# Patient Record
Sex: FEMALE | Race: WHITE | NOT HISPANIC OR LATINO | Employment: PART TIME | URBAN - METROPOLITAN AREA
[De-identification: names, ages, dates, MRNs, and addresses within clinical notes are randomized per-mention and may not be internally consistent; named-entity substitution may affect disease eponyms.]

---

## 2017-08-07 ENCOUNTER — APPOINTMENT (OUTPATIENT)
Dept: LAB | Age: 62
End: 2017-08-07

## 2017-08-07 ENCOUNTER — TRANSCRIBE ORDERS (OUTPATIENT)
Dept: ADMINISTRATIVE | Age: 62
End: 2017-08-07

## 2017-08-07 DIAGNOSIS — Z00.8 HEALTH EXAMINATION IN POPULATION SURVEY: Primary | ICD-10-CM

## 2017-08-07 DIAGNOSIS — Z00.8 HEALTH EXAMINATION IN POPULATION SURVEY: ICD-10-CM

## 2017-08-07 LAB
CHOLEST SERPL-MCNC: 191 MG/DL (ref 50–200)
EST. AVERAGE GLUCOSE BLD GHB EST-MCNC: 126 MG/DL
HBA1C MFR BLD: 6 % (ref 4.2–6.3)
HDLC SERPL-MCNC: 46 MG/DL (ref 40–60)
LDLC SERPL CALC-MCNC: 126 MG/DL (ref 0–100)
TRIGL SERPL-MCNC: 96 MG/DL

## 2017-08-07 PROCEDURE — 80061 LIPID PANEL: CPT

## 2017-08-07 PROCEDURE — 36415 COLL VENOUS BLD VENIPUNCTURE: CPT

## 2017-08-07 PROCEDURE — 83036 HEMOGLOBIN GLYCOSYLATED A1C: CPT

## 2017-10-01 ENCOUNTER — HOSPITAL ENCOUNTER (EMERGENCY)
Facility: HOSPITAL | Age: 62
Discharge: HOME | End: 2017-10-01
Attending: EMERGENCY MEDICINE | Admitting: EMERGENCY MEDICINE
Payer: COMMERCIAL

## 2017-10-01 VITALS
RESPIRATION RATE: 18 BRPM | TEMPERATURE: 98.3 F | SYSTOLIC BLOOD PRESSURE: 152 MMHG | OXYGEN SATURATION: 97 % | WEIGHT: 160 LBS | BODY MASS INDEX: 31.25 KG/M2 | DIASTOLIC BLOOD PRESSURE: 87 MMHG | HEART RATE: 76 BPM

## 2017-10-01 DIAGNOSIS — S61.219A FINGER LACERATION, INITIAL ENCOUNTER: Primary | ICD-10-CM

## 2017-10-01 PROCEDURE — 90471 IMMUNIZATION ADMIN: CPT

## 2017-10-01 PROCEDURE — 90715 TDAP VACCINE 7 YRS/> IM: CPT | Performed by: PHYSICIAN ASSISTANT

## 2017-10-01 PROCEDURE — 99283 EMERGENCY DEPT VISIT LOW MDM: CPT

## 2017-10-01 RX ORDER — LIDOCAINE HYDROCHLORIDE 10 MG/ML
3 INJECTION, SOLUTION EPIDURAL; INFILTRATION; INTRACAUDAL; PERINEURAL ONCE
Status: COMPLETED | OUTPATIENT
Start: 2017-10-01 | End: 2017-10-01

## 2017-10-01 RX ADMIN — TETANUS TOXOID, REDUCED DIPHTHERIA TOXOID AND ACELLULAR PERTUSSIS VACCINE, ADSORBED 0.5 ML: 5; 2.5; 8; 8; 2.5 SUSPENSION INTRAMUSCULAR at 14:11

## 2017-10-01 RX ADMIN — LIDOCAINE HYDROCHLORIDE 3 ML: 10 INJECTION, SOLUTION EPIDURAL; INFILTRATION; INTRACAUDAL; PERINEURAL at 14:12

## 2017-10-01 NOTE — DISCHARGE INSTRUCTIONS
Finger Laceration   WHAT YOU NEED TO KNOW:   A finger laceration is a deep cut in your skin  It is often caused by a sharp object, such as a knife, or blunt force to your finger  Your blood vessels, bones, joints, tendons, or nerves may also be injured  DISCHARGE INSTRUCTIONS:   Return to the emergency department if:   · Your wound comes apart  · Blood soaks through your bandage  · You have severe pain in your finger or hand  · Your finger is pale and cold  · You have sudden trouble moving your finger  · Your swelling suddenly gets worse  · You have red streaks on your skin coming from your wound  Contact your healthcare provider or hand specialist if:   · You have new numbness or tingling  · Your finger feels warm, looks swollen or red, and is draining pus  · You have a fever  · You have questions or concerns about your condition or care  Medicines: You may  need any of the following:  · Antibiotics  help prevent a bacterial infection  · Acetaminophen  decreases pain and fever  It is available without a doctor's order  Ask how much to take and how often to take it  Follow directions  Read the labels of all other medicines you are using to see if they also contain acetaminophen, or ask your doctor or pharmacist  Acetaminophen can cause liver damage if not taken correctly  Do not use more than 4 grams (4,000 milligrams) total of acetaminophen in one day  · Prescription pain medicine  may be given  Ask your healthcare provider how to take this medicine safely  Some prescription pain medicines contain acetaminophen  Do not take other medicines that contain acetaminophen without talking to your healthcare provider  Too much acetaminophen may cause liver damage  Prescription pain medicine may cause constipation  Ask your healthcare provider how to prevent or treat constipation  · Take your medicine as directed    Contact your healthcare provider if you think your medicine is not helping or if you have side effects  Tell him or her if you are allergic to any medicine  Keep a list of the medicines, vitamins, and herbs you take  Include the amounts, and when and why you take them  Bring the list or the pill bottles to follow-up visits  Carry your medicine list with you in case of an emergency  Self-care:   · Apply ice  on your finger for 15 to 20 minutes every hour or as directed  Use an ice pack, or put crushed ice in a plastic bag  Cover it with a towel before you apply it to your skin  Ice helps prevent tissue damage and decreases swelling and pain  · Elevate  your hand above the level of your heart as often as you can  This will help decrease swelling and pain  Prop your hand on pillows or blankets to keep it elevated comfortably  · Wear your splint as directed  A splint will decrease movement and stress on your wound  The splint may help your wound heal faster  Ask your healthcare provider how to apply and remove a splint  · Apply ointments to decrease scarring  Do not apply ointments until your healthcare provider says it is okay  You may need to wait until your wound is healed  Ask which ointment to buy and how often to use it  Wound care:   · Do not get your wound wet until your healthcare provider says it is okay  Do not soak your hand in water  Do not go swimming until your healthcare provider says it is okay  When your healthcare provider says it is okay, carefully wash around the wound with soap and water  Let soap and water run over your wound  Gently pat the area dry or allow it to air dry  · Change your bandages when they get wet, dirty, or after washing  Apply new, clean bandages as directed  Do not apply elastic bandages or tape too tightly  Do not put powders or lotions on your wound  · Apply antibiotic ointment as directed  Your healthcare provider may give you antibiotic ointment to put over your wound if you have stitches   If you have Strips-Strips over your wound, let them dry up and fall off on their own  If they do not fall off within 14 days, gently remove them  If you have glue over your wound, do not remove or pick at it  If your glue comes off, do not replace it with glue that you have at home  · Check your wound every day for signs of infection  Signs of infection include swelling, redness, or pus  Follow up with your healthcare provider or hand specialist in 2 days:  Write down your questions so you remember to ask them during your visits  © 2017 [facility] Information is for End User's use only and may not be sold, redistributed or otherwise used for commercial purposes  All illustrations and images included in CareNotes® are the copyrighted property of [employer]  or [address]  The above information is an  only  It is not intended as medical advice for individual conditions or treatments  Talk to your doctor, nurse or pharmacist before following any medical regimen to see if it is safe and effective for you

## 2017-10-01 NOTE — ED PROVIDER NOTES
History  Chief Complaint   Patient presents with    Finger Injury     Patient reports cutting a "large head of cabbage and came down on L 2nd finger slicing it open"  61 y/o right hand dominant female in [place] came to ed with pressure applied to left ring finger tip c/o "i cut myself accidentally with a kitchen knife"  Pt deny other injuries  Cms intact  Nail spared  Td status unknown  Prior to Admission Medications   Prescriptions Last Dose Informant Patient Reported? Taking? PARoxetine (PAXIL) 10 mg tablet   Yes Yes   Sig: Take 10 mg by mouth every morning  losartan (COZAAR) 50 mg tablet   Yes Yes   Sig: Take 50 mg by mouth daily  metoprolol succinate (TOPROL-XL) 50 mg 24 hr tablet   Yes Yes   Sig: Take 50 mg by mouth daily  Pt hasnt taken it for awhile   pantoprazole (PROTONIX) 40 mg tablet   Yes Yes   Sig: Take 40 mg by mouth 2 (two) times a day  pravastatin (PRAVACHOL) 10 mg tablet   Yes Yes   Sig: Take 10 mg by mouth daily  Facility-Administered Medications: None       Past Medical History:   Diagnosis Date    Anxiety     GERD (gastroesophageal reflux disease)     Hypertension        Past Surgical History:   Procedure Laterality Date     SECTION      ESOPHAGOGASTRODUODENOSCOPY N/A 2016    Procedure: ESOPHAGOGASTRODUODENOSCOPY (EGD); Surgeon: Luciana Elmore MD;  Location: [facility]; Service:     KNEE ARTHROSCOPY         History reviewed  No pertinent family history  I have reviewed and agree with the history as documented  Social History   Substance Use Topics    Smoking status: Former Smoker    Smokeless tobacco: Never Used    Alcohol use No        Review of Systems   Constitutional: Negative  HENT: Negative  Musculoskeletal: Negative  Skin: Positive for wound         Physical Exam  ED Triage Vitals [10/01/17 1307]   Temperature Pulse Respirations Blood Pressure SpO2   98 3 °F (36 8 °C) 76 18 152/87 97 %      Temp Source Heart Rate Source Patient Position - Orthostatic VS BP Location FiO2 (%)   Oral Monitor Lying Right arm --      Pain Score       5           Physical Exam   Constitutional: No distress  Musculoskeletal:        Hands:  No tendon lac  Simple 2 cm flap with 1 side still intact at paronychium side  Skin: Skin is warm  No rash noted  She is not diaphoretic  No erythema  No pallor  ED Medications  Medications   lidocaine (PF) (XYLOCAINE-MPF) 1 % injection 3 mL (3 mL Infiltration Given 10/1/17 1412)   tetanus-diphtheria-acellular pertussis (BOOSTRIX) IM injection 0 5 mL (0 5 mL Intramuscular Given 10/1/17 1411)       Diagnostic Studies  Labs Reviewed - No data to display    No orders to display       Procedures  Lac Repair  Date/Time: 10/1/2017 2:11 PM  Performed by: Ian Persons  Authorized by: Rock Barrios     Patient location:  ED  Other Assisting Provider: No    Consent:     Consent obtained:  Verbal    Consent given by:  Patient    Risks discussed:  Infection    Alternatives discussed:  No treatment  Universal protocol:     Procedure explained and questions answered to patient or proxy's satisfaction: yes      Patient identity confirmed:  Arm band  Anesthesia (see MAR for exact dosages):      Anesthesia method:  Local infiltration    Local anesthetic:  Lidocaine 1% w/o epi  Laceration details:     Location:  Finger    Finger location:  L ring finger    Length (cm) of Repair:  2  Repair type:     Repair type:  Simple  Pre-procedure details:     Preparation:  Patient was prepped and draped in usual sterile fashion  Exploration:     Wound exploration: entire depth of wound probed and visualized      Contaminated: no    Treatment:     Area cleansed with:  Betadine    Amount of cleaning:  Standard    Irrigation solution:  Sterile water  Skin repair:     Repair method:  Sutures    Suture size:  5-0    Suture material:  Prolene    Number of sutures:  5    Describe any area left open:  Flap in distal phalanx lateral aspect, nail spared  Approximation:     Approximation:  Close    Approximation difficulty:  Simple    Vermilion border: well-aligned    Post-procedure details:     Dressing:  Antibiotic ointment    Patient tolerance of procedure: Tolerated well, no immediate complications          Phone Contacts  ED Phone Contact    ED Course  ED Course                                MDM  Number of Diagnoses or Management Options  Finger laceration, initial encounter:   Diagnosis management comments: Pt tolerated procedure well  Td updated  CritCare Time    Disposition  Final diagnoses:   Finger laceration, initial encounter     ED Disposition     ED Disposition Condition Comment    Discharge  Rama Stover discharge to home/self care  Condition at discharge: Stable        Follow-up Information     Follow up With Specialties Details Why Contact Tiffanie Wilkins, DO Internal Medicine, Family Medicine  for suture removal [address] [address]  [phone]          Patient's Medications   Discharge Prescriptions    No medications on file     No discharge procedures on file      ED Provider  Electronically Signed by       Nadia Mendiola PA-C  10/01/17 0956

## 2017-10-12 ENCOUNTER — GENERIC CONVERSION - ENCOUNTER (OUTPATIENT)
Dept: OTHER | Facility: OTHER | Age: 62
End: 2017-10-12

## 2017-10-12 DIAGNOSIS — Z12.31 ENCOUNTER FOR SCREENING MAMMOGRAM FOR MALIGNANT NEOPLASM OF BREAST: ICD-10-CM

## 2017-10-12 DIAGNOSIS — R53.83 OTHER FATIGUE: ICD-10-CM

## 2017-10-12 DIAGNOSIS — E66.3 OVERWEIGHT: ICD-10-CM

## 2017-10-12 DIAGNOSIS — R73.9 HYPERGLYCEMIA: ICD-10-CM

## 2017-10-13 ENCOUNTER — HOSPITAL ENCOUNTER (OUTPATIENT)
Dept: RADIOLOGY | Age: 62
Discharge: HOME | End: 2017-10-13
Payer: COMMERCIAL

## 2017-10-13 DIAGNOSIS — Z12.31 ENCOUNTER FOR SCREENING MAMMOGRAM FOR MALIGNANT NEOPLASM OF BREAST: ICD-10-CM

## 2017-10-13 PROCEDURE — G0202 SCR MAMMO BI INCL CAD: HCPCS

## 2017-11-14 ENCOUNTER — ALLSCRIPTS OFFICE VISIT (OUTPATIENT)
Dept: OTHER | Facility: OTHER | Age: 62
End: 2017-11-14

## 2017-11-14 ENCOUNTER — APPOINTMENT (OUTPATIENT)
Dept: RADIOLOGY | Facility: OTHER | Age: 62
End: 2017-11-14
Payer: COMMERCIAL

## 2017-11-14 DIAGNOSIS — M25.561 PAIN IN RIGHT KNEE: ICD-10-CM

## 2017-11-14 DIAGNOSIS — M25.562 PAIN IN LEFT KNEE: ICD-10-CM

## 2017-11-14 PROCEDURE — 73562 X-RAY EXAM OF KNEE 3: CPT

## 2017-11-14 PROCEDURE — 73564 X-RAY EXAM KNEE 4 OR MORE: CPT

## 2017-11-15 NOTE — PROGRESS NOTES
Assessment    1  Primary localized osteoarthritis of right knee (715 16) (M17 11)    Plan  Bilateral knee pain    · * XR KNEE 3 VW LEFT NON INJURY; Status:Active; Requested for:14Nov2017;    · * XR KNEE 4+ VW RIGHT INJURY; Status:Active; Requested for:14Nov2017;   Primary localized osteoarthritis of right knee    · Follow-up visit in 1 month Evaluation and Treatment  Follow-up  Status: Hold For -Scheduling  Requested for: 72MKO9471   · What is a corticosteroid?; Status:Complete;   Done: 24GKK6763 01:43PM    Discussion/Summary    The patient has medial compartment osteoarthritis of the right knee and currently is experiencing symptoms  For symptomatic control she was offered and provided intra-articular injection today as detailed above  We will re-evaluate in 4 weeks, if she fails to improve a trial of viscosupplementation would be indicated  She understands that further arthroscopic surgery of the knee is unlikely to provide her with significant benefit and instead surgical options include arthroplasty  History of Present Illness  HPI: the patient presents with a chief complaint of right knee pain, she states the pain is sharp it is localized to medial and lateral aspect of the knee as well as the patellofemoral joint, is worse with activity relieved by rest, it is intermittent timing, it is not associated numbness or tingling or fevers and chills  The patient had a partial medial meniscectomy myself 5 or 6 years ago with excellent result until just recently      Review of Systems   Constitutional: No fever, no chills, feels well, no tiredness, no recent weight gain or loss  Eyes: No complaints of eyesight problems, no red eyes  ENT: no loss of hearing, no nosebleeds, no sore throat  Cardiovascular: No complaints of chest pain, no palpitations, no leg claudication or lower extremity edema  Respiratory: no compliants of shortness of breath, no wheezing, no cough    Gastrointestinal: no complaints of abdominal pain, no constipation, no nausea or diarrhea, no vomiting, no bloody stools  Genitourinary: no complaints of dysuria, no incontinence  Musculoskeletal: as noted in HPI  Integumentary: no complaints of skin rash or lesion, no itching or dry skin, no skin wounds  Neurological: no complaints of headache, no confusion, no numbness or tingling, no dizziness  Endocrine: No complaints of muscle weakness, no feelings of weakness, no frequent urination, no excessive thirst   Psychiatric: No suicidal thoughts, no anxiety, no feelings of depression  ROS reviewed  Active Problems    1  Bilateral knee pain (719 46) (M25 561,M25 562)   2  Depression (311) (F32 9)   3  Dyslipidemia (272 4) (E78 5)   4  Encounter for screening mammogram for malignant neoplasm of breast (V76 12) (Z12 31)   5  Fatigue (780 79) (R53 83)   6  Hyperglycemia (790 29) (R73 9)   7  Hypertension (401 9) (I10)   8   Overweight (278 02) (E66 3)    Past Medical History     · History of Acute frontal sinusitis (461 1) (J01 10)   · History of Acute upper respiratory infection (465 9) (J06 9)   · History of Anxiety (300 00) (F41 9)   · History of Arthralgia of right knee (719 46) (M25 561)   · History of Back spasm (724 8) (M62 830)   · History of  Delivery (669 70)   · History of Chronic interstitial cystitis (595 1) (N30 10)   · History of Encounter for routine gynecological examination (V72 31) (Z01 419)   · History of Encounter for screening mammogram for malignant neoplasm of breast(V76 12) (Z12 31)   · History of Esophagitis, reflux (530 11) (K21 0)   · History of acute bronchitis (V12 69) (Z87 09)   · History of acute sinusitis (V12 69) (Z87 09)   · History of backache (V13 59) (Z87 39)   · Denied: History of depression   · History of hypokalemia (V12 29) (Z86 39)   · History of influenza (V12 09) (Z87 09)   · History of osteopenia (V13 59) (Z87 39)   · History of screening mammography (V15 89) (Z92 89)   · Denied: History of substance abuse   · History of uterine leiomyoma (V13 29) (Z86 018)   · History of viral gastroenteritis (V12 09) (Z86 19)   · History of Irritable bowel syndrome (564 1) (K58 9)   · History of Joint Pain In Both Knees (719 46)   · History of Leg swelling (729 81) (M79 89)   · Normal delivery (650) (O80,Z37  9)   · History of Osteoarthritis (715 90) (M19 90)   · History of Postmenopausal Atrophic Vaginitis (627 3)   · History of Puncture wound of right hand, initial encounter (882 0) (A12 132U)   · History of Stress Incontinence (788 39)   · History of Thoracic back pain (724 1) (M54 6)   · History of Urinary Tract Infection (V13 02)    The active problems and past medical history were reviewed and updated today  Surgical History   · History of Breast Surgery Puncture Aspiration Of Cyst Left Breast   · History of Cardiac Cath Procedure Summary   · History of Complete Colonoscopy   · History of Diagnostic Esophagogastroduodenoscopy   · History of Knee Arthroscopy (Therapeutic)   · History of Vaginal Hysterectomy    The surgical history was reviewed and updated today  Family History  Mother    · Family history of Cancer   · Denied: Family history of depression   · Denied: Family history of substance abuse   · Family history of Mother  At Age 58  Father    · Denied: Family history of depression   · Denied: Family history of substance abuse   · Family history of Father  At Age 37   · Family history of Heart Disease (V17 49)  Son    · Family history of Acute Myocardial Infarction (V17 3)  Spouse    · Family history of lung cancer (V16 1) (Z80 1)  Family History    · Family history of Family Health Status ___ Children Living   · Family history of Hypertension (V17 49)   · Family history of Peptic Ulcer    The family history was reviewed and updated today         Social History     · Being A Social Drinker   · rare   · Daily caffeine consumption, 1 serving a day   · Exercise habits   · Former smoker (V15 82) (Z87 891)   · quite 3-4 yrs ago, 1/2 ppd x 20-25 yrs   ·   The social history was reviewed and updated today  Current Meds   1  ALPRAZolam 0 25 MG Oral Tablet (Xanax); TAKE 1 TABLET EVERY 8 TO 12 HOURS AS NEEDED; Therapy: 41OVG6025 to (Evaluate:11Nov2017); Last Rx:12Oct2017 Ordered   2  Losartan Potassium 50 MG Oral Tablet; TAKE 1 TABLET DAILY AS DIRECTED; Therapy: 78AVT9990 to (Tonny Mendiola)  Requested for: 62LMG9436; Last Rx:22Lbo9010 Ordered   3  Metoprolol Succinate ER 50 MG Oral Tablet Extended Release 24 Hour (Toprol XL); 1 tab PO daily; Therapy: 39TDV7443 to (Last Rx:12Oct2017)  Requested for: 12Oct2017 Ordered   4  PARoxetine HCl - 10 MG Oral Tablet (Paxil); one po bid; Therapy: 47UAR5258 to (Leonora Fitzgerald)  Requested for: 80YWG6863; Last Rx:12Oct2017 Ordered   5  Pravastatin Sodium 40 MG Oral Tablet (Pravachol); TAKE 1 TABLET DAILY; Therapy: 03AMR3206 to (Evaluate:93Gzc6395)  Requested for: 99EXY4816; Last Rx:12Oct2017 Ordered   6  Protonix 40 MG Oral Packet; TAKE 40 MG BID; Therapy: 83Ppo2395 to (Evaluate:35Dfw3813)  Requested for: 94JCO7497; Last Rx:12Oct2017 Ordered    The medication list was reviewed and updated today  Allergies  1  PredniSONE TABS   2  NSAIDs   3  Sulfa Drugs   4  Bactrim TABS    Vitals  Signs     Heart Rate: 73  Systolic: 526, Sitting  Diastolic: 83, Sitting  Weight: 160 lb   BMI Calculated: 31 25  BSA Calculated: 1 7    Physical Exam    Right Knee: Appearance: no deformity,-- no joint dislocation,-- no ecchymosis,-- no effusion-- and-- no erythema  Tenderness: diffuse anterolateral aspect,-- diffuse anteromedial aspect,-- lateral joint line-- and-- medial joint line, but-- not diffusely over the anterior knee  Palpatory findings include crepitus-- and-- no patella ballottement  ROM: Full   Motor: Normal  Special Test: Negative except positive Bounce Home test,-- positive medial Estrellita test-- and-- positive lateral Estrellita test, but-- negative Lachman's test,-- negative Posterior Drawer sign,-- no laxity on Valgus Stress-- and-- no laxity on Varus Stress  Constitutional - General appearance: Normal   Musculoskeletal - Gait and station: Normal   Cardiovascular - Pulses: Normal   Skin - Skin and subcutaneous tissue: Normal   Neurologic - Sensation: Normal   Psychiatric - Orientation to person, place, and time: Normal       Results/Data  I personally reviewed the films/images/results in the office today  My interpretation follows  X-ray Review weight-bearing views of both knees do show significant medial compartment narrowing on the right, this is significantly more advanced than it was in the past       Procedure  The patient was indicated for an intra-articular corticosteroid injection of the right knee as detailed in the office note  This was performed at the same time as the office visit for the patient's convenience  A thorough discussion regarding the risks and benefits of the injection as well as alternatives to the injection was performed with the patient  Specific risks discussed include but are not limited to infection, flare reaction, continued symptoms, need for further intervention, temporary elevation of blood glucose, as well as skin color changes at the site of injection  After this discussion the patient provided verbal consent to proceed forward with the injection  The right knee injection site was anesthetized with ethyl chloride spray, prepped with alcohol in routine fashion and using a superiolateral approach, a 22-gauge needle was used to inject a combination of 1 mL of 6mg/mL betamethasone and 2 ml of 0 25% bupivicane into the knee joint  The patient tolerated the procedure well without complication and a Band-Aid was placed   Avoidance of strenuous activity for 24-48 hours was recommended and a detailed handout regarding the medication utilized and the possible side effects as well as activity restriction was provided to the patient        Future Appointments    Date/Time Provider Specialty Site   04/16/2018 08:30 AM Lary Gutierrez DO Internal Medicine [facility]       Signatures   Electronically signed by : CESAR Min ; Nov 14 2017  1:45PM EST                       (Author)

## 2018-01-10 NOTE — MISCELLANEOUS
Message  Return to work or school:   Katya Balderas is under my professional care  She was seen in my office on 03/18/2016   She is able to return to work on  03/21/2016      Please excuse from work 3/18/2016  KI [place]        Signatures   Electronically signed by : Elizabeth Durbin DO; Mar 19 2016  8:23AM EST                       (Author)

## 2018-01-10 NOTE — MISCELLANEOUS
Message  Return to work or school:   Vaishnavi Astorga is under my professional care  She was seen in my office on 03/24/2016       Please excuse [place] from work today  KI Nicole        Signatures   Electronically signed by : Debbie Medrano DO; Mar 24 2016 11:38AM EST                       (Author)

## 2018-01-14 VITALS
HEART RATE: 73 BPM | DIASTOLIC BLOOD PRESSURE: 83 MMHG | SYSTOLIC BLOOD PRESSURE: 147 MMHG | BODY MASS INDEX: 31.25 KG/M2 | WEIGHT: 160 LBS

## 2018-01-15 NOTE — MISCELLANEOUS
Message  Return to work or school:   Katherin Yusuf is under my professional care  She was seen in my office on 01/13/2016   She is able to return to work on  01/15/2016      Ivette Zavala          Signatures   Electronically signed by : Sheryl Monroe DO; Jan 14 2016  8:04AM EST                       (Author)

## 2018-01-16 ENCOUNTER — GENERIC CONVERSION - ENCOUNTER (OUTPATIENT)
Dept: OTHER | Facility: OTHER | Age: 63
End: 2018-01-16

## 2018-01-22 VITALS
HEART RATE: 88 BPM | SYSTOLIC BLOOD PRESSURE: 132 MMHG | WEIGHT: 160.25 LBS | DIASTOLIC BLOOD PRESSURE: 76 MMHG | OXYGEN SATURATION: 97 % | HEIGHT: 60 IN | BODY MASS INDEX: 31.46 KG/M2 | TEMPERATURE: 97.1 F

## 2018-01-24 VITALS
HEART RATE: 84 BPM | BODY MASS INDEX: 30.74 KG/M2 | WEIGHT: 157.38 LBS | SYSTOLIC BLOOD PRESSURE: 133 MMHG | DIASTOLIC BLOOD PRESSURE: 83 MMHG

## 2018-02-02 ENCOUNTER — OFFICE VISIT (OUTPATIENT)
Dept: OBGYN CLINIC | Facility: MEDICAL CENTER | Age: 63
End: 2018-02-02
Payer: COMMERCIAL

## 2018-02-02 VITALS
BODY MASS INDEX: 30.43 KG/M2 | DIASTOLIC BLOOD PRESSURE: 57 MMHG | WEIGHT: 155 LBS | HEIGHT: 60 IN | HEART RATE: 58 BPM | SYSTOLIC BLOOD PRESSURE: 129 MMHG

## 2018-02-02 DIAGNOSIS — M17.11 PRIMARY OSTEOARTHRITIS OF RIGHT KNEE: Primary | ICD-10-CM

## 2018-02-02 DIAGNOSIS — M17.11 PRIMARY LOCALIZED OSTEOARTHRITIS OF RIGHT KNEE: Primary | ICD-10-CM

## 2018-02-02 PROCEDURE — 99213 OFFICE O/P EST LOW 20 MIN: CPT | Performed by: ORTHOPAEDIC SURGERY

## 2018-02-02 RX ORDER — ALPRAZOLAM 0.25 MG/1
1 TABLET ORAL EVERY 12 HOURS PRN
COMMUNITY
Start: 2015-03-23 | End: 2018-03-09

## 2018-02-02 RX ORDER — DOCUSATE SODIUM 100 MG/1
100 CAPSULE, LIQUID FILLED ORAL 2 TIMES DAILY
Status: CANCELLED | OUTPATIENT
Start: 2018-02-02

## 2018-02-02 RX ORDER — ACETAMINOPHEN 325 MG/1
975 TABLET ORAL ONCE
Status: CANCELLED | OUTPATIENT
Start: 2018-02-02 | End: 2018-02-02

## 2018-02-02 RX ORDER — CHLORHEXIDINE GLUCONATE 4 G/100ML
SOLUTION TOPICAL DAILY PRN
Status: CANCELLED | OUTPATIENT
Start: 2018-02-02

## 2018-02-02 RX ORDER — SODIUM CHLORIDE, SODIUM LACTATE, POTASSIUM CHLORIDE, CALCIUM CHLORIDE 600; 310; 30; 20 MG/100ML; MG/100ML; MG/100ML; MG/100ML
125 INJECTION, SOLUTION INTRAVENOUS CONTINUOUS
Status: CANCELLED | OUTPATIENT
Start: 2018-02-02

## 2018-02-02 RX ORDER — ONDANSETRON 2 MG/ML
4 INJECTION INTRAMUSCULAR; INTRAVENOUS ONCE
Status: CANCELLED | OUTPATIENT
Start: 2018-02-02 | End: 2018-02-02

## 2018-02-02 NOTE — PROGRESS NOTES
60 y o female with right knee arthritis presenting for evaluation  She has previously been seeing Dr Randi Anne for right knee pain and has past surgical history of an arthroscopic partial meniscectomy in   Since she has had progressive insidious atraumatic onset of right knee pain  She has had a previous steroid injection this past November which gave her minimal symptom relief  Pain is made worse with the knee in a bent position and is most bothersome when she is driving or sitting for prolonged period times  It is also bothersome when she walks for prolonged periods  She says her knee is of the septum 1 is more painful in the evening  She has also tried bracing and home exercises which the provide relief  Denies any new injury, numbness tingling, fevers or chills    Review of Systems  Review of systems negative unless otherwise specified in HPI    Past Medical History  Past Medical History:   Diagnosis Date    Anxiety     GERD (gastroesophageal reflux disease)     Hypertension     Stomach disorder        Past Surgical History  Past Surgical History:   Procedure Laterality Date    CARDIAC SURGERY       SECTION      ESOPHAGOGASTRODUODENOSCOPY N/A 2016    Procedure: ESOPHAGOGASTRODUODENOSCOPY (EGD); Surgeon: Roberto Kendall MD;  Location: [facility]; Service:     KNEE ARTHROSCOPY         Current Medications  Current Outpatient Prescriptions on File Prior to Visit   Medication Sig Dispense Refill    losartan (COZAAR) 50 mg tablet Take 50 mg by mouth daily   metoprolol succinate (TOPROL-XL) 50 mg 24 hr tablet Take 50 mg by mouth daily  Pt hasnt taken it for awhile      pantoprazole (PROTONIX) 40 mg tablet Take 40 mg by mouth 2 (two) times a day   PARoxetine (PAXIL) 10 mg tablet Take 10 mg by mouth every morning   pravastatin (PRAVACHOL) 10 mg tablet Take 10 mg by mouth daily  No current facility-administered medications on file prior to visit          Recent Labs (HCT,HGB,PT,INR,ESR,CRP,GLU,HgA1C)  @LABALLVALUEIP(HCT,HGB,WBC,PT,INR,ESR,CRP,GLUCOSE,HGBA1C)@      Physical exam  · General: Awake, Alert, Oriented  · Eyes: Pupils equal, round and reactive to light  · Heart: regular rate and rhythm  · Lungs: No audible wheezing, CTAB  · Abdomen: soft  Right lower extremity  · Varus alignment  · Skin intact  · Extension 5°, flexion full  · Tender palpation over medial joint line  · No knee effusion  · Knee stable in sagittal and coronal planes  · 5 out of 5 quad and hamstring strength  · Sensation intact L1-S1  Imaging  X-rays of bilateral knees shows joint space narrowing, subchondral sclerosis and osteophytic changes in the right knee more so in the medial compartment      Assessment/Plan:   60 y  o female with right knee arthritis has been refractory to conservative treatments including injections, bracing, and exercises    · Treatment options were discussed with the patient including both continued conservative care and surgical management  A joint decision was made to proceed with a right total knee arthroplasty  Informed consent was obtained she is booked for surgery accordingly  Risks and benefits were explained including bleeding, infection, persistent knee pain, blood clot, pulmonary embolism    · Preoperative prescriptions were given in the office today  · We will see her back in the office postoperatively

## 2018-02-05 ENCOUNTER — TELEPHONE (OUTPATIENT)
Dept: OBGYN CLINIC | Facility: HOSPITAL | Age: 63
End: 2018-02-05

## 2018-02-05 DIAGNOSIS — M25.561 ACUTE PAIN OF RIGHT KNEE: Primary | ICD-10-CM

## 2018-02-05 RX ORDER — FERROUS SULFATE TAB EC 324 MG (65 MG FE EQUIVALENT) 324 (65 FE) MG
324 TABLET DELAYED RESPONSE ORAL
Qty: 60 TABLET | Refills: 0 | Status: SHIPPED | OUTPATIENT
Start: 2018-02-05 | End: 2018-03-09

## 2018-02-05 RX ORDER — ASCORBIC ACID 500 MG
500 TABLET ORAL 2 TIMES DAILY
Qty: 60 TABLET | Refills: 0 | Status: SHIPPED | OUTPATIENT
Start: 2018-02-05 | End: 2018-03-09

## 2018-02-05 RX ORDER — FOLIC ACID 1 MG/1
1 TABLET ORAL DAILY
Qty: 30 TABLET | Refills: 0 | Status: SHIPPED | OUTPATIENT
Start: 2018-02-05 | End: 2018-03-09

## 2018-02-05 NOTE — TELEPHONE ENCOUNTER
Patient is calling   [phone]  Patient sees Dr Lyly Hoffman    Patient would like the scripts that Dr Lyly Hoffman gave her sent to [facility] at [address], [address] [place] [address]  The scripts that she would like sent there are:  *Folic Acid  *Vitamin C  *Ferrous Sulfate    Please call the patient when the scripts are sent over

## 2018-02-12 ENCOUNTER — APPOINTMENT (OUTPATIENT)
Dept: RADIOLOGY | Age: 63
End: 2018-02-12
Payer: COMMERCIAL

## 2018-02-12 ENCOUNTER — TRANSCRIBE ORDERS (OUTPATIENT)
Dept: ADMINISTRATIVE | Age: 63
End: 2018-02-12

## 2018-02-12 ENCOUNTER — APPOINTMENT (OUTPATIENT)
Dept: LAB | Age: 63
End: 2018-02-12
Payer: COMMERCIAL

## 2018-02-12 ENCOUNTER — OFFICE VISIT (OUTPATIENT)
Dept: LAB | Age: 63
End: 2018-02-12
Payer: COMMERCIAL

## 2018-02-12 DIAGNOSIS — M17.11 PRIMARY LOCALIZED OSTEOARTHRITIS OF RIGHT KNEE: ICD-10-CM

## 2018-02-12 DIAGNOSIS — M17.11 PRIMARY LOCALIZED OSTEOARTHRITIS OF RIGHT KNEE: Primary | ICD-10-CM

## 2018-02-12 LAB
ABO GROUP BLD: NORMAL
ANION GAP SERPL CALCULATED.3IONS-SCNC: 5 MMOL/L (ref 4–13)
APTT PPP: 31 SECONDS (ref 23–35)
ATRIAL RATE: 52 BPM
BASOPHILS # BLD AUTO: 0.04 THOUSANDS/ΜL (ref 0–0.1)
BASOPHILS NFR BLD AUTO: 1 % (ref 0–1)
BLD GP AB SCN SERPL QL: NEGATIVE
BUN SERPL-MCNC: 12 MG/DL (ref 5–25)
CALCIUM SERPL-MCNC: 9 MG/DL (ref 8.3–10.1)
CHLORIDE SERPL-SCNC: 105 MMOL/L (ref 100–108)
CO2 SERPL-SCNC: 31 MMOL/L (ref 21–32)
CREAT SERPL-MCNC: 0.74 MG/DL (ref 0.6–1.3)
EOSINOPHIL # BLD AUTO: 0.18 THOUSAND/ΜL (ref 0–0.61)
EOSINOPHIL NFR BLD AUTO: 2 % (ref 0–6)
ERYTHROCYTE [DISTWIDTH] IN BLOOD BY AUTOMATED COUNT: 13.9 % (ref 11.6–15.1)
GFR SERPL CREATININE-BSD FRML MDRD: 86 ML/MIN/1.73SQ M
GLUCOSE P FAST SERPL-MCNC: 94 MG/DL (ref 65–99)
HCT VFR BLD AUTO: 44.7 % (ref 34.8–46.1)
HGB BLD-MCNC: 14.3 G/DL (ref 11.5–15.4)
INR PPP: 0.94 (ref 0.86–1.16)
LYMPHOCYTES # BLD AUTO: 2.4 THOUSANDS/ΜL (ref 0.6–4.47)
LYMPHOCYTES NFR BLD AUTO: 28 % (ref 14–44)
MCH RBC QN AUTO: 28.3 PG (ref 26.8–34.3)
MCHC RBC AUTO-ENTMCNC: 32 G/DL (ref 31.4–37.4)
MCV RBC AUTO: 89 FL (ref 82–98)
MONOCYTES # BLD AUTO: 0.55 THOUSAND/ΜL (ref 0.17–1.22)
MONOCYTES NFR BLD AUTO: 7 % (ref 4–12)
NEUTROPHILS # BLD AUTO: 5.31 THOUSANDS/ΜL (ref 1.85–7.62)
NEUTS SEG NFR BLD AUTO: 62 % (ref 43–75)
NRBC BLD AUTO-RTO: 0 /100 WBCS
P AXIS: 72 DEGREES
PLATELET # BLD AUTO: 271 THOUSANDS/UL (ref 149–390)
PMV BLD AUTO: 10.7 FL (ref 8.9–12.7)
POTASSIUM SERPL-SCNC: 4.1 MMOL/L (ref 3.5–5.3)
PR INTERVAL: 152 MS
PROTHROMBIN TIME: 12.6 SECONDS (ref 12.1–14.4)
QRS AXIS: 9 DEGREES
QRSD INTERVAL: 78 MS
QT INTERVAL: 442 MS
QTC INTERVAL: 411 MS
RBC # BLD AUTO: 5.05 MILLION/UL (ref 3.81–5.12)
RH BLD: POSITIVE
SODIUM SERPL-SCNC: 141 MMOL/L (ref 136–145)
SPECIMEN EXPIRATION DATE: NORMAL
T WAVE AXIS: 34 DEGREES
VENTRICULAR RATE: 52 BPM
WBC # BLD AUTO: 8.49 THOUSAND/UL (ref 4.31–10.16)

## 2018-02-12 PROCEDURE — 86901 BLOOD TYPING SEROLOGIC RH(D): CPT

## 2018-02-12 PROCEDURE — 85730 THROMBOPLASTIN TIME PARTIAL: CPT

## 2018-02-12 PROCEDURE — 80048 BASIC METABOLIC PNL TOTAL CA: CPT

## 2018-02-12 PROCEDURE — 86850 RBC ANTIBODY SCREEN: CPT

## 2018-02-12 PROCEDURE — 71046 X-RAY EXAM CHEST 2 VIEWS: CPT

## 2018-02-12 PROCEDURE — 85610 PROTHROMBIN TIME: CPT

## 2018-02-12 PROCEDURE — 85025 COMPLETE CBC W/AUTO DIFF WBC: CPT

## 2018-02-12 PROCEDURE — 36415 COLL VENOUS BLD VENIPUNCTURE: CPT

## 2018-02-12 PROCEDURE — 93005 ELECTROCARDIOGRAM TRACING: CPT

## 2018-02-12 PROCEDURE — 93010 ELECTROCARDIOGRAM REPORT: CPT | Performed by: INTERNAL MEDICINE

## 2018-02-12 PROCEDURE — 86900 BLOOD TYPING SEROLOGIC ABO: CPT

## 2018-02-15 ENCOUNTER — OFFICE VISIT (OUTPATIENT)
Dept: FAMILY MEDICINE CLINIC | Facility: CLINIC | Age: 63
End: 2018-02-15
Payer: COMMERCIAL

## 2018-02-15 VITALS
SYSTOLIC BLOOD PRESSURE: 122 MMHG | TEMPERATURE: 97.5 F | BODY MASS INDEX: 31.02 KG/M2 | WEIGHT: 158 LBS | HEIGHT: 60 IN | OXYGEN SATURATION: 98 % | RESPIRATION RATE: 16 BRPM | HEART RATE: 64 BPM | DIASTOLIC BLOOD PRESSURE: 76 MMHG

## 2018-02-15 DIAGNOSIS — J40 BRONCHITIS: Primary | ICD-10-CM

## 2018-02-15 DIAGNOSIS — Z01.818 PRE-OPERATIVE CLEARANCE: Primary | ICD-10-CM

## 2018-02-15 DIAGNOSIS — K21.9 CHRONIC GERD: ICD-10-CM

## 2018-02-15 DIAGNOSIS — I10 HYPERTENSION, UNSPECIFIED TYPE: ICD-10-CM

## 2018-02-15 DIAGNOSIS — M17.11 PRIMARY LOCALIZED OSTEOARTHRITIS OF RIGHT KNEE: ICD-10-CM

## 2018-02-15 DIAGNOSIS — J32.9 SINUSITIS, UNSPECIFIED CHRONICITY, UNSPECIFIED LOCATION: ICD-10-CM

## 2018-02-15 PROBLEM — M25.561 BILATERAL KNEE PAIN: Status: ACTIVE | Noted: 2017-11-14

## 2018-02-15 PROBLEM — M25.562 BILATERAL KNEE PAIN: Status: ACTIVE | Noted: 2017-11-14

## 2018-02-15 PROCEDURE — 99242 OFF/OP CONSLTJ NEW/EST SF 20: CPT | Performed by: INTERNAL MEDICINE

## 2018-02-15 PROCEDURE — 99213 OFFICE O/P EST LOW 20 MIN: CPT | Performed by: INTERNAL MEDICINE

## 2018-02-15 RX ORDER — ALBUTEROL SULFATE 90 UG/1
2 AEROSOL, METERED RESPIRATORY (INHALATION) EVERY 6 HOURS PRN
Qty: 1 INHALER | Refills: 0 | Status: SHIPPED | OUTPATIENT
Start: 2018-02-15 | End: 2018-03-09

## 2018-02-15 RX ORDER — LEVOFLOXACIN 500 MG/1
500 TABLET, FILM COATED ORAL EVERY 24 HOURS
Qty: 10 TABLET | Refills: 0 | Status: SHIPPED | OUTPATIENT
Start: 2018-02-15 | End: 2018-02-25

## 2018-02-15 NOTE — PATIENT INSTRUCTIONS
Do not take fish oil, vit e ginkoba, gingsing, aspirin or nsaids one week before or  The day of or take proventil, losartan, metoprolol and protonix with sip of water  Pt is cleared for or

## 2018-02-15 NOTE — PROGRESS NOTES
Assessment/Plan:         Diagnoses and all orders for this visit:    Bronchitis  -     levofloxacin (LEVAQUIN) 500 mg tablet; Take 1 tablet (500 mg total) by mouth every 24 hours for 10 days  -     albuterol (PROVENTIL HFA,VENTOLIN HFA) 90 mcg/act inhaler; Inhale 2 puffs every 6 (six) hours as needed for wheezing    Sinusitis, unspecified chronicity, unspecified location          Subjective:      Patient ID: Murtaza Aparicio is a 60 y o  female  Pt feels ill since Tuesday   +cough she states it is dry and productive +post nasal drip  On fa/luis armando/iron for her prep for surgery  +h/a, denies f/s +chills  Denies sore throat        The following portions of the patient's history were reviewed and updated as appropriate: She  has a past medical history of Anxiety; GERD (gastroesophageal reflux disease); Hypertension; and Stomach disorder  She  does not have any pertinent problems on file  She  has a past surgical history that includes  section; Esophagogastroduodenoscopy (N/A, 2016); Knee arthroscopy (); and Cardiac surgery  Her family history includes Cancer in her mother; Heart attack in her father  She  reports that she has quit smoking  She has never used smokeless tobacco  She reports that she does not drink alcohol or use drugs    Current Outpatient Prescriptions   Medication Sig Dispense Refill    ALPRAZolam (XANAX) 0 25 mg tablet Take 1 tablet by mouth every 12 (twelve) hours as needed      ascorbic acid (VITAMIN C) 500 MG tablet Take 1 tablet (500 mg total) by mouth 2 (two) times a day Take with Ferrous sulfate, as part of pre-op Blood management protocol 60 tablet 0    ferrous sulfate 324 (65 Fe) mg Take 1 tablet (324 mg total) by mouth 2 (two) times a day before meals Take 1 pill BID, PRE-OP with Vit C, may be constipatory 60 tablet 0    folic acid (FOLVITE) 1 mg tablet Take 1 tablet (1 mg total) by mouth daily for 30 doses Take 1 pill po Qday PRE-OP, with Vit C, and Iron 30 tablet 0    losartan (COZAAR) 50 mg tablet Take 50 mg by mouth daily   metoprolol succinate (TOPROL-XL) 50 mg 24 hr tablet Take 50 mg by mouth daily  Pt hasnt taken it for awhile      pantoprazole (PROTONIX) 40 mg tablet Take 40 mg by mouth 2 (two) times a day   PARoxetine (PAXIL) 10 mg tablet Take 10 mg by mouth every morning   pravastatin (PRAVACHOL) 10 mg tablet Take 10 mg by mouth daily   albuterol (PROVENTIL HFA,VENTOLIN HFA) 90 mcg/act inhaler Inhale 2 puffs every 6 (six) hours as needed for wheezing 1 Inhaler 0    levofloxacin (LEVAQUIN) 500 mg tablet Take 1 tablet (500 mg total) by mouth every 24 hours for 10 days 10 tablet 0     No current facility-administered medications for this visit  Current Outpatient Prescriptions on File Prior to Visit   Medication Sig    ALPRAZolam (XANAX) 0 25 mg tablet Take 1 tablet by mouth every 12 (twelve) hours as needed    ascorbic acid (VITAMIN C) 500 MG tablet Take 1 tablet (500 mg total) by mouth 2 (two) times a day Take with Ferrous sulfate, as part of pre-op Blood management protocol    ferrous sulfate 324 (65 Fe) mg Take 1 tablet (324 mg total) by mouth 2 (two) times a day before meals Take 1 pill BID, PRE-OP with Vit C, may be constipatory    folic acid (FOLVITE) 1 mg tablet Take 1 tablet (1 mg total) by mouth daily for 30 doses Take 1 pill po Qday PRE-OP, with Vit C, and Iron    losartan (COZAAR) 50 mg tablet Take 50 mg by mouth daily   metoprolol succinate (TOPROL-XL) 50 mg 24 hr tablet Take 50 mg by mouth daily  Pt hasnt taken it for awhile    pantoprazole (PROTONIX) 40 mg tablet Take 40 mg by mouth 2 (two) times a day   PARoxetine (PAXIL) 10 mg tablet Take 10 mg by mouth every morning   pravastatin (PRAVACHOL) 10 mg tablet Take 10 mg by mouth daily  No current facility-administered medications on file prior to visit        She is allergic to bactrim [sulfamethoxazole-trimethoprim]; prednisone; and sulfa antibiotics       Review of Systems   Constitutional: Negative for chills and fever  HENT: Positive for ear pain and postnasal drip  Negative for sinus pain, sinus pressure and sore throat  Respiratory: Positive for wheezing  Cardiovascular: Negative  Neurological: Positive for headaches  Objective:    Vitals:    02/15/18 0812   BP: 122/76   Pulse: 64   Resp: 16   Temp: 97 5 °F (36 4 °C)   SpO2: 98%        Physical Exam   Constitutional: She appears well-developed and well-nourished  HENT:   Head: Normocephalic and atraumatic  Right Ear: External ear normal    Left Ear: External ear normal    Mouth/Throat: Oropharynx is clear and moist    Neck: Normal range of motion  Neck supple  Cardiovascular: Normal rate, regular rhythm and normal heart sounds  Pulmonary/Chest: Effort normal and breath sounds normal  She has no wheezes

## 2018-02-15 NOTE — PROGRESS NOTES
Assessment/Plan:    Romayne Doyne was seen today for pre-op exam     Diagnoses and all orders for this visit:    Pre-operative clearance  Comments:  no contraindication to or  Hypertension, unspecified type  Comments:  stable    Chronic GERD  Comments:  stable    Primary localized osteoarthritis of right knee  Comments:  okay for knee surgery               Subjective:      Patient ID: Jerad Merritt is a 60 y o  female  Pt in for preop  Denies mi within 6 months  +htn-controlled  Denies dm, arrythmia,denines problem with gen anesthesia        The following portions of the patient's history were reviewed and updated as appropriate: She  has a past medical history of Anxiety; GERD (gastroesophageal reflux disease); History of palpitations; Hypertension; and Irritable bowel syndrome  She  does not have any pertinent problems on file  She  has a past surgical history that includes  section; Esophagogastroduodenoscopy (N/A, 2016); Knee arthroscopy (); and Cardiac catheterization  Her family history includes Cancer in her mother; Heart attack in her father  She  reports that she has quit smoking  She has never used smokeless tobacco  She reports that she does not drink alcohol or use drugs    Current Outpatient Prescriptions   Medication Sig Dispense Refill    albuterol (PROVENTIL HFA,VENTOLIN HFA) 90 mcg/act inhaler Inhale 2 puffs every 6 (six) hours as needed for wheezing 1 Inhaler 0    ALPRAZolam (XANAX) 0 25 mg tablet Take 1 tablet by mouth every 12 (twelve) hours as needed      ascorbic acid (VITAMIN C) 500 MG tablet Take 1 tablet (500 mg total) by mouth 2 (two) times a day Take with Ferrous sulfate, as part of pre-op Blood management protocol 60 tablet 0    ferrous sulfate 324 (65 Fe) mg Take 1 tablet (324 mg total) by mouth 2 (two) times a day before meals Take 1 pill BID, PRE-OP with Vit C, may be constipatory 60 tablet 0    folic acid (FOLVITE) 1 mg tablet Take 1 tablet (1 mg total) by mouth daily for 30 doses Take 1 pill po Qday PRE-OP, with Vit C, and Iron 30 tablet 0    Ibuprofen (ADVIL PO) Take by mouth as needed      levofloxacin (LEVAQUIN) 500 mg tablet Take 1 tablet (500 mg total) by mouth every 24 hours for 10 days 10 tablet 0    losartan (COZAAR) 50 mg tablet Take 50 mg by mouth every evening        metoprolol succinate (TOPROL-XL) 50 mg 24 hr tablet Take 50 mg by mouth every evening Pt hasnt taken it for awhile        pantoprazole (PROTONIX) 40 mg tablet Take 40 mg by mouth 2 (two) times a day   PARoxetine (PAXIL) 10 mg tablet Take 10 mg by mouth every evening        pravastatin (PRAVACHOL) 10 mg tablet Take 10 mg by mouth every evening         No current facility-administered medications for this visit        Current Outpatient Prescriptions on File Prior to Visit   Medication Sig    albuterol (PROVENTIL HFA,VENTOLIN HFA) 90 mcg/act inhaler Inhale 2 puffs every 6 (six) hours as needed for wheezing    ALPRAZolam (XANAX) 0 25 mg tablet Take 1 tablet by mouth every 12 (twelve) hours as needed    ascorbic acid (VITAMIN C) 500 MG tablet Take 1 tablet (500 mg total) by mouth 2 (two) times a day Take with Ferrous sulfate, as part of pre-op Blood management protocol    ferrous sulfate 324 (65 Fe) mg Take 1 tablet (324 mg total) by mouth 2 (two) times a day before meals Take 1 pill BID, PRE-OP with Vit C, may be constipatory    folic acid (FOLVITE) 1 mg tablet Take 1 tablet (1 mg total) by mouth daily for 30 doses Take 1 pill po Qday PRE-OP, with Vit C, and Iron    Ibuprofen (ADVIL PO) Take by mouth as needed    levofloxacin (LEVAQUIN) 500 mg tablet Take 1 tablet (500 mg total) by mouth every 24 hours for 10 days    losartan (COZAAR) 50 mg tablet Take 50 mg by mouth every evening      metoprolol succinate (TOPROL-XL) 50 mg 24 hr tablet Take 50 mg by mouth every evening Pt hasnt taken it for awhile      pantoprazole (PROTONIX) 40 mg tablet Take 40 mg by mouth 2 (two) times a day   PARoxetine (PAXIL) 10 mg tablet Take 10 mg by mouth every evening      pravastatin (PRAVACHOL) 10 mg tablet Take 10 mg by mouth every evening       No current facility-administered medications on file prior to visit  She is allergic to bactrim [sulfamethoxazole-trimethoprim]; prednisone; and sulfa antibiotics       Review of Systems   Constitutional: Negative for chills and fever  HENT: Positive for postnasal drip, sinus pain and sinus pressure  Negative for sore throat  Respiratory: Positive for cough and wheezing  Cardiovascular: Negative  Neurological: Positive for headaches  Objective: There were no vitals filed for this visit  Physical Exam   Constitutional: She appears well-developed and well-nourished  HENT:   Head: Normocephalic and atraumatic  Right Ear: External ear normal    Left Ear: External ear normal    Eyes: EOM are normal  Pupils are equal, round, and reactive to light  Neck: Normal range of motion  Neck supple  Cardiovascular: Normal rate and regular rhythm  Pulmonary/Chest: Effort normal and breath sounds normal    Abdominal: Soft   Bowel sounds are normal

## 2018-02-19 ENCOUNTER — APPOINTMENT (OUTPATIENT)
Dept: PREADMISSION TESTING | Facility: HOSPITAL | Age: 63
End: 2018-02-19
Payer: COMMERCIAL

## 2018-02-19 ENCOUNTER — LAB (OUTPATIENT)
Dept: LAB | Facility: HOSPITAL | Age: 63
End: 2018-02-19
Attending: ORTHOPAEDIC SURGERY
Payer: COMMERCIAL

## 2018-02-19 ENCOUNTER — TELEPHONE (OUTPATIENT)
Dept: OBGYN CLINIC | Facility: HOSPITAL | Age: 63
End: 2018-02-19

## 2018-02-19 DIAGNOSIS — M17.11 PRIMARY OSTEOARTHRITIS OF RIGHT KNEE: ICD-10-CM

## 2018-02-19 LAB
EST. AVERAGE GLUCOSE BLD GHB EST-MCNC: 123 MG/DL
HBA1C MFR BLD: 5.9 % (ref 4.2–6.3)

## 2018-02-19 PROCEDURE — 83036 HEMOGLOBIN GLYCOSYLATED A1C: CPT

## 2018-02-19 PROCEDURE — 36415 COLL VENOUS BLD VENIPUNCTURE: CPT

## 2018-02-19 NOTE — PRE-PROCEDURE INSTRUCTIONS
Pre-Surgery Instructions:   Medication Instructions    albuterol (PROVENTIL HFA,VENTOLIN HFA) 90 mcg/act inhaler Instructed patient per Anesthesia Guidelines   ALPRAZolam (XANAX) 0 25 mg tablet Instructed patient per Anesthesia Guidelines   ascorbic acid (VITAMIN C) 500 MG tablet Patient was instructed by Physician and understands   ferrous sulfate 324 (65 Fe) mg Patient was instructed by Physician and understands   folic acid (FOLVITE) 1 mg tablet Patient was instructed by Physician and understands   Ibuprofen (ADVIL PO) Instructed patient per Anesthesia Guidelines   levofloxacin (LEVAQUIN) 500 mg tablet Instructed patient per Anesthesia Guidelines   losartan (COZAAR) 50 mg tablet Instructed patient per Anesthesia Guidelines   metoprolol succinate (TOPROL-XL) 50 mg 24 hr tablet Instructed patient per Anesthesia Guidelines   pantoprazole (PROTONIX) 40 mg tablet Instructed patient per Anesthesia Guidelines   PARoxetine (PAXIL) 10 mg tablet Instructed patient per Anesthesia Guidelines   pravastatin (PRAVACHOL) 10 mg tablet Instructed patient per Anesthesia Guidelines  Patient had PAT appt  Medication list reviewed & instructed  As of 2 21 18 pt to stop advil  Instructed on tylenol only  PCP started on levaquin & PRN inhaler for bronchitis/sinusitis  At time of appt pt reports symptoms improving  Denies fever, chills, cough improving  Instructed pt to contact PCP if any worsening symptoms  Pt takes most meds at hs  Last dose of losartan: Monday 2 26 18  On am DOS pt to take protonix  Showering instructions & cloths given by office, reviewed at time of appt  Soap and incentive given to patient & instructed  All instructions verbally understood by patient  All questions answered  Callback number given  ACE/ARB Med Class     Do not take this medication the day before and the morning of the day of surgery/procedure    Antidepressant Med Class     Continue to take this medication on your normal schedule  If this is an oral medication and you take it in the morning, then you may take this medicine with a sip of water  Benzodiazepine antagonist Med Class     If this medication is needed please continue to take on your normal schedule  If you take it in the morning, then you may take this medicine with a sip of water  Beta blocker Med Class     Continue to take this heart medication on your normal schedule  If this is an oral medication and you take it in the morning, then you may take this medicine with a sip of water  Inhalational Med Class     Continue to take these inhaler medications on your normal schedule up to and including the day of surgery  NSAID Med Class     Stop taking this medication at least 3 days prior to surgery/procedure  Statin Med Class     Continue to take this medication on your normal schedule  If this is an oral medication and you take it in the morning, then you may take this medicine with a sip of water

## 2018-02-20 ENCOUNTER — TELEPHONE (OUTPATIENT)
Dept: OBGYN CLINIC | Facility: HOSPITAL | Age: 63
End: 2018-02-20

## 2018-02-22 ENCOUNTER — EVALUATION (OUTPATIENT)
Dept: PHYSICAL THERAPY | Age: 63
End: 2018-02-22
Payer: COMMERCIAL

## 2018-02-22 DIAGNOSIS — M25.561 CHRONIC PAIN OF RIGHT KNEE: Primary | ICD-10-CM

## 2018-02-22 DIAGNOSIS — G89.29 CHRONIC PAIN OF RIGHT KNEE: Primary | ICD-10-CM

## 2018-02-22 PROCEDURE — 97161 PT EVAL LOW COMPLEX 20 MIN: CPT | Performed by: PHYSICAL THERAPIST

## 2018-02-22 PROCEDURE — G8978 MOBILITY CURRENT STATUS: HCPCS | Performed by: PHYSICAL THERAPIST

## 2018-02-22 PROCEDURE — G8979 MOBILITY GOAL STATUS: HCPCS | Performed by: PHYSICAL THERAPIST

## 2018-02-22 NOTE — PROGRESS NOTES
PT Evaluation     Today's date: 2018  Patient name: Pete Mir  :   MRN: [id number]  Referring provider: Edith Samaniego MD  Dx:   Encounter Diagnosis     ICD-10-CM    1  Chronic pain of right knee M25 561     G89 29                   Assessment  Impairments: abnormal gait, abnormal muscle tone, abnormal or restricted ROM, abnormal movement, impaired physical strength, lacks appropriate home exercise program, pain with function and weight-bearing intolerance  Functional limitations: IADLs; work; recreational activities; stairs; gait  Patient is irritable  Assessment details: Patient is a 60 y o  female referred to PT by Dr Monalisa Carpenter with a dx of R knee pain  Patient reports progressively worsening R knee pain over the past 6 months and has elected to undergo R TKA on 2018  She is being seen for pre-operative evaluation  Patient was educated on a HEP to initiate upon going home after surgery, educated on stair negotiation and use of DME  She is scheduled to return to PT 1 week post operatively  No other referral appears necessary at this time  Understanding of Dx/Px/POC: good   Prognosis: good    Goals  Short Term goals - 4 weeks  1  Patient will be independent and compliant with a HEP  2   Patient will report a 50% decrease in pain complaints  Long Term goals - 8 weeks  1  Patient will report elimination of pain complaints  2   Patient will return to all work related activities without restriction  3   Patient will return to all recreational activities without restriction  4   Patient will ambulate with a normal gait pattern    5   Patient will negotiate stairs in a step over step pattern with use of 1 handrail     Plan  Patient would benefit from: skilled PT  Referral necessary: No  Planned modality interventions: cryotherapy and electrical stimulation/Russian stimulation  Planned therapy interventions: joint mobilization, manual therapy, neuromuscular re-education, balance, strengthening, therapeutic exercise, flexibility, functional ROM exercises and home exercise program  Frequency: 3x week  Duration in visits: 15  Duration in weeks: 5  Treatment plan discussed with: patient        Subjective Evaluation    History of Present Illness  Date of onset: 2017  Mechanism of injury: Patient reports severe medial R knee pain that has progressively worsened over the past 6 months  Recurrent probem  Quality of life: good    Pain  Current pain ratin  At best pain ratin  At worst pain ratin  Location: Medial anterior R knee   Quality: grinding and sharp  Aggravating factors: sitting and stair climbing  Progression: worsening    Social Support  Steps to enter house: yes  Stairs in house: yes   Lives in: multiple-level home  Lives with: adult children    Employment status: working    Diagnostic Tests  X-ray: abnormal  Treatments  No previous or current treatments  Current treatment: physical therapy  Patient Goals  Patient goals for therapy: decreased edema, decreased pain, increased motion, improved balance, increased strength, independence with ADLs/IADLs, return to sport/leisure activities and return to work          Objective     Observations   Left Knee   Negative for edema  Tenderness     Right Knee   Tenderness in the medial joint line       Active Range of Motion   Left Knee   Normal active range of motion    Right Knee   Flexion: 118 degrees with pain  Extension: 0 degrees with pain    Passive Range of Motion   Left Knee   Normal passive range of motion    Right Knee   Flexion: 120 degrees   Extension: 0 degrees     Mobility   Patellar Mobility:     Right Knee   Hypomobile: medial, lateral, superior and inferior     Strength/Myotome Testing     Left Knee   Normal strength    Right Knee   Flexion: 5  Extension: 5  Quadriceps contraction: fair    Tests     Additional Tests Details  TUG - 8s      Flowsheet Rows    Flowsheet Row Most Recent Value   PT/OT G-Codes   FOTO information reviewed  N/A   Assessment Type  Evaluation   G code set  Mobility: Walking & Moving Around   Mobility: Walking and Moving Around Current Status ()  CL   Mobility: Walking and Moving Around Goal Status ()  CJ          Precautions: HTN    Daily Treatment Diary     Manual              R knee patellar mobs             R knee flex/ext stretch                                                        Exercise Diary              Recumbent bike when able             Heel slide             saq             slr x 4             Front/lat step ups             Mini squats                                                                                                                                                                                                       Modalities              CP post

## 2018-02-23 ENCOUNTER — TELEPHONE (OUTPATIENT)
Dept: OBGYN CLINIC | Facility: HOSPITAL | Age: 63
End: 2018-02-23

## 2018-02-26 ENCOUNTER — APPOINTMENT (OUTPATIENT)
Dept: PHYSICAL THERAPY | Age: 63
End: 2018-02-26
Payer: COMMERCIAL

## 2018-02-27 ENCOUNTER — ANESTHESIA EVENT (OUTPATIENT)
Dept: PERIOP | Facility: HOSPITAL | Age: 63
DRG: 470 | End: 2018-02-27
Payer: COMMERCIAL

## 2018-02-28 ENCOUNTER — HOSPITAL ENCOUNTER (INPATIENT)
Facility: HOSPITAL | Age: 63
LOS: 2 days | Discharge: HOME | DRG: 470 | End: 2018-03-02
Attending: ORTHOPAEDIC SURGERY | Admitting: ORTHOPAEDIC SURGERY
Payer: COMMERCIAL

## 2018-02-28 ENCOUNTER — ANESTHESIA (OUTPATIENT)
Dept: PERIOP | Facility: HOSPITAL | Age: 63
DRG: 470 | End: 2018-02-28
Payer: COMMERCIAL

## 2018-02-28 DIAGNOSIS — M17.11 PRIMARY LOCALIZED OSTEOARTHRITIS OF RIGHT KNEE: ICD-10-CM

## 2018-02-28 DIAGNOSIS — Z96.651 S/P TOTAL KNEE ARTHROPLASTY, RIGHT: Primary | ICD-10-CM

## 2018-02-28 LAB
ABO GROUP BLD: NORMAL
BLD GP AB SCN SERPL QL: NEGATIVE
RH BLD: POSITIVE
SPECIMEN EXPIRATION DATE: NORMAL

## 2018-02-28 PROCEDURE — C1713 ANCHOR/SCREW BN/BN,TIS/BN: HCPCS | Performed by: ORTHOPAEDIC SURGERY

## 2018-02-28 PROCEDURE — C1776 JOINT DEVICE (IMPLANTABLE): HCPCS | Performed by: ORTHOPAEDIC SURGERY

## 2018-02-28 PROCEDURE — G8979 MOBILITY GOAL STATUS: HCPCS

## 2018-02-28 PROCEDURE — 86901 BLOOD TYPING SEROLOGIC RH(D): CPT | Performed by: ORTHOPAEDIC SURGERY

## 2018-02-28 PROCEDURE — 97163 PT EVAL HIGH COMPLEX 45 MIN: CPT

## 2018-02-28 PROCEDURE — 0SRC0J9 REPLACEMENT OF RIGHT KNEE JOINT WITH SYNTHETIC SUBSTITUTE, CEMENTED, OPEN APPROACH: ICD-10-PCS | Performed by: ORTHOPAEDIC SURGERY

## 2018-02-28 PROCEDURE — 86900 BLOOD TYPING SEROLOGIC ABO: CPT | Performed by: ORTHOPAEDIC SURGERY

## 2018-02-28 PROCEDURE — G8978 MOBILITY CURRENT STATUS: HCPCS

## 2018-02-28 PROCEDURE — 86850 RBC ANTIBODY SCREEN: CPT | Performed by: ORTHOPAEDIC SURGERY

## 2018-02-28 PROCEDURE — 27447 TOTAL KNEE ARTHROPLASTY: CPT | Performed by: ORTHOPAEDIC SURGERY

## 2018-02-28 DEVICE — ATTUNE PATELLA MEDIALIZED DOME 35MM CEMENTED AOX
Type: IMPLANTABLE DEVICE | Site: KNEE | Status: FUNCTIONAL
Brand: ATTUNE

## 2018-02-28 DEVICE — ATTUNE KNEE SYSTEM TIBIAL INSERT FIXED BEARING POSTERIOR STABILIZED 4 8MM AOX
Type: IMPLANTABLE DEVICE | Site: KNEE | Status: FUNCTIONAL
Brand: ATTUNE

## 2018-02-28 DEVICE — ATTUNE KNEE SYSTEM FEMORAL POSTERIOR STABILIZED NARROW SIZE 4N RIGHT CEMENTED
Type: IMPLANTABLE DEVICE | Site: KNEE | Status: FUNCTIONAL
Brand: ATTUNE

## 2018-02-28 DEVICE — SMARTSET HV HIGH VISCOSITY BONE CEMENT 40G
Type: IMPLANTABLE DEVICE | Site: KNEE | Status: FUNCTIONAL
Brand: SMARTSET

## 2018-02-28 DEVICE — ATTUNE KNEE SYSTEM TIBIAL BASE FIXED BEARING SIZE 3 CEMENTED
Type: IMPLANTABLE DEVICE | Site: KNEE | Status: FUNCTIONAL
Brand: ATTUNE

## 2018-02-28 RX ORDER — PROPOFOL 10 MG/ML
INJECTION, EMULSION INTRAVENOUS CONTINUOUS PRN
Status: DISCONTINUED | OUTPATIENT
Start: 2018-02-28 | End: 2018-02-28 | Stop reason: SURG

## 2018-02-28 RX ORDER — OXYCODONE HYDROCHLORIDE 5 MG/1
5 TABLET ORAL EVERY 4 HOURS PRN
Status: DISCONTINUED | OUTPATIENT
Start: 2018-02-28 | End: 2018-03-02 | Stop reason: HOSPADM

## 2018-02-28 RX ORDER — ACETAMINOPHEN 325 MG/1
650 TABLET ORAL EVERY 6 HOURS
Status: DISCONTINUED | OUTPATIENT
Start: 2018-02-28 | End: 2018-03-02 | Stop reason: HOSPADM

## 2018-02-28 RX ORDER — ONDANSETRON 2 MG/ML
4 INJECTION INTRAMUSCULAR; INTRAVENOUS ONCE AS NEEDED
Status: DISCONTINUED | OUTPATIENT
Start: 2018-02-28 | End: 2018-02-28 | Stop reason: HOSPADM

## 2018-02-28 RX ORDER — DOCUSATE SODIUM 100 MG/1
100 CAPSULE, LIQUID FILLED ORAL 2 TIMES DAILY
Status: DISCONTINUED | OUTPATIENT
Start: 2018-02-28 | End: 2018-03-02 | Stop reason: HOSPADM

## 2018-02-28 RX ORDER — ACETAMINOPHEN 325 MG/1
650 TABLET ORAL EVERY 6 HOURS
Qty: 90 TABLET | Refills: 0 | Status: SHIPPED | OUTPATIENT
Start: 2018-02-28 | End: 2018-12-11

## 2018-02-28 RX ORDER — BUPIVACAINE HYDROCHLORIDE 7.5 MG/ML
INJECTION, SOLUTION INTRASPINAL AS NEEDED
Status: DISCONTINUED | OUTPATIENT
Start: 2018-02-28 | End: 2018-02-28 | Stop reason: SURG

## 2018-02-28 RX ORDER — PROPOFOL 10 MG/ML
INJECTION, EMULSION INTRAVENOUS AS NEEDED
Status: DISCONTINUED | OUTPATIENT
Start: 2018-02-28 | End: 2018-02-28 | Stop reason: SURG

## 2018-02-28 RX ORDER — MORPHINE SULFATE 2 MG/ML
2 INJECTION, SOLUTION INTRAMUSCULAR; INTRAVENOUS EVERY 2 HOUR PRN
Status: DISCONTINUED | OUTPATIENT
Start: 2018-02-28 | End: 2018-03-02 | Stop reason: HOSPADM

## 2018-02-28 RX ORDER — ONDANSETRON 2 MG/ML
4 INJECTION INTRAMUSCULAR; INTRAVENOUS ONCE
Status: DISCONTINUED | OUTPATIENT
Start: 2018-02-28 | End: 2018-02-28

## 2018-02-28 RX ORDER — ALBUTEROL SULFATE 90 UG/1
2 AEROSOL, METERED RESPIRATORY (INHALATION) EVERY 6 HOURS PRN
Status: DISCONTINUED | OUTPATIENT
Start: 2018-02-28 | End: 2018-03-02 | Stop reason: HOSPADM

## 2018-02-28 RX ORDER — EPHEDRINE SULFATE 50 MG/ML
INJECTION, SOLUTION INTRAVENOUS AS NEEDED
Status: DISCONTINUED | OUTPATIENT
Start: 2018-02-28 | End: 2018-02-28 | Stop reason: SURG

## 2018-02-28 RX ORDER — OXYCODONE HYDROCHLORIDE 10 MG/1
10 TABLET ORAL EVERY 4 HOURS PRN
Status: DISCONTINUED | OUTPATIENT
Start: 2018-02-28 | End: 2018-03-02 | Stop reason: HOSPADM

## 2018-02-28 RX ORDER — MAGNESIUM HYDROXIDE 1200 MG/15ML
LIQUID ORAL AS NEEDED
Status: DISCONTINUED | OUTPATIENT
Start: 2018-02-28 | End: 2018-02-28 | Stop reason: HOSPADM

## 2018-02-28 RX ORDER — ALBUTEROL SULFATE 2.5 MG/3ML
2.5 SOLUTION RESPIRATORY (INHALATION) AS NEEDED
Status: DISCONTINUED | OUTPATIENT
Start: 2018-02-28 | End: 2018-02-28 | Stop reason: HOSPADM

## 2018-02-28 RX ORDER — SENNOSIDES 8.6 MG
1 TABLET ORAL DAILY
Status: DISCONTINUED | OUTPATIENT
Start: 2018-03-01 | End: 2018-03-02 | Stop reason: HOSPADM

## 2018-02-28 RX ORDER — FENTANYL CITRATE 50 UG/ML
INJECTION, SOLUTION INTRAMUSCULAR; INTRAVENOUS AS NEEDED
Status: DISCONTINUED | OUTPATIENT
Start: 2018-02-28 | End: 2018-02-28 | Stop reason: SURG

## 2018-02-28 RX ORDER — TRAMADOL HYDROCHLORIDE 50 MG/1
50 TABLET ORAL EVERY 6 HOURS
Qty: 40 TABLET | Refills: 0 | Status: SHIPPED | OUTPATIENT
Start: 2018-02-28 | End: 2018-03-10

## 2018-02-28 RX ORDER — OXYCODONE HYDROCHLORIDE 5 MG/1
TABLET ORAL
Qty: 60 TABLET | Refills: 0 | Status: SHIPPED | OUTPATIENT
Start: 2018-02-28 | End: 2018-03-22 | Stop reason: SDUPTHER

## 2018-02-28 RX ORDER — SODIUM CHLORIDE, SODIUM LACTATE, POTASSIUM CHLORIDE, CALCIUM CHLORIDE 600; 310; 30; 20 MG/100ML; MG/100ML; MG/100ML; MG/100ML
125 INJECTION, SOLUTION INTRAVENOUS CONTINUOUS
Status: DISCONTINUED | OUTPATIENT
Start: 2018-02-28 | End: 2018-03-02 | Stop reason: HOSPADM

## 2018-02-28 RX ORDER — FENTANYL CITRATE/PF 50 MCG/ML
50 SYRINGE (ML) INJECTION
Status: DISCONTINUED | OUTPATIENT
Start: 2018-02-28 | End: 2018-02-28 | Stop reason: HOSPADM

## 2018-02-28 RX ORDER — MIDAZOLAM HYDROCHLORIDE 1 MG/ML
INJECTION INTRAMUSCULAR; INTRAVENOUS AS NEEDED
Status: DISCONTINUED | OUTPATIENT
Start: 2018-02-28 | End: 2018-02-28 | Stop reason: SURG

## 2018-02-28 RX ORDER — DOCUSATE SODIUM 100 MG/1
100 CAPSULE, LIQUID FILLED ORAL 2 TIMES DAILY
Status: DISCONTINUED | OUTPATIENT
Start: 2018-02-28 | End: 2018-02-28

## 2018-02-28 RX ORDER — PRAVASTATIN SODIUM 10 MG
10 TABLET ORAL EVERY EVENING
Status: DISCONTINUED | OUTPATIENT
Start: 2018-02-28 | End: 2018-03-02 | Stop reason: HOSPADM

## 2018-02-28 RX ORDER — PAROXETINE 10 MG/1
10 TABLET, FILM COATED ORAL EVERY EVENING
Status: DISCONTINUED | OUTPATIENT
Start: 2018-02-28 | End: 2018-03-02 | Stop reason: HOSPADM

## 2018-02-28 RX ORDER — ONDANSETRON 2 MG/ML
4 INJECTION INTRAMUSCULAR; INTRAVENOUS EVERY 6 HOURS PRN
Status: DISCONTINUED | OUTPATIENT
Start: 2018-02-28 | End: 2018-03-02 | Stop reason: HOSPADM

## 2018-02-28 RX ORDER — PANTOPRAZOLE SODIUM 40 MG/1
40 TABLET, DELAYED RELEASE ORAL 2 TIMES DAILY
Status: DISCONTINUED | OUTPATIENT
Start: 2018-02-28 | End: 2018-03-02 | Stop reason: HOSPADM

## 2018-02-28 RX ORDER — TRAMADOL HYDROCHLORIDE 50 MG/1
50 TABLET ORAL EVERY 6 HOURS SCHEDULED
Status: DISCONTINUED | OUTPATIENT
Start: 2018-02-28 | End: 2018-03-02 | Stop reason: HOSPADM

## 2018-02-28 RX ORDER — ALPRAZOLAM 0.25 MG/1
0.25 TABLET ORAL
Status: DISCONTINUED | OUTPATIENT
Start: 2018-02-28 | End: 2018-03-02 | Stop reason: HOSPADM

## 2018-02-28 RX ORDER — SODIUM CHLORIDE, SODIUM LACTATE, POTASSIUM CHLORIDE, CALCIUM CHLORIDE 600; 310; 30; 20 MG/100ML; MG/100ML; MG/100ML; MG/100ML
125 INJECTION, SOLUTION INTRAVENOUS ONCE
Status: DISCONTINUED | OUTPATIENT
Start: 2018-02-28 | End: 2018-03-01

## 2018-02-28 RX ORDER — LOSARTAN POTASSIUM 50 MG/1
50 TABLET ORAL EVERY EVENING
Status: DISCONTINUED | OUTPATIENT
Start: 2018-03-01 | End: 2018-02-28

## 2018-02-28 RX ORDER — ROPIVACAINE HYDROCHLORIDE 5 MG/ML
INJECTION, SOLUTION EPIDURAL; INFILTRATION; PERINEURAL AS NEEDED
Status: DISCONTINUED | OUTPATIENT
Start: 2018-02-28 | End: 2018-02-28 | Stop reason: SURG

## 2018-02-28 RX ORDER — METOPROLOL SUCCINATE 50 MG/1
50 TABLET, EXTENDED RELEASE ORAL EVERY EVENING
Status: DISCONTINUED | OUTPATIENT
Start: 2018-02-28 | End: 2018-03-02 | Stop reason: HOSPADM

## 2018-02-28 RX ORDER — CALCIUM CARBONATE 200(500)MG
1000 TABLET,CHEWABLE ORAL DAILY PRN
Status: DISCONTINUED | OUTPATIENT
Start: 2018-02-28 | End: 2018-03-02 | Stop reason: HOSPADM

## 2018-02-28 RX ORDER — METOPROLOL SUCCINATE 50 MG/1
50 TABLET, EXTENDED RELEASE ORAL EVERY EVENING
Status: DISCONTINUED | OUTPATIENT
Start: 2018-02-28 | End: 2018-02-28

## 2018-02-28 RX ORDER — GABAPENTIN 300 MG/1
300 CAPSULE ORAL ONCE
Status: COMPLETED | OUTPATIENT
Start: 2018-02-28 | End: 2018-02-28

## 2018-02-28 RX ORDER — ACETAMINOPHEN 325 MG/1
975 TABLET ORAL ONCE
Status: COMPLETED | OUTPATIENT
Start: 2018-02-28 | End: 2018-02-28

## 2018-02-28 RX ORDER — METOCLOPRAMIDE HYDROCHLORIDE 5 MG/ML
10 INJECTION INTRAMUSCULAR; INTRAVENOUS ONCE AS NEEDED
Status: DISCONTINUED | OUTPATIENT
Start: 2018-02-28 | End: 2018-02-28 | Stop reason: HOSPADM

## 2018-02-28 RX ORDER — CHLORHEXIDINE GLUCONATE 4 G/100ML
SOLUTION TOPICAL DAILY PRN
Status: DISCONTINUED | OUTPATIENT
Start: 2018-02-28 | End: 2018-02-28

## 2018-02-28 RX ORDER — ALBUMIN, HUMAN INJ 5% 5 %
SOLUTION INTRAVENOUS CONTINUOUS PRN
Status: DISCONTINUED | OUTPATIENT
Start: 2018-02-28 | End: 2018-02-28 | Stop reason: SURG

## 2018-02-28 RX ADMIN — CEFAZOLIN SODIUM 2000 MG: 2 SOLUTION INTRAVENOUS at 21:02

## 2018-02-28 RX ADMIN — TRAMADOL HYDROCHLORIDE 50 MG: 50 TABLET ORAL at 18:24

## 2018-02-28 RX ADMIN — ACETAMINOPHEN 650 MG: 325 TABLET ORAL at 18:24

## 2018-02-28 RX ADMIN — FENTANYL CITRATE 50 MCG: 50 INJECTION, SOLUTION INTRAMUSCULAR; INTRAVENOUS at 10:49

## 2018-02-28 RX ADMIN — ALBUMIN HUMAN: 0.05 INJECTION, SOLUTION INTRAVENOUS at 11:30

## 2018-02-28 RX ADMIN — EPHEDRINE SULFATE 10 MG: 50 INJECTION, SOLUTION INTRAMUSCULAR; INTRAVENOUS; SUBCUTANEOUS at 10:56

## 2018-02-28 RX ADMIN — EPHEDRINE SULFATE 15 MG: 50 INJECTION, SOLUTION INTRAMUSCULAR; INTRAVENOUS; SUBCUTANEOUS at 11:10

## 2018-02-28 RX ADMIN — OXYCODONE HYDROCHLORIDE 10 MG: 10 TABLET ORAL at 15:51

## 2018-02-28 RX ADMIN — EPHEDRINE SULFATE 15 MG: 50 INJECTION, SOLUTION INTRAMUSCULAR; INTRAVENOUS; SUBCUTANEOUS at 11:27

## 2018-02-28 RX ADMIN — MORPHINE SULFATE 2 MG: 2 INJECTION, SOLUTION INTRAMUSCULAR; INTRAVENOUS at 18:24

## 2018-02-28 RX ADMIN — ACETAMINOPHEN 650 MG: 325 TABLET ORAL at 23:22

## 2018-02-28 RX ADMIN — OXYCODONE HYDROCHLORIDE 10 MG: 10 TABLET ORAL at 22:36

## 2018-02-28 RX ADMIN — GABAPENTIN 300 MG: 300 CAPSULE ORAL at 09:55

## 2018-02-28 RX ADMIN — FENTANYL CITRATE 50 MCG: 50 INJECTION, SOLUTION INTRAMUSCULAR; INTRAVENOUS at 10:08

## 2018-02-28 RX ADMIN — ONDANSETRON 4 MG: 2 INJECTION INTRAMUSCULAR; INTRAVENOUS at 22:36

## 2018-02-28 RX ADMIN — CEFAZOLIN SODIUM 2000 MG: 2 SOLUTION INTRAVENOUS at 10:55

## 2018-02-28 RX ADMIN — CEFAZOLIN SODIUM 2000 MG: 1 SOLUTION INTRAVENOUS at 10:50

## 2018-02-28 RX ADMIN — MIDAZOLAM HYDROCHLORIDE 2 MG: 1 INJECTION, SOLUTION INTRAMUSCULAR; INTRAVENOUS at 10:08

## 2018-02-28 RX ADMIN — SODIUM CHLORIDE, SODIUM LACTATE, POTASSIUM CHLORIDE, AND CALCIUM CHLORIDE 125 ML/HR: .6; .31; .03; .02 INJECTION, SOLUTION INTRAVENOUS at 09:43

## 2018-02-28 RX ADMIN — SODIUM CHLORIDE, SODIUM LACTATE, POTASSIUM CHLORIDE, AND CALCIUM CHLORIDE: .6; .31; .03; .02 INJECTION, SOLUTION INTRAVENOUS at 11:55

## 2018-02-28 RX ADMIN — PROPOFOL 50 MCG/KG/MIN: 10 INJECTION, EMULSION INTRAVENOUS at 10:56

## 2018-02-28 RX ADMIN — TRAMADOL HYDROCHLORIDE 50 MG: 50 TABLET ORAL at 23:22

## 2018-02-28 RX ADMIN — ROPIVACAINE HYDROCHLORIDE 20 ML: 5 INJECTION, SOLUTION EPIDURAL; INFILTRATION; PERINEURAL at 10:13

## 2018-02-28 RX ADMIN — EPHEDRINE SULFATE 10 MG: 50 INJECTION, SOLUTION INTRAMUSCULAR; INTRAVENOUS; SUBCUTANEOUS at 11:01

## 2018-02-28 RX ADMIN — PROPOFOL 50 MG: 10 INJECTION, EMULSION INTRAVENOUS at 10:55

## 2018-02-28 RX ADMIN — ONDANSETRON 4 MG: 2 INJECTION INTRAMUSCULAR; INTRAVENOUS at 16:38

## 2018-02-28 RX ADMIN — BUPIVACAINE HYDROCHLORIDE IN DEXTROSE 1.4 ML: 7.5 INJECTION, SOLUTION SUBARACHNOID at 10:53

## 2018-02-28 RX ADMIN — ACETAMINOPHEN 975 MG: 325 TABLET, FILM COATED ORAL at 09:55

## 2018-02-28 NOTE — CONSULTS
Consultation - Soraya Mortensen 60 y o  female MRN: [id number]    Unit/Bed#: CW3 349-01 Encounter: [id number]        History of Present Illness     HPI: Soraya Mortensen is a 60y o  year old female with a history of hypertension, hyperlipidemia, GERD, anxiety presented today under orthopedic service to undergo elective right total knee arthroplasty  She did well intraoperatively with minimal blood loss  We are asked to follow along for medical management  ROS:  Constitutional: Negative  HENT: Negative  Respiratory: Negative  Cardiovascular: Negative  Gastrointestinal: Negative  Musculoskeletal: + right knee pain  Neurological: Negative  Psychiatric/Behavioral: Negative  Historical Information   Past Medical History:   Diagnosis Date    Anxiety     GERD (gastroesophageal reflux disease)     History of palpitations     Hypertension     Irritable bowel syndrome      Past Surgical History:   Procedure Laterality Date    CARDIAC CATHETERIZATION       SECTION      ESOPHAGOGASTRODUODENOSCOPY N/A 2016    Procedure: ESOPHAGOGASTRODUODENOSCOPY (EGD); Surgeon: Vanda Beltre MD;  Location: [facility];   Service:     KNEE ARTHROSCOPY       Social History   History   Alcohol Use No     History   Drug Use No     History   Smoking Status    Former Smoker    Packs/day: 0 50    Years: 20 00   Smokeless Tobacco    Never Used     Comment: quit 13 years ago      Family History   Problem Relation Age of Onset    Cancer Mother     Heart attack Father        Meds/Allergies   current meds:  Current Facility-Administered Medications   Medication Dose Route Frequency    acetaminophen (TYLENOL) tablet 650 mg  650 mg Oral Q6H    albuterol (PROVENTIL HFA,VENTOLIN HFA) inhaler 2 puff  2 puff Inhalation Q6H PRN    ALPRAZolam (XANAX) tablet 0 25 mg  0 25 mg Oral HS PRN    calcium carbonate (TUMS) chewable tablet 1,000 mg  1,000 mg Oral Daily PRN    ceFAZolin (ANCEF) IVPB (premix) 2,000 mg  2,000 mg Intravenous Q8H    docusate sodium (COLACE) capsule 100 mg  100 mg Oral BID    [START ON 3/1/2018] enoxaparin (LOVENOX) subcutaneous injection 40 mg  40 mg Subcutaneous Daily    lactated ringers infusion  125 mL/hr Intravenous Continuous    lactated ringers infusion  125 mL/hr Intravenous Once    [START ON 3/1/2018] losartan (COZAAR) tablet 50 mg  50 mg Oral QPM    metoprolol succinate (TOPROL-XL) 24 hr tablet 50 mg  50 mg Oral QPM    morphine injection 2 mg  2 mg Intravenous Q2H PRN    oxyCODONE (ROXICODONE) immediate release tablet 10 mg  10 mg Oral Q4H PRN    oxyCODONE (ROXICODONE) IR tablet 5 mg  5 mg Oral Q4H PRN    pantoprazole (PROTONIX) EC tablet 40 mg  40 mg Oral BID    PARoxetine (PAXIL) tablet 10 mg  10 mg Oral QPM    pravastatin (PRAVACHOL) tablet 10 mg  10 mg Oral QPM    senna (SENOKOT) tablet 8 6 mg  1 tablet Oral Daily    traMADol (ULTRAM) tablet 50 mg  50 mg Oral Q6H [address]       PTA meds:   Prescriptions Prior to Admission   Medication    albuterol (PROVENTIL HFA,VENTOLIN HFA) 90 mcg/act inhaler    ALPRAZolam (XANAX) 0 25 mg tablet    ascorbic acid (VITAMIN C) 500 MG tablet    ferrous sulfate 324 (65 Fe) mg    folic acid (FOLVITE) 1 mg tablet    Ibuprofen (ADVIL PO)    losartan (COZAAR) 50 mg tablet    metoprolol succinate (TOPROL-XL) 50 mg 24 hr tablet    pantoprazole (PROTONIX) 40 mg tablet    PARoxetine (PAXIL) 10 mg tablet    pravastatin (PRAVACHOL) 10 mg tablet     Allergies   Allergen Reactions    Bactrim [Sulfamethoxazole-Trimethoprim] Hives, Itching and Rash    Prednisone Hives    Sulfa Antibiotics Itching and Rash       Objective   Vitals: Blood pressure (!) 106/42, pulse 56, temperature 97 9 °F (36 6 °C), resp  rate 15, height 5' (1 524 m), weight 70 8 kg (156 lb), SpO2 94 %  Physical Exam   Constitutional: Pt is in NAD, nontoxic   HENT: nares patent, oropharynx negative for thrush  Head: Normocephalic     Eyes: EOM are normal  Pupils are equal, round, and reactive to light  Neck: Neck supple  Cardiovascular: Normal rate and regular rhythm  No murmur heard  Pulmonary/Chest: Breath sounds normal  No respiratory distress  Pt has no wheezes  Pt has no rales  Abdominal: Soft  Bowel sounds are normal  Pt exhibits no distension  There is no tenderness  There is no rebound and no guarding  Extremities: no lower extremity edema noted  Neurological: Pt is alert and oriented to person, place, and time  Psychiatric: Pt has a normal mood and affect  Lab Results:                   No results found for: GLUCOSE    Labs reviewed    Imaging: reviewed  EKG, Pathology, and Other Studies: I have personally reviewed pertinent reports  VTE Prophylaxis: Enoxaparin (Lovenox)    Code Status: Level 1 - Full Code     Assessment/Plan     1  Right TKA: pain control per primary team  Encourage IS hourly and bowel regimen to help prevent narcotic induced pain medication  Follow up CBC in AM to assess degree of postoperative anemia  2  Hypertension: continue Toprol XL 50mg daily  Hold for sbp < 130  Resume Losartan on discharge  3  Hyperlipidemia: continue statin therapy  4  GERD: continue Protonix 40mg daily  5  Anxiety/depression: continue Paxil  6  DVT prophylaxis: continue Lovenox 40mg daily    Counseling / Coordination of Care  Total floor / unit time spent today 45 minutes  Greater than 50% of total time was spent with the patient and / or family counseling and / or coordination of care        Dunia Pimentel PA-C

## 2018-02-28 NOTE — PLAN OF CARE
Problem: PAIN - ADULT  Goal: Verbalizes/displays adequate comfort level or baseline comfort level  Interventions:  - Encourage patient to monitor pain and request assistance  - Assess pain using appropriate pain scale  - Administer analgesics based on type and severity of pain and evaluate response  - Implement non-pharmacological measures as appropriate and evaluate response  - Consider cultural and social influences on pain and pain management  - Notify physician/advanced practitioner if interventions unsuccessful or patient reports new pain  Outcome: Progressing      Problem: INFECTION - ADULT  Goal: Absence or prevention of progression during hospitalization  INTERVENTIONS:  - Assess and monitor for signs and symptoms of infection  - Monitor lab/diagnostic results  - Monitor all insertion sites, i e  indwelling lines, tubes, and drains  - Monitor endotracheal (as able) and nasal secretions for changes in amount and color  - [place] appropriate cooling/warming therapies per order  - Administer medications as ordered  - Instruct and encourage patient and family to use good hand hygiene technique  - Identify and instruct in appropriate isolation precautions for identified infection/condition  Outcome: Progressing    Goal: Absence of fever/infection during neutropenic period  INTERVENTIONS:  - Monitor WBC  - Implement neutropenic guidelines  Outcome: Progressing      Problem: SAFETY ADULT  Goal: Patient will remain free of falls  INTERVENTIONS:  - Assess patient frequently for physical needs  -  Identify cognitive and physical deficits and behaviors that affect risk of falls    -  [place] fall precautions as indicated by assessment   - Educate patient/family on patient safety including physical limitations  - Instruct patient to call for assistance with activity based on assessment  - Modify environment to reduce risk of injury  - Consider OT/PT consult to assist with strengthening/mobility  Outcome: Progressing    Goal: Maintain or return to baseline ADL function  INTERVENTIONS:  -  Assess patient's ability to carry out ADLs; assess patient's baseline for ADL function and identify physical deficits which impact ability to perform ADLs (bathing, care of mouth/teeth, toileting, grooming, dressing, etc )  - Assess/evaluate cause of self-care deficits   - Assess range of motion  - Assess patient's mobility; develop plan if impaired  - Assess patient's need for assistive devices and provide as appropriate  - Encourage maximum independence but intervene and supervise when necessary  ¯ Involve family in performance of ADLs  ¯ Assess for home care needs following discharge   ¯ Request OT consult to assist with ADL evaluation and planning for discharge  ¯ Provide patient education as appropriate  Outcome: Progressing    Goal: Maintain or return mobility status to optimal level  INTERVENTIONS:  - Assess patient's baseline mobility status (ambulation, transfers, stairs, etc )    - Identify cognitive and physical deficits and behaviors that affect mobility  - Identify mobility aids required to assist with transfers and/or ambulation (gait belt, sit-to-stand, lift, walker, cane, etc )  - [place] fall precautions as indicated by assessment  - Record patient progress and toleration of activity level on Mobility SBAR; progress patient to next Phase/Stage  - Instruct patient to call for assistance with activity based on assessment  - Request Rehabilitation consult to assist with strengthening/weightbearing, etc   Outcome: Progressing      Problem: DISCHARGE PLANNING  Goal: Discharge to home or other facility with appropriate resources  INTERVENTIONS:  - Identify barriers to discharge w/patient and caregiver  - Arrange for needed discharge resources and transportation as appropriate  - Identify discharge learning needs (meds, wound care, etc )  - Arrange for interpretive services to assist at discharge as needed  - Refer to Case Management Department for coordinating discharge planning if the patient needs post-hospital services based on physician/advanced practitioner order or complex needs related to functional status, cognitive ability, or social support system  Outcome: Progressing      Problem: Knowledge Deficit  Goal: Patient/family/caregiver demonstrates understanding of disease process, treatment plan, medications, and discharge instructions  Complete learning assessment and assess knowledge base    Interventions:  - Provide teaching at level of understanding  - Provide teaching via preferred learning methods  Outcome: Progressing

## 2018-02-28 NOTE — ANESTHESIA POSTPROCEDURE EVALUATION
Post-Op Assessment Note      CV Status:  Stable    Mental Status:  Alert and awake    Hydration Status:  Euvolemic    PONV Controlled:  Controlled    Airway Patency:  Patent    Post Op Vitals Reviewed: Yes          Staff: Anesthesiologist, CRNA           BP   98/60   Temp 97 7 °F (36 5 °C) (02/28/18 1210)    Pulse 72   Resp   20   SpO2   95

## 2018-02-28 NOTE — DISCHARGE INSTRUCTIONS
Discharge Instructions - Jamel Apple 60 y o  female MRN: [id number]  Unit/Bed#: PACU 08    Weight Bearing Status:                                           Weight Bearing as tolerated to the right lower extremity  DVT prophylaxis:  Complete course of Lovenox as directed    Pain:  Continue analgesics as directed    Showering Instructions:   Do not shower until 5 days    Dressing Instructions:   Keep dressing clean, dry and intact until follow up appointment  Driving Instructions:  No driving until cleared by Orthopaedic Surgery  PT/OT:  Continue PT/OT on outpatient basis as directed    Appt Instructions: If you do not have your appointment, please call the clinic at [phone]  Otherwise followup as scheduled below:    Contact the office sooner if you experience any increased numbness/tingling in the extremities        Miscellaneous:  None

## 2018-02-28 NOTE — ANESTHESIA PROCEDURE NOTES
Spinal Block    Patient location during procedure: OR  Start time: 2/28/2018 10:52 AM  Reason for block: at surgeon's request and primary anesthetic  Staffing  Anesthesiologist: Iban Cifuentes  Performed: anesthesiologist   Preanesthetic Checklist  Completed: patient identified, site marked, surgical consent, pre-op evaluation, timeout performed, IV checked, risks and benefits discussed and monitors and equipment checked  Spinal Block  Patient position: sitting  Prep: Betadine and site prepped and draped  Patient monitoring: frequent blood pressure checks, heart rate and continuous pulse ox  Approach: midline  Location: L4-5  Injection technique: single-shot  Needle  Needle type: pencil-tip   Needle gauge: 25 G  Needle length: 10 cm  Assessment  Sensory level: J0Qnthponac Assessment:  negative aspiration for heme, no paresthesia on injection and positive aspiration for clear CSF    Post-procedure:  site cleaned  Additional Notes  1 pass, 3 redirections  Scoliosis to right  Easy injection

## 2018-02-28 NOTE — PHYSICAL THERAPY NOTE
Physical Therapy Evaluation     Patient's Name: Roro Ley    Admitting Diagnosis  Primary localized osteoarthritis of right knee [M17 11]    Problem List  Patient Active Problem List   Diagnosis    Primary localized osteoarthritis of right knee    Bilateral knee pain    Chronic GERD    Depression    Dyslipidemia    Hypertension       Past Medical History  Past Medical History:   Diagnosis Date    Anxiety     GERD (gastroesophageal reflux disease)     History of palpitations     Hypertension     Irritable bowel syndrome        Past Surgical History  Past Surgical History:   Procedure Laterality Date    CARDIAC CATHETERIZATION       SECTION      ESOPHAGOGASTRODUODENOSCOPY N/A 2016    Procedure: ESOPHAGOGASTRODUODENOSCOPY (EGD); Surgeon: Preston Pedraza MD;  Location: [facility]; Service:     KNEE ARTHROSCOPY  20 1732   Note Type   Note type Eval only   Pain Assessment   Pain Assessment 0-10   Pain Score 3   Pain Type Acute pain;Surgical pain   Pain Location Knee   Pain Orientation Right   Pain Descriptors Aching;Discomfort   Pain Frequency Constant/continuous   Pain Onset Ongoing   Clinical Progression Gradually worsening  (RN informed)   Effect of Pain on Daily Activities No increased pain during mobilization but more discomfort once resting in the chair; RN informed  Patient's Stated Pain Goal No pain   Hospital Pain Intervention(s) Medication (See MAR); Cold applied;Repositioned; Ambulation/increased activity; Distraction;Elevated; Emotional support   Response to Interventions general (R) knee discomfort (posterior)   Home Living   Type of Home House   Home Layout Two level  (15 steps w/ hand rail to main living area; denies KANDI)   Prior Function   Level of [place] Independent with ADLs and functional mobility  (amb w/o AD)   Lives With Family  (gr dtr and her SO)   Falls in the last 6 months (one fall to her knees; denies injuries) Restrictions/Precautions   RLE Weight Bearing Per Order WBAT   Braces or Orthoses (denies at baseline)   Other Precautions Fall Risk;Telemetry;Multiple lines;Pain  (chair alarm placed and activated at the end of session)   General   Additional Pertinent History cleared for assessment (spoke to nsg)   Family/Caregiver Present Yes  (sister and sister in law; restorative tech also present)   Cognition   Overall Cognitive Status WFL   Arousal/Participation Alert   Orientation Level Oriented to person;Oriented to place;Oriented to situation   Memory Within functional limits   Following Commands Follows one step commands without difficulty   Comments Pt is observed in bed; responds to questions appropriately; pleasant and cooperative; appears to be generally apprehensive about mobilization but agreeable to try; reports (R) leg feels uncomfortable while laying   RUE Assessment   RUE Assessment WFL  (AROM)   LUE Assessment   LUE Assessment WFL  (AROM)   RLE Assessment   RLE Assessment X  (decreased AROM hip and knee)   Strength RLE   RLE Overall Strength (fair - hip; poor - knee; fair ankle (grossly))   LLE Assessment   LLE Assessment WFL  (AROM)   Strength LLE   LLE Overall Strength 4-/5   Light Touch   RLE Light Touch (some numbness dorsal foot; RN informed)   Bed Mobility   Supine to Sit 4  Minimal assistance   Additional items Assist x 1;Verbal cues;LE management;HOB elevated  (stand by (A) of 2nd)   Additional Comments Pt was reclined in the chair w/ LE elevated at the end of session; pillow placed for (R) LE support while assuring knee remains in extension; (B) foot pumps back on and activated; ice to (R) knee; call bell and phone placed w/in reach;   Transfers   Sit to Stand 4  Minimal assistance  (reviewed the technique)   Additional items Assist x 1;Verbal cues  (stand by (A) of 2nd)   Stand to Sit 4  Minimal assistance  (reviewed the technique)   Additional items Assist x 1;Verbal cues  (stand by (A) of 2nd) Ambulation/Elevation   Gait pattern Excessively slow; Step to;Decreased R stance; Short stride   Gait Assistance 4  Minimal assist  (reviewed turning technique)   Additional items Assist x 1;Verbal cues; Tactile cues  (stand by (A) of 2nd; )   Assistive Device Rolling walker   Distance 10 ft   Balance   Static Sitting Fair   Static Standing Poor +   Ambulatory Poor +   Activity Tolerance   Activity Tolerance Patient limited by pain   Nurse Made Aware spoke to [address]   Assessment   Prognosis Good   Problem List Decreased strength;Decreased range of motion;Decreased endurance; Impaired balance;Decreased mobility;Pain   Assessment Pt is 60 y o  admitted with Dx of Primary localized osteoarthritis of right knee and underwent (R) TKA on 2/28/2018  Pt 's comorbidities affecting POC include: anxiety, palpitations, HTN and personal factors of: multiple steps in the house  Pt's clinical presentation is currently unstable/unpredictable which is evident in postop discomfort and guarding, general apprehension about mobilization, need for input for task focus and mobility technique and need for min assist w/ all phases of mobility incl amb w/ rw when usually mobilizing independently and w/o AD  Pt presents w/ postop pain and guarding, decreased (R) LE AROM, decreased (R) LE strength, decreased functional endurance and activity tolerance, min impaired balance, min gait deviations and fall risk  Will cont to follow pt in PT for progressive mobilization to address above functional deficits and to max level of (I), endurance, and safety  Otherwise, anticipate pt will return home w/ available family support upon D/C provided she cont improving w/ mobility skills, safety, and endurance (incl on the steps) and when medically cleared; home PT follow up may need to be considered depending on progress and home set-up concerns; will follow     Barriers to Discharge (steps in the house)   Goals   Patient Goals none expressed   STG Expiration Date 03/10/18   Short Term Goal #1 7-10 days  Pt will amb 150 ft w/ rw, mod (I)  Pt will negotiate 15 steps w/ hand rail and SPC PRN, mod (I)  Pt will achieve (I) level w/ bed mob  Pt will perform transfers w/ mod (I)  Plan   Treatment/Interventions Functional transfer training;LE strengthening/ROM; Elevations; Therapeutic exercise; Endurance training;Bed mobility;Gait training;Spoke to nursing;Family   PT Frequency 7x/wk; Twice a day   Recommendation   Recommendation Home PT; Home with family support   Equipment Recommended Walker   Modified Holderness Scale   Modified Holderness Scale 4   Barthel Index   Feeding 10   Bathing 0   Grooming Score 5   Dressing Score 5   Bladder Score 0   Bowels Score 10   Toilet Use Score 5   Transfers (Bed/Chair) Score 10   Mobility (Level Surface) Score 0   Stairs Score 0   Barthel Index Score 45       Uli Cea, PT

## 2018-02-28 NOTE — RESTORATIVE TECHNICIAN NOTE
Restorative Specialist Mobility Note       Activity: Bedrest, Dangle, Stand at bedside, Turn, Ambulate in room, Chair     Assistive Device: Front wheel walker     Ambulation Response: Tolerated fairly well  Repositioned: Sitting, Up in chair                    Assisted PT during session  For all/additional information please see PT note(s)

## 2018-02-28 NOTE — PLAN OF CARE
Problem: PHYSICAL THERAPY ADULT  Goal: Performs mobility at highest level of function for planned discharge setting  See evaluation for individualized goals  Treatment/Interventions: Functional transfer training, LE strengthening/ROM, Elevations, Therapeutic exercise, Endurance training, Bed mobility, Gait training, Spoke to nursing, Family  Equipment Recommended: Merlin Sovereign       See flowsheet documentation for full assessment, interventions and recommendations  Prognosis: Good  Problem List: Decreased strength, Decreased range of motion, Decreased endurance, Impaired balance, Decreased mobility, Pain  Assessment: Pt is 60 y o  admitted with Dx of Primary localized osteoarthritis of right knee and underwent (R) TKA on 2/28/2018  Pt 's comorbidities affecting POC include: anxiety, palpitations, HTN and personal factors of: multiple steps in the house  Pt's clinical presentation is currently unstable/unpredictable which is evident in postop discomfort and guarding, general apprehension about mobilization, need for input for task focus and mobility technique and need for min assist w/ all phases of mobility incl amb w/ rw when usually mobilizing independently and w/o AD  Pt presents w/ postop pain and guarding, decreased (R) LE AROM, decreased (R) LE strength, decreased functional endurance and activity tolerance, min impaired balance, min gait deviations and fall risk  Will cont to follow pt in PT for progressive mobilization to address above functional deficits and to max level of (I), endurance, and safety  Otherwise, anticipate pt will return home w/ available family support upon D/C provided she cont improving w/ mobility skills, safety, and endurance (incl on the steps) and when medically cleared; home PT follow up may need to be considered depending on progress and home set-up concerns; will follow    Barriers to Discharge:  (steps in the house)     Recommendation: Home PT, Home with family support          See flowsheet documentation for full assessment

## 2018-02-28 NOTE — ANESTHESIA PROCEDURE NOTES
Peripheral Block    Patient location during procedure: holding area  Start time: 2/28/2018 10:10 AM  Reason for block: at surgeon's request and post-op pain management  Staffing  Anesthesiologist: Ariana Edwards  Performed: anesthesiologist   Preanesthetic Checklist  Completed: patient identified, site marked, surgical consent, pre-op evaluation, timeout performed, IV checked, risks and benefits discussed and monitors and equipment checked  Peripheral Block  Patient position: supine  Prep: ChloraPrep  Patient monitoring: heart rate, continuous pulse ox and frequent blood pressure checks  Block type: adductor canal block  Laterality: right  Injection technique: single-shot  Procedures: ultrasound guided  Ultrasound permanent image saved  Local infiltration: ropivacaine (with epi 1:400K)  Infiltration strength: 0 5 %  Dose: 20 mL  Needle  Needle type: Stimuplex   Needle gauge: 22 G  Needle length: 10 cm  Needle localization: ultrasound guidance  Assessment  Injection assessment: incremental injection, local visualized surrounding nerve on ultrasound, negative aspiration for heme and no paresthesia on injection  Paresthesia pain: none  Heart rate change: no  Slow fractionated injection: yes  Post-procedure:  site cleaned  patient tolerated the procedure well with no immediate complications

## 2018-02-28 NOTE — OP NOTE
OPERATIVE REPORT  PATIENT NAME: Emmett Vasquez    :    MRN: [id number]  Pt Location: [facility] OR ROOM 04    SURGERY DATE: 2018    Surgeon(s) and Role:     * Mariela Aaron MD - Primary     * Philip Wise PA-C - Areli Merrill MD - Assisting    Preop Diagnosis:  Primary localized osteoarthritis of right knee [M17 11]    Post-Op Diagnosis Codes:     * Primary localized osteoarthritis of right knee [M17 11]    Procedure(s) (LRB):  ARTHROPLASTY KNEE TOTAL (Right)    Specimen(s):  * No specimens in log *    Estimated Blood Loss:   Minimal    Drains:       Anesthesia Type:   Choice    Operative Indications:  Primary localized osteoarthritis of right knee [M17 11]      Operative Findings:  depuy attune   Femur-4N   Poly-8   Tibia-3   [id number]       Complications:   None    Procedure and Technique: Following induction of adequate level of spinal anesthesia, Villafuerte catheters patient's bladder  Antibiotics were administered  The right thigh was then fitted with a thigh-high tourniquet  The right lower extremity was then prepped and draped sterilely  The right lower extremity was exsanguinated gravity, the tourniquet inflated to 300 mm of mercury  A midline knee incision was created with the knee in flexion  Full-thickness flaps raised get to the extensor mechanism  A medial arthrotomy was created to open the knee joint  Bony and soft tissue releases were performed where necessary  The distal femoral cut was then made 6° valgus  This was made over a long intramedullary dave  The proximal tibial cut was made next  As this was a varus knee, 2 mm reference medially  Care was taken in order to protect the integrity medial collateral, lateral collateral, and posterior structures during these maneuvers  The distal femoral sizing guide was then used, and the appropriate form 1 cutting blocks impacted  The anterior, posterior, chamfer cuts then made    The box cut was then made for the posterior stabilized unit  With the trial components in, the knee was taken through range of motion, and found to be capable of full extension, good flexion, no mid flexion valgus instability  The patella was then resurfaced will utilize the manufacture's equipment, was found to be a size 35 mm button  The trial components removed, and the knee was prepared for insertion of cemented components  The cemented tibia, cemented femur, trial poly, cemented patella placed  Excess cement was removed, the knee was brought into extension  The cement was allowed to cure  The trial poly was taken out, the knee was packed off  The tourniquet was deflated, hemostasis was secured  The insert polyethylene was then snapped into position  The knee was taken through a final range of motion, and found to be capable full extension, good flexion, no mid flexion valgus instability, and excellent patellar tracking  Satisfied with the extent of surgery, the wounds then flushed with saline and closed  A Betadine soak was initiated  A drain was placed deep and brought out via a separate lateral stab incision  The arthrotomy was closed number Vicryl suture  The subcu tissue closed 2 Vicryl suture  The skin was closed staples  Sterile dressings applied   She was then transferred to recovery room in stable condition with plans to include physical therapy for weightbear to tolerance, she will require DVT prophylaxis with Lovenox   I was present for the entire procedure    Patient Disposition:  PACU     SIGNATURE: Marleen Pyle MD  DATE: February 28, 2018  TIME: 12:03 PM

## 2018-02-28 NOTE — ANESTHESIA PREPROCEDURE EVALUATION
Review of Systems/Medical History  Patient summary reviewed  Chart reviewed  No history of anesthetic complications     Cardiovascular  EKG reviewed, Exercise tolerance: good,  Hypertension ,    Pulmonary  Recent URI (given inhaler, levaquin by PCP for post nasal drip - denies cough/chest congestion - now feels this is allergies) ,        GI/Hepatic    GERD well controlled,        Negative  ROS        Endo/Other  Negative endo/other ROS   Obesity (BMI 30)    GYN  Negative gynecology ROS          Hematology  Negative hematology ROS      Musculoskeletal    Arthritis     Neurology  Negative neurology ROS      Psychology   Anxiety, Depression ,            Physical Exam    Airway    Mallampati score: II  TM Distance: >3 FB  Neck ROM: full     Dental   No notable dental hx     Cardiovascular      Pulmonary      Other Findings       Lab Results   Component Value Date    WBC 8 49 02/12/2018    HGB 14 3 02/12/2018     02/12/2018     Lab Results   Component Value Date     02/12/2018    K 4 1 02/12/2018    BUN 12 02/12/2018    CREATININE 0 74 02/12/2018     Lab Results   Component Value Date    PTT 31 02/12/2018      Lab Results   Component Value Date    INR 0 94 02/12/2018     Blood type O+/antibody neg  Lab Results   Component Value Date    HGBA1C 5 9 02/19/2018 02/12/18 1125 XR chest pa & lateral    Impression:   No acute cardiopulmonary disease  Anesthesia Plan  ASA Score- 2     Anesthesia Type- spinal and regional with ASA Monitors  Additional Monitors:   Airway Plan:     Comment: Spinal + adductor canal block, backup GA  Plan Factors-    Induction- intravenous  Postoperative Plan-     Informed Consent- Anesthetic plan and risks discussed with patient  I personally reviewed this patient with the CRNA  Discussed and agreed on the Anesthesia Plan with the CRNA  Dara Soulier

## 2018-02-28 NOTE — INTERVAL H&P NOTE
H&P reviewed  After examining the patient I find no changes in the patients condition since the H&P had been written      Preop for right total knee replacement

## 2018-02-28 NOTE — H&P (VIEW-ONLY)
60 y o female with right knee arthritis presenting for evaluation  She has previously been seeing Dr Rudi Yusuf for right knee pain and has past surgical history of an arthroscopic partial meniscectomy in   Since she has had progressive insidious atraumatic onset of right knee pain  She has had a previous steroid injection this past November which gave her minimal symptom relief  Pain is made worse with the knee in a bent position and is most bothersome when she is driving or sitting for prolonged period times  It is also bothersome when she walks for prolonged periods  She says her knee is of the septum 1 is more painful in the evening  She has also tried bracing and home exercises which the provide relief  Denies any new injury, numbness tingling, fevers or chills    Review of Systems  Review of systems negative unless otherwise specified in HPI    Past Medical History  Past Medical History:   Diagnosis Date    Anxiety     GERD (gastroesophageal reflux disease)     Hypertension     Stomach disorder        Past Surgical History  Past Surgical History:   Procedure Laterality Date    CARDIAC SURGERY       SECTION      ESOPHAGOGASTRODUODENOSCOPY N/A 2016    Procedure: ESOPHAGOGASTRODUODENOSCOPY (EGD); Surgeon: Dev Covington MD;  Location: [facility]; Service:     KNEE ARTHROSCOPY         Current Medications  Current Outpatient Prescriptions on File Prior to Visit   Medication Sig Dispense Refill    losartan (COZAAR) 50 mg tablet Take 50 mg by mouth daily   metoprolol succinate (TOPROL-XL) 50 mg 24 hr tablet Take 50 mg by mouth daily  Pt hasnt taken it for awhile      pantoprazole (PROTONIX) 40 mg tablet Take 40 mg by mouth 2 (two) times a day   PARoxetine (PAXIL) 10 mg tablet Take 10 mg by mouth every morning   pravastatin (PRAVACHOL) 10 mg tablet Take 10 mg by mouth daily  No current facility-administered medications on file prior to visit          Recent Labs (HCT,HGB,PT,INR,ESR,CRP,GLU,HgA1C)  @LABALLVALUEIP(HCT,HGB,WBC,PT,INR,ESR,CRP,GLUCOSE,HGBA1C)@      Physical exam  · General: Awake, Alert, Oriented  · Eyes: Pupils equal, round and reactive to light  · Heart: regular rate and rhythm  · Lungs: No audible wheezing, CTAB  · Abdomen: soft  Right lower extremity  · Varus alignment  · Skin intact  · Extension 5°, flexion full  · Tender palpation over medial joint line  · No knee effusion  · Knee stable in sagittal and coronal planes  · 5 out of 5 quad and hamstring strength  · Sensation intact L1-S1  Imaging  X-rays of bilateral knees shows joint space narrowing, subchondral sclerosis and osteophytic changes in the right knee more so in the medial compartment      Assessment/Plan:   60 y  o female with right knee arthritis has been refractory to conservative treatments including injections, bracing, and exercises    · Treatment options were discussed with the patient including both continued conservative care and surgical management  A joint decision was made to proceed with a right total knee arthroplasty  Informed consent was obtained she is booked for surgery accordingly  Risks and benefits were explained including bleeding, infection, persistent knee pain, blood clot, pulmonary embolism    · Preoperative prescriptions were given in the office today  · We will see her back in the office postoperatively

## 2018-03-01 ENCOUNTER — DOCUMENTATION (OUTPATIENT)
Dept: OBGYN CLINIC | Facility: HOSPITAL | Age: 63
End: 2018-03-01

## 2018-03-01 VITALS
TEMPERATURE: 99.2 F | HEART RATE: 77 BPM | OXYGEN SATURATION: 94 % | DIASTOLIC BLOOD PRESSURE: 56 MMHG | RESPIRATION RATE: 16 BRPM | BODY MASS INDEX: 30.63 KG/M2 | SYSTOLIC BLOOD PRESSURE: 114 MMHG | WEIGHT: 156 LBS | HEIGHT: 60 IN

## 2018-03-01 LAB
ANION GAP SERPL CALCULATED.3IONS-SCNC: 5 MMOL/L (ref 4–13)
BUN SERPL-MCNC: 15 MG/DL (ref 5–25)
CALCIUM SERPL-MCNC: 8 MG/DL (ref 8.3–10.1)
CHLORIDE SERPL-SCNC: 101 MMOL/L (ref 100–108)
CO2 SERPL-SCNC: 28 MMOL/L (ref 21–32)
CREAT SERPL-MCNC: 0.77 MG/DL (ref 0.6–1.3)
ERYTHROCYTE [DISTWIDTH] IN BLOOD BY AUTOMATED COUNT: 14.1 % (ref 11.6–15.1)
GFR SERPL CREATININE-BSD FRML MDRD: 82 ML/MIN/1.73SQ M
GLUCOSE SERPL-MCNC: 139 MG/DL (ref 65–140)
HCT VFR BLD AUTO: 30.1 % (ref 34.8–46.1)
HGB BLD-MCNC: 10 G/DL (ref 11.5–15.4)
MCH RBC QN AUTO: 28.6 PG (ref 26.8–34.3)
MCHC RBC AUTO-ENTMCNC: 33.2 G/DL (ref 31.4–37.4)
MCV RBC AUTO: 86 FL (ref 82–98)
PLATELET # BLD AUTO: 195 THOUSANDS/UL (ref 149–390)
PMV BLD AUTO: 11 FL (ref 8.9–12.7)
POTASSIUM SERPL-SCNC: 3.6 MMOL/L (ref 3.5–5.3)
RBC # BLD AUTO: 3.5 MILLION/UL (ref 3.81–5.12)
SODIUM SERPL-SCNC: 134 MMOL/L (ref 136–145)
WBC # BLD AUTO: 10.1 THOUSAND/UL (ref 4.31–10.16)

## 2018-03-01 PROCEDURE — G8987 SELF CARE CURRENT STATUS: HCPCS

## 2018-03-01 PROCEDURE — 80048 BASIC METABOLIC PNL TOTAL CA: CPT | Performed by: ORTHOPAEDIC SURGERY

## 2018-03-01 PROCEDURE — 97166 OT EVAL MOD COMPLEX 45 MIN: CPT

## 2018-03-01 PROCEDURE — 97116 GAIT TRAINING THERAPY: CPT

## 2018-03-01 PROCEDURE — 97530 THERAPEUTIC ACTIVITIES: CPT

## 2018-03-01 PROCEDURE — 97535 SELF CARE MNGMENT TRAINING: CPT

## 2018-03-01 PROCEDURE — 99024 POSTOP FOLLOW-UP VISIT: CPT | Performed by: ORTHOPAEDIC SURGERY

## 2018-03-01 PROCEDURE — G8988 SELF CARE GOAL STATUS: HCPCS

## 2018-03-01 PROCEDURE — 97110 THERAPEUTIC EXERCISES: CPT

## 2018-03-01 PROCEDURE — 85027 COMPLETE CBC AUTOMATED: CPT | Performed by: ORTHOPAEDIC SURGERY

## 2018-03-01 RX ADMIN — SODIUM CHLORIDE, SODIUM LACTATE, POTASSIUM CHLORIDE, AND CALCIUM CHLORIDE 125 ML/HR: .6; .31; .03; .02 INJECTION, SOLUTION INTRAVENOUS at 01:56

## 2018-03-01 RX ADMIN — CEFAZOLIN SODIUM 2000 MG: 2 SOLUTION INTRAVENOUS at 03:02

## 2018-03-01 RX ADMIN — TRAMADOL HYDROCHLORIDE 50 MG: 50 TABLET ORAL at 23:19

## 2018-03-01 RX ADMIN — ACETAMINOPHEN 650 MG: 325 TABLET ORAL at 18:00

## 2018-03-01 RX ADMIN — MORPHINE SULFATE 2 MG: 2 INJECTION, SOLUTION INTRAMUSCULAR; INTRAVENOUS at 01:51

## 2018-03-01 RX ADMIN — ACETAMINOPHEN 650 MG: 325 TABLET ORAL at 23:19

## 2018-03-01 RX ADMIN — DOCUSATE SODIUM 100 MG: 100 CAPSULE, LIQUID FILLED ORAL at 18:00

## 2018-03-01 RX ADMIN — ONDANSETRON 4 MG: 2 INJECTION INTRAMUSCULAR; INTRAVENOUS at 12:31

## 2018-03-01 RX ADMIN — OXYCODONE HYDROCHLORIDE 10 MG: 10 TABLET ORAL at 23:22

## 2018-03-01 RX ADMIN — ENOXAPARIN SODIUM 40 MG: 40 INJECTION SUBCUTANEOUS at 07:59

## 2018-03-01 RX ADMIN — PRAVASTATIN SODIUM 10 MG: 10 TABLET ORAL at 18:00

## 2018-03-01 RX ADMIN — SENNOSIDES 8.6 MG: 8.6 TABLET, FILM COATED ORAL at 07:59

## 2018-03-01 RX ADMIN — TRAMADOL HYDROCHLORIDE 50 MG: 50 TABLET ORAL at 18:00

## 2018-03-01 RX ADMIN — PAROXETINE HYDROCHLORIDE 10 MG: 10 TABLET, FILM COATED ORAL at 18:01

## 2018-03-01 RX ADMIN — PANTOPRAZOLE SODIUM 40 MG: 40 TABLET, DELAYED RELEASE ORAL at 18:00

## 2018-03-01 RX ADMIN — ACETAMINOPHEN 650 MG: 325 TABLET ORAL at 05:07

## 2018-03-01 RX ADMIN — TRAMADOL HYDROCHLORIDE 50 MG: 50 TABLET ORAL at 12:32

## 2018-03-01 RX ADMIN — OXYCODONE HYDROCHLORIDE 10 MG: 10 TABLET ORAL at 14:19

## 2018-03-01 RX ADMIN — OXYCODONE HYDROCHLORIDE 10 MG: 10 TABLET ORAL at 07:58

## 2018-03-01 RX ADMIN — OXYCODONE HYDROCHLORIDE 10 MG: 10 TABLET ORAL at 18:01

## 2018-03-01 RX ADMIN — Medication 1000 MG: at 23:42

## 2018-03-01 RX ADMIN — DOCUSATE SODIUM 100 MG: 100 CAPSULE, LIQUID FILLED ORAL at 07:59

## 2018-03-01 RX ADMIN — TRAMADOL HYDROCHLORIDE 50 MG: 50 TABLET ORAL at 05:07

## 2018-03-01 RX ADMIN — ACETAMINOPHEN 650 MG: 325 TABLET ORAL at 12:32

## 2018-03-01 RX ADMIN — PANTOPRAZOLE SODIUM 40 MG: 40 TABLET, DELAYED RELEASE ORAL at 07:58

## 2018-03-01 RX ADMIN — ONDANSETRON 4 MG: 2 INJECTION INTRAMUSCULAR; INTRAVENOUS at 05:07

## 2018-03-01 NOTE — CASE MANAGEMENT
Initial Clinical Review    Age/Sex: 60 y o  female admitted on 2/28 for elective surgery - OR    Surgery Date: 2/28    Procedure: S/P ARTHROPLASTY KNEE TOTAL (Right Knee)    Anesthesia: Spinal, Regional    Admission Orders: Date/Time/Statement: Inpatient 2/28/18 @ 1212 Med Surg     Orders Placed This Encounter   Procedures    Inpatient Admission     Standing Status:   Standing     Number of Occurrences:   1     Order Specific Question:   Admitting Physician     Answer:   Ree Malhotra [[id number]]     Order Specific Question:   Level of Care     Answer:   Med Surg [[id number]]     Order Specific Question:   Estimated length of stay     Answer:   More than 2 Midnights     Order Specific Question:   Certification     Answer:   I certify that inpatient services are medically necessary for this patient for a duration of greater than two midnights  See H&P and MD Progress Notes for additional information about the patient's course of treatment  Vital Signs: BP (!) 107/48 (BP Location: Left arm)   Pulse 65   Temp 99 °F (37 2 °C) (Oral)   Resp 16   Ht 5' (1 524 m)   Wt 70 8 kg (156 lb)   SpO2 94%   BMI 30 47 kg/m²     Diet:        Diet Orders            Start     Ordered    02/28/18 1318  Diet Regular; Regular House  Diet effective now     Question Answer Comment   Diet Type Regular    Regular Regular House    RD to adjust diet per protocol?  Yes        02/28/18 1317          Mobility: WBAT BLE  PT/OT eval and treat    DVT Prophylaxis: Foot pump    Scheduled Meds:  Current Facility-Administered Medications:  Cefazolin  Intravenous x3   acetaminophen 650 mg Oral Q6H   docusate sodium 100 mg Oral BID   enoxaparin 40 mg Subcutaneous Daily   metoprolol succinate 50 mg Oral QPM   pantoprazole 40 mg Oral BID   PARoxetine 10 mg Oral QPM   pravastatin 10 mg Oral QPM   senna 1 tablet Oral Daily   traMADol 50 mg Oral Q6H [facility]     Continuous Infusions:  lactated ringers 125 mL/hr Last Rate: Stopped (03/01/18 0815)     PRN Meds: albuterol    ALPRAZolam    calcium carbonate    morphine injection iv x2    ondansetron    oxyCODONE po x3

## 2018-03-01 NOTE — PROGRESS NOTES
Internal Medicine Progress Note  Patient: Murtaza Aparicio  Age/sex: 60 y o  female  Medical Record #: [id number]      ASSESSMENT/PLAN:  Murtaza Aparicio is seen and examined and mangement for following issues:    1  Right TKA: pain control per primary team  Encourage IS hourly and bowel regimen to help prevent narcotic induced pain medication  Hgb 10 0 today postoperatively - follow CBC  2  Hypertension: continue Toprol XL 50mg daily  Hold for sbp < 130  Resume Losartan on discharge  Toprol held this AM  Patient denies lightheadedness  3  Hyperlipidemia: continue statin therapy  4  GERD: continue Protonix 40mg daily  5  Anxiety/depression: continue Paxil  6  DVT prophylaxis: continue Lovenox 40mg daily      Subjective: Patient seen and examined  Pain controlled this AM  + nausea and vomiting overnight however now improved   Tolerating breakfast this AM  Villafuerte out and voiding    ROS:   GI: denies abdominal pain, change bowel habits or reflux symptoms  Neuro: No new neurologic changes  Respiratory: No Cough, SOB  Cardiovascular: No CP, palpitations     Scheduled Meds:    Current Facility-Administered Medications:  acetaminophen 650 mg Oral Q6H Ramón Levels, MD    albuterol 2 puff Inhalation Q6H PRN Ramón Levels, MD    ALPRAZolam 0 25 mg Oral HS PRN Ramón Levels, MD    calcium carbonate 1,000 mg Oral Daily PRN Ramón Levels, MD    docusate sodium 100 mg Oral BID Ramón Levels, MD    enoxaparin 40 mg Subcutaneous Daily Ramón Levels, MD    lactated ringers 125 mL/hr Intravenous Continuous Ramón Levels, MD Last Rate: Stopped (03/01/18 0815)   lactated ringers 125 mL/hr Intravenous Once Ramón Levels, MD    metoprolol succinate 50 mg Oral QPM Patricia Tolentino PA-C    morphine injection 2 mg Intravenous Q2H PRN Ramón Levels, MD    ondansetron 4 mg Intravenous Q6H PRN Jakob Zhang MD    oxyCODONE 10 mg Oral Q4H PRN Ramón Levels, MD    oxyCODONE 5 mg Oral Q4H PRN Ramón Levels, MD    pantoprazole 40 mg Oral BID Corrinne Monte, MD    PARoxetine 10 mg Oral QPM Corrinne Monte, MD    pravastatin 10 mg Oral QPM Corrinne Monte, MD    senna 1 tablet Oral Daily Corrinne Monte, MD    traMADol 50 mg Oral Q6H Portia Thompson MD        Labs:       Results from last 7 days  Lab Units 03/01/18  0546   WBC Thousand/uL 10 10   HEMOGLOBIN g/dL 10 0*   HEMATOCRIT % 30 1*   PLATELETS Thousands/uL 195       Results from last 7 days  Lab Units 03/01/18  0546   SODIUM mmol/L 134*   POTASSIUM mmol/L 3 6   CHLORIDE mmol/L 101   CO2 mmol/L 28   BUN mg/dL 15   CREATININE mg/dL 0 77   GLUCOSE RANDOM mg/dL 139   CALCIUM mg/dL 8 0*                Glucose (mg/dL)   Date Value   03/01/2018 139       Labs reviewed    Physical Examination:  Vitals:   Vitals:    02/28/18 1630 02/28/18 1914 02/28/18 2342 03/01/18 0740   BP: 94/57 111/58 (!) 105/49 (!) 107/48   BP Location: Right arm Right arm Right arm Left arm   Pulse: 60 62 66 65   Resp: 16 16 16 16   Temp: (!) 97 4 °F (36 3 °C) 97 5 °F (36 4 °C) 98 6 °F (37 °C) 99 °F (37 2 °C)   TempSrc: Oral Oral Oral Oral   SpO2: 97% 96% 96% 94%   Weight:       Height:           GEN: NAD, nontoxic  HEENT: NC/AT, EOMI  RESP: CTAB, no R/R/W  CV: +S1 S2, regular rate, no rubs  ABD: soft, NT, ND, normal BS   : no davis  EXT: no LE edema  Skin: no rashes  Neuro: AAO x 3      [x ] Total time spent: 30 Mins and greater than 50% of this time was spent counseling/coordinating care        Ari Marinelli PA-C  Internal Medicine

## 2018-03-01 NOTE — OCCUPATIONAL THERAPY NOTE
[address] Evaluation     Patient Name: Soraya Mortensen  ORUTD'T Date: 3/1/2018  Problem List  Patient Active Problem List   Diagnosis    Primary localized osteoarthritis of right knee    Bilateral knee pain    Chronic GERD    Depression    Dyslipidemia    Hypertension     Past Medical History  Past Medical History:   Diagnosis Date    Anxiety     GERD (gastroesophageal reflux disease)     History of palpitations     Hypertension     Irritable bowel syndrome      Past Surgical History  Past Surgical History:   Procedure Laterality Date    CARDIAC CATHETERIZATION       SECTION      ESOPHAGOGASTRODUODENOSCOPY N/A 2016    Procedure: ESOPHAGOGASTRODUODENOSCOPY (EGD); Surgeon: Vanda Beltre MD;  Location: [facility]; Service:     KNEE ARTHROSCOPY      NV TOTAL KNEE ARTHROPLASTY Right 2018    Procedure: ARTHROPLASTY KNEE TOTAL;  Surgeon: John Hicks MD;  Location:  MAIN OR;  Service: Orthopedics         18 0925   Note Type   Note type Eval/Treat   Restrictions/Precautions   Weight Bearing Precautions Per Order Yes   RLE Weight Bearing Per Order WBAT   Other Precautions Fall Risk;Pain   Pain Assessment   Pain Assessment 0-10   Pain Score 4   Pain Type Acute pain;Surgical pain   Pain Location Knee; Other (Comment)  (Heel)   Pain Orientation Right   Hospital Pain Intervention(s) Cold applied;Repositioned; Ambulation/increased activity; Emotional support   Home Living   Type of [address] Two level  (no KANDI)   Bathroom Shower/Tub Tub/shower unit   Bathroom Toilet Standard   Additional Comments pt reports no DME   Prior Function   Level of [place] Independent with ADLs and functional mobility   Lives With Family  (gdtr who will be home and gdtr's SO)   Receives Help From Family   ADL Assistance Independent   IADLs Independent   Falls in the last 6 months 0   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[employer])   Lifestyle   Autonomy pta pt reports I in ADLs/IADLs/functional mobility   Reciprocal Relationships pt lives w/ gdtr   Service to Others works part time for [employer] scheduling   Intrinsic Gratification pt enjoys reading   Psychosocial   Psychosocial (WDL) WDL   Subjective   Subjective "I'm worried i'm going to do something wrong that will hurt the knee"   ADL   Where Assessed Chair   Eating Assistance 5  Supervision/Setup   Grooming Assistance 5  Supervision/Setup   UB Bathing Assistance 5  Supervision/Setup    [address] 5  Supervision/Setup   LB Dressing Assistance 4  [address] RLE into pants;Pull up over hips; Fasteners   Toileting Assistance  4  Minimal Assistance   Toileting Deficit Grab bar use;Clothing management up   Functional Assistance 4  Minimal Assistance   Bed Mobility   Additional Comments pt oob in chair upon arrival   Transfers   Sit to Stand 4  Minimal assistance   Additional items Verbal cues; Increased time required;Assist x 1   Stand to Sit 4  Minimal assistance   Additional items Assist x 1; Increased time required;Verbal cues   Toilet transfer 4  Minimal assistance   Additional items Assist x 1; Increased time required   Functional Mobility   Functional Mobility 4  Minimal assistance   Additional Comments CG   Additional items Rolling walker   Balance   Static Sitting Fair +   Dynamic Sitting Fair +   Static Standing Fair   Dynamic Standing Fair -   Ambulatory Fair -   Activity Tolerance   Activity Tolerance Patient tolerated treatment well   Nurse Made Aware okay to see per rn   RUE Assessment   RUE Assessment WFL   LUE Assessment   LUE Assessment WFL   Hand Function   Gross Motor Coordination Functional   Fine Motor Coordination Functional   Cognition   Overall Cognitive Status WFL   Arousal/Participation Alert; Cooperative   Attention Within functional limits   Orientation Level Oriented X4;Oriented to person;Oriented to place;Oriented to time;Oriented to situation   Memory Within functional limits   Following Commands Follows multistep commands with increased time or repetition   Assessment   Limitation Decreased ADL status; Decreased Safe judgement during ADL;Decreased endurance;Decreased self-care trans;Decreased high-level ADLs   Prognosis Good   Assessment Pt is 59 yo F admitted to [facility] for elective R TKA on 2/28/18  Pt is WBAT RLE per orders  Pt's PMH significant for anxiety, depression, GERD, HTN, IBS, and previous surgeries  Pt resides in 2 story home w/ 0 KANDI  PT reports gdtr will be home to help upon d/c  Pt reports no DME at baseline  Currently pt requires Min A for functional mobility, toileting, and LB ADLs  Pt is SBA for UB ADLs, grooming, and eating  Pt is limited 2* decreased activity tolerance, decreased safety awareness, pain, impaired balance, and decreased ADL status  From OT standpoint, anticipate d/c home w/ family support pending medical stability  OT will continue to follow to address the below described goals  Goals   LTG Time Frame 7-10   Plan   Treatment Interventions ADL retraining;Functional transfer training; Endurance training;Patient/family training;Energy conservation; Activityengagement; Compensatory technique education   Goal Expiration Date 03/11/18   OT Frequency 3-5x/wk   Recommendation   OT Discharge Recommendation Home with family support   Equipment Recommended Bedside commode   Barthel Index   Feeding 10   Bathing 0   Grooming Score 5   Dressing Score 5   Bladder Score 10   Bowels Score 10   Toilet Use Score 5   Transfers (Bed/Chair) Score 10   Mobility (Level Surface) Score 10   Stairs Score 0   Barthel Index Score 65   Modified Dario Scale   Modified Florence Scale 3     Goals:  1 ) Pt will increase activity tolerance to 40 minutes in order to participate in full ADLs    2 ) Pt will be Mod I for functional mobility w/ appropriate AD    3 ) Pt will be Mod I for toileting w/ appropriate AD    4 ) Pt will be SBA for LB dressing   5 ) Pt will be I in UB dressing/bathing   6 ) Pt will demonstrate safe us of AD w/ G carryover during activities       Leatha Chávez OTS

## 2018-03-01 NOTE — PLAN OF CARE
Problem: PAIN - ADULT  Goal: Verbalizes/displays adequate comfort level or baseline comfort level  Interventions:  - Encourage patient to monitor pain and request assistance  - Assess pain using appropriate pain scale  - Administer analgesics based on type and severity of pain and evaluate response  - Implement non-pharmacological measures as appropriate and evaluate response  - Consider cultural and social influences on pain and pain management  - Notify physician/advanced practitioner if interventions unsuccessful or patient reports new pain   Outcome: Progressing      Problem: INFECTION - ADULT  Goal: Absence or prevention of progression during hospitalization  INTERVENTIONS:  - Assess and monitor for signs and symptoms of infection  - Monitor lab/diagnostic results  - Monitor all insertion sites, i e  indwelling lines, tubes, and drains  - Monitor endotracheal (as able) and nasal secretions for changes in amount and color  - [place] appropriate cooling/warming therapies per order  - Administer medications as ordered  - Instruct and encourage patient and family to use good hand hygiene technique  - Identify and instruct in appropriate isolation precautions for identified infection/condition   Outcome: Progressing    Goal: Absence of fever/infection during neutropenic period  INTERVENTIONS:  - Monitor WBC  - Implement neutropenic guidelines   Outcome: Progressing      Problem: SAFETY ADULT  Goal: Patient will remain free of falls  INTERVENTIONS:  - Assess patient frequently for physical needs  -  Identify cognitive and physical deficits and behaviors that affect risk of falls    -  [place] fall precautions as indicated by assessment   - Educate patient/family on patient safety including physical limitations  - Instruct patient to call for assistance with activity based on assessment  - Modify environment to reduce risk of injury  - Consider OT/PT consult to assist with strengthening/mobility   Outcome: Progressing    Goal: Maintain or return to baseline ADL function  INTERVENTIONS:  -  Assess patient's ability to carry out ADLs; assess patient's baseline for ADL function and identify physical deficits which impact ability to perform ADLs (bathing, care of mouth/teeth, toileting, grooming, dressing, etc )  - Assess/evaluate cause of self-care deficits   - Assess range of motion  - Assess patient's mobility; develop plan if impaired  - Assess patient's need for assistive devices and provide as appropriate  - Encourage maximum independence but intervene and supervise when necessary  ¯ Involve family in performance of ADLs  ¯ Assess for home care needs following discharge   ¯ Request OT consult to assist with ADL evaluation and planning for discharge  ¯ Provide patient education as appropriate   Outcome: Progressing    Goal: Maintain or return mobility status to optimal level  INTERVENTIONS:  - Assess patient's baseline mobility status (ambulation, transfers, stairs, etc )    - Identify cognitive and physical deficits and behaviors that affect mobility  - Identify mobility aids required to assist with transfers and/or ambulation (gait belt, sit-to-stand, lift, walker, cane, etc )  - [place] fall precautions as indicated by assessment  - Record patient progress and toleration of activity level on Mobility SBAR; progress patient to next Phase/Stage  - Instruct patient to call for assistance with activity based on assessment  - Request Rehabilitation consult to assist with strengthening/weightbearing, etc    Outcome: Progressing      Problem: DISCHARGE PLANNING  Goal: Discharge to home or other facility with appropriate resources  INTERVENTIONS:  - Identify barriers to discharge w/patient and caregiver  - Arrange for needed discharge resources and transportation as appropriate  - Identify discharge learning needs (meds, wound care, etc )  - Arrange for interpretive services to assist at discharge as needed  - Refer to Case Management Department for coordinating discharge planning if the patient needs post-hospital services based on physician/advanced practitioner order or complex needs related to functional status, cognitive ability, or social support system   Outcome: Progressing      Problem: Knowledge Deficit  Goal: Patient/family/caregiver demonstrates understanding of disease process, treatment plan, medications, and discharge instructions  Complete learning assessment and assess knowledge base    Interventions:  - Provide teaching at level of understanding  - Provide teaching via preferred learning methods   Outcome: Progressing

## 2018-03-01 NOTE — PROGRESS NOTES
Saw pt at bedside as post-op follow up  Pt was sitting in reclining chair, I assisted pt with repositioning in recliner  I applied BL foot pumps to pt  Pt currently c/o 8/10 SS pain, she reports she just had her pain meds but they "didn't kick in yet"  Pt denying CP/SOB/dizziness/N  BLE cap refill < 3 seconds, BLE post  tib pulses strong and present  Dr Sheridan Uribe arrived at bedside  Pt denies having questions/concerns at this time

## 2018-03-01 NOTE — PLAN OF CARE
Problem: OCCUPATIONAL THERAPY ADULT  Goal: Performs self-care activities at highest level of function for planned discharge setting  See evaluation for individualized goals  Treatment Interventions: ADL retraining, Functional transfer training, Endurance training, Patient/family training, Energy conservation, Activityengagement, Compensatory technique education  Equipment Recommended: Bedside commode       See flowsheet documentation for full assessment, interventions and recommendations  Limitation: Decreased ADL status, Decreased Safe judgement during ADL, Decreased endurance, Decreased self-care trans, Decreased high-level ADLs  Prognosis: Good  Assessment: Pt is 61 yo F admitted to [facility] for elective R TKA on 2/28/18  Pt is WBAT RLE per orders  Pt's PMH significant for anxiety, depression, GERD, HTN, IBS, and previous surgeries  Pt resides in 2 story home w/ 0 KANDI  PT reports gdtr will be home to help upon d/c  Pt reports no DME at baseline  Currently pt requires Min A for functional mobility, toileting, and LB ADLs  Pt is SBA for UB ADLs, grooming, and eating  Pt is limited 2* decreased activity tolerance, decreased safety awareness, pain, impaired balance, and decreased ADL status  From OT standpoint, anticipate d/c home w/ family support pending medical stability  OT will continue to follow to address the below described goals        OT Discharge Recommendation: Home with family support

## 2018-03-01 NOTE — PHYSICAL THERAPY NOTE
Physical Therapy Progress Note     03/01/18 5190   Pain Assessment   Pain Assessment 0-10   Pain Score 8   Pain Type Surgical pain   Pain Location Knee   Pain Orientation Right   Hospital Pain Intervention(s) Ambulation/increased activity;Cold applied   Response to Interventions tolerated    Restrictions/Precautions   Weight Bearing Precautions Per Order Yes   RLE Weight Bearing Per Order WBAT   Other Precautions WBS;Pain   General   Chart Reviewed Yes   Response to Previous Treatment Patient with no complaints from previous session  Family/Caregiver Present Yes   Cognition   Overall Cognitive Status WFL   Arousal/Participation Alert; Cooperative   Orientation Level Oriented X4   Following Commands Follows all commands and directions without difficulty   Subjective   Subjective has  op knee pain    Bed Mobility   Supine to Sit 4  Minimal assistance   Additional items Assist x 1;Bedrails; Increased time required;Verbal cues;LE management   Sit to Supine 4  Minimal assistance   Additional items Assist x 1; Increased time required;Verbal cues;LE management; Bedrails   Transfers   Sit to Stand 6  Modified independent   Additional items Increased time required   Stand to Sit 6  Modified independent   Additional items Increased time required   Ambulation/Elevation   Gait pattern Antalgic;Narrow SREE; Decreased foot clearance;Decreased R stance; Step to;Short stride; Excessively slow   Gait Assistance 5  Supervision   Additional items Assist x 1;Verbal cues; Tactile cues   Assistive Device Rolling walker   Distance 80ft  50ft   Stair Management Assistance 5  Supervision   Additional items Verbal cues   Stair Management Technique Two rails; Step to pattern   Number of Stairs 5   Balance   Static Sitting Fair +   Static Standing Fair   Ambulatory Fair -   Endurance Deficit   Endurance Deficit Yes   Endurance Deficit Description pain   Activity Tolerance   Activity Tolerance Patient limited by pain   Nurse [address] r[address] Exercises   TKR Supine;15 reps;AROM; Right   Assessment   Prognosis Good   Problem List Decreased strength;Decreased range of motion;Decreased endurance; Impaired balance;Decreased mobility;Pain   Assessment pt  continues  to  progress well  c mobility      she does need assist  for  leg management  during bed mob     once up pt did amb  c superv   she is able to comlpete household  didtance   gait is very slow c step to pattern  but  steady using  rw      she di negotiate stairs  c superv and reminders for sequencing        will cont to see pt   for   home dc    Goals   Patient Goals go home    STG Expiration Date 03/10/18   Treatment Day 2   Plan   Treatment/Interventions Functional transfer training;LE strengthening/ROM; Elevations; Patient/family training;Bed mobility;Gait training;Spoke to nursing   Progress Improving as expected   PT Frequency 7x/wk; Twice a day   Recommendation   Recommendation Home with family support; Outpatient PT   Equipment Recommended Serjio Ambriz   PT - OK to Discharge (when med ready )     Santy Alegria, PTA

## 2018-03-01 NOTE — PROGRESS NOTES
Orthopedics   Dione Parr 60 y o  female MRN: [id number]  Unit/Bed#: CW3 349-01      Subjective:  60 y  o female post operative day 1 right total knee arthroplasty  Pt doing well  Pain controlled      Labs:    0  Lab Value Date/Time   HCT 30 1 (L) 03/01/2018 0546   HCT 44 7 02/12/2018 1105   HGB 10 0 (L) 03/01/2018 0546   HGB 14 3 02/12/2018 1105   INR 0 94 02/12/2018 1105   WBC 10 10 03/01/2018 0546   WBC 8 49 02/12/2018 1105       Meds:    Current Facility-Administered Medications:     acetaminophen (TYLENOL) tablet 650 mg, 650 mg, Oral, Q6H, Mohamud Gracia MD, 650 mg at 03/01/18 0507    albuterol (PROVENTIL HFA,VENTOLIN HFA) inhaler 2 puff, 2 puff, Inhalation, Q6H PRN, Mohamud Gracia MD    ALPRAZolam Dorthey Halim) tablet 0 25 mg, 0 25 mg, Oral, HS PRN, Mohamud Gracia MD    calcium carbonate (TUMS) chewable tablet 1,000 mg, 1,000 mg, Oral, Daily PRN, Mohamud Gracia MD    docusate sodium (COLACE) capsule 100 mg, 100 mg, Oral, BID, Mohamud Gracia MD    enoxaparin (LOVENOX) subcutaneous injection 40 mg, 40 mg, Subcutaneous, Daily, Mohamud Gracia MD    lactated ringers infusion, 125 mL/hr, Intravenous, Continuous, Mohamud Gracia MD, Last Rate: 125 mL/hr at 03/01/18 0156, 125 mL/hr at 03/01/18 0156    lactated ringers infusion, 125 mL/hr, Intravenous, Once, Mohamud Gracia MD    metoprolol succinate (TOPROL-XL) 24 hr tablet 50 mg, 50 mg, Oral, QPM, Dunia Pimentel PA-C    morphine injection 2 mg, 2 mg, Intravenous, Q2H PRN, Mohamud Gracia MD, 2 mg at 03/01/18 0151    ondansetron (ZOFRAN) injection 4 mg, 4 mg, Intravenous, Q6H PRN, Wilton Partida MD, 4 mg at 03/01/18 0507    oxyCODONE (ROXICODONE) immediate release tablet 10 mg, 10 mg, Oral, Q4H PRN, Mohamud Gracia MD, 10 mg at 02/28/18 1776    oxyCODONE (ROXICODONE) IR tablet 5 mg, 5 mg, Oral, Q4H PRN, Mohamud Gracia MD    pantoprazole (PROTONIX) EC tablet 40 mg, 40 mg, Oral, BID, Mohamud Gracia MD    PARoxetine (PAXIL) tablet 10 mg, 10 mg, Oral, QPM, Mohamud Gracia MD    pravastatin (PRAVACHOL) tablet 10 mg, 10 mg, Oral, QPM, Mohamud Gracia MD    senna (SENOKOT) tablet 8 6 mg, 1 tablet, Oral, Daily, Mohamud Gracia MD    traMADol (ULTRAM) tablet 50 mg, 50 mg, Oral, Q6H [address], Mohamud Gracia MD, 50 mg at 03/01/18 0507    Blood Culture:   No results found for: BLOODCX    Wound Culture:   No results found for: WOUNDCULT    Ins and Outs:  I/O last 24 hours: In: 8558 [P O :717; I V :2600; IV Piggyback:250]  Out: 2000 [Urine:1400; Drains:600]          Physical:  Vitals:    02/28/18 2342   BP: (!) 105/49   Pulse: 66   Resp: 16   Temp: 98 6 °F (37 °C)   SpO2: 96%     right lower extremity:  · Dressings C/D/I  · Toes warm and well perfused  · HVx1    _*_*_*_*_*_*_*_*_*_*_*_*_*_*_*_*_*_*_*_*_*_*_*_*_*_*_*_*_*_*_*_*_*_*_*_*_*_*_*_*_*    Assessment: 60 y  o female post operative day 1 right total knee arthroplasty   Doing well    Plan:  · Weight Bearing as tolerated  · Up and out of bed  · DVT prophylaxis  · Analgesics  · PT/OT  · Patient noted to have acute blood loss anemia due to a drop in Hbg of > 2 0g from preop levels, will monitor vital signs and resuscitate with IV fluids as needed    Bipin Dalton MD

## 2018-03-01 NOTE — PHYSICAL THERAPY NOTE
PT TREATMENT     03/01/18 1227   Pain Assessment   Pain Assessment 0-10   Pain Score 4   Pain Type Acute pain   Pain Location Knee   Pain Orientation Right   Hospital Pain Intervention(s) Cold applied;Repositioned; Ambulation/increased activity   Restrictions/Precautions   Weight Bearing Precautions Per Order Yes   RLE Weight Bearing Per Order WBAT   Other Precautions WBS; Fall Risk;Pain   General   Chart Reviewed Yes   Additional Pertinent History POD#1 R TKA   Response to Previous Treatment Patient with no complaints from previous session  Family/Caregiver Present No   Cognition   Overall Cognitive Status WFL   Arousal/Participation Alert; Cooperative   Attention Within functional limits   Orientation Level Oriented X4   Memory Within functional limits   Following Commands Follows all commands and directions without difficulty   Comments Pt friendly and cooperative throughout session  Pt able to converse appropriately  Pt required encouragement throughout session as pt felt she "was not doing great"   Subjective   Subjective Pt agreeable to participate   Bed Mobility   Additional Comments Pt OOB in chair upon arrival   Transfers   Sit to Stand 6  Modified independent   Additional items Increased time required   Stand to Sit 6  Modified independent   Additional items Increased time required   Ambulation/Elevation   Gait pattern Improper Weight shift; Antalgic;Decreased foot clearance;Decreased R stance; Short stride; Step to;Excessively slow   Gait Assistance 5  Supervision   Additional items Assist x 1   Assistive Device Rolling walker   Distance 50' x 2   Stair Management Assistance 5  Supervision   Additional items Assist x 1; Increased time required   Stair Management Technique Two rails; Step to pattern; Foreward   Number of Stairs 6   Balance   Static Sitting Fair   Dynamic Sitting Fair   Static Standing Fair -   Ambulatory Fair -   Endurance Deficit   Endurance Deficit Yes   Endurance Deficit Description pain and fatigue; Pt expressed concerns of recently low BP  BP taken s/p ambulation in long sit position in chair reading 96/48  Spoke with RN after session about BP and patients concerns   Activity Tolerance   Activity Tolerance Patient limited by fatigue;Patient limited by pain   Nurse Made Aware Appropriate to see per RN   Exercises   Quad Sets Sitting;20 reps;AROM; Right  (long sit in chair; 2x10)   Ankle Pumps Sitting;20 reps;AROM; Bilateral  (long sit in chair; 2x10)   Neuro re-ed Pt educated on proper sequencing of ambulation with Rw  Pt able to ambulate 48' with RW and supervision assist  Rest break given to provide education on safe stair negotiation  Pt able to ascend and descend 3 steps x 2 with B rails and supervision assist  Pt stated slight nausea s/p second stair trial  Symptoms subsided and pt able to ambulate additional 48' with RW and supervision assist  Pt educated on LE position with pillow including to avoid horizontal pillow position  Pt educated on importance of periods of passive knee flexion as well as periods of passive knee extension  Pt educated on appropriate HEP including heel pumps, quads sets, glute sets, and ambulation  Pt expressed concerns of bath transfers  Pt educated on possibilities including safe transfers, as well as shower bench vs shower chair  Assessment   Prognosis Good   Problem List Decreased strength;Decreased range of motion;Decreased endurance; Impaired balance;Decreased mobility;Orthopedic restrictions;Pain   Assessment Pt seen for PT treatment session POD #1 R TKA  Pt OOB in chair upon arrival and agreeable to participate  PT treatment session focused on progressing functional mobility and education, see above for more detailed description of education provided  Pt able to perform transfers at Mod (I) level  Pt able to ambulate and perform stairs at supervision level with Rw  Pt required encouragement throughout session as patient felt she was not moving that great   Pt reassured of her progress  Current problem list includes pain, decreased strength and ROM of R Le, decreased functional mobility and endurance, impaired balance, and decreased activity tolerance  PT to continue to follow pt throughout course of stay to address above limitations  Current recommendation is home with family support and HHPT as patient's OPPT appointment is not scheduled until late next week  Goals   Patient Goals to go home   STG Expiration Date 03/10/18   Treatment Day 1   Plan   Treatment/Interventions Functional transfer training;LE strengthening/ROM; Therapeutic exercise; Endurance training;Patient/family training;Equipment eval/education; Bed mobility;Gait training;Spoke to nursing   Progress Improving as expected   PT Frequency 7x/wk; Twice a day   Recommendation   Recommendation Home with family support;Home PT   Equipment Recommended Walker   PT - OK to Discharge Yes  (when medically appropriate)   Additional Comments Pt left OOB in chair with all needs within reach  Ice donned and foot pumps activated   Family in room at end of session     Mehnaz Murillo, SPT

## 2018-03-01 NOTE — CONSULTS
Progress Note - Anesthesia Acute Pain Management    Johana Wills 60 y o  female MRN: [id number]  Unit/Bed#: CW3 349-01 Encounter: [id number]      SURGERY DATE: 2/28/2018  Post-Op Diagnosis Codes:     * Primary localized osteoarthritis of right knee [M17 11]    Assessment:   60 y o  female status post Procedure(s):  ARTHROPLASTY KNEE TOTAL POD# 1  Well controlled acute post operative pain status post right Adductor Canal Block    Principal Problem:    Primary localized osteoarthritis of right knee    Plan:   1  Patient doing well status post block  Continue with MLJ pain protocol    APS sign off  Thank you for the Consult  Please call  ( btwn [phone]) with any further questions    Pain Course:  24 hour history:  Patient states that her pain is well controlled  The block wore off this morning and she had increased pain but now controlled with normal pain regimen  No other complaints      Meds/Allergies     Inpatient Medications:  Scheduled Meds:   Current Facility-Administered Medications:  acetaminophen 650 mg Oral Q6H Marta Vang MD    albuterol 2 puff Inhalation Q6H PRN Marta Vang MD    ALPRAZolam 0 25 mg Oral HS PRN Marta Vang MD    calcium carbonate 1,000 mg Oral Daily PRN Marta Vang MD    docusate sodium 100 mg Oral BID Marta Vang MD    enoxaparin 40 mg Subcutaneous Daily Marta Vang MD    lactated ringers 125 mL/hr Intravenous Continuous Marta Vang MD Last Rate: Stopped (03/01/18 0815)   lactated ringers 125 mL/hr Intravenous Once Marta Vang MD    metoprolol succinate 50 mg Oral QPM Mina Garcia PA-C    morphine injection 2 mg Intravenous Q2H PRN Marta Vang MD    ondansetron 4 mg Intravenous Q6H PRN Leah Garrett MD    oxyCODONE 10 mg Oral Q4H PRN Marta Vang MD    oxyCODONE 5 mg Oral Q4H PRN Marta Vang MD    pantoprazole 40 mg Oral BID Marta aVng MD    PARoxetine 10 mg Oral QPM Marta Vang MD    pravastatin 10 mg Oral QPM Oscar Jing De La Cruz MD    senna 1 tablet Oral Daily Lorne Leon MD    traMADol 50 mg Oral Q6H Bc Cheng MD      Continuous Infusions:   lactated ringers 125 mL/hr Last Rate: Stopped (03/01/18 0815)     PRN Meds:    albuterol 2 puff Q6H PRN   ALPRAZolam 0 25 mg HS PRN   calcium carbonate 1,000 mg Daily PRN   morphine injection 2 mg Q2H PRN   ondansetron 4 mg Q6H PRN   oxyCODONE 10 mg Q4H PRN   oxyCODONE 5 mg Q4H PRN         Allergies   Allergen Reactions    Bactrim [Sulfamethoxazole-Trimethoprim] Hives, Itching and Rash    Prednisone Hives    Sulfa Antibiotics Itching and Rash       Objective     Physical Exam: BP 96/50 (BP Location: Left arm)   Pulse 74   Temp 98 2 °F (36 8 °C) (Oral)   Resp 16   Ht 5' (1 524 m)   Wt 70 8 kg (156 lb)   SpO2 93%   BMI 30 47 kg/m²   Gen: Well appearing and well nourished, NAD  Skin: Warm, Dry   HEENT:  PERRLA, EOMI  Pul: non labored resp effort  Ext:  No cyanosis or edema  Neuro: AAOx3; CN II-XII grossly intact; 5/5 BLUE, 5/5 BLLE; Sensation intact grossly   Psych:  Pleasant and conversant    SIGNATURE: Jama Collins MD  DATE: March 1, 2018  TIME: 11:20 AM

## 2018-03-01 NOTE — PLAN OF CARE
Problem: PHYSICAL THERAPY ADULT  Goal: Performs mobility at highest level of function for planned discharge setting  See evaluation for individualized goals  Treatment/Interventions: Functional transfer training, LE strengthening/ROM, Elevations, Therapeutic exercise, Endurance training, Bed mobility, Gait training, Spoke to nursing, Family  Equipment Recommended: Sunni Socks       See flowsheet documentation for full assessment, interventions and recommendations  Outcome: Progressing  Prognosis: Good  Problem List: Decreased strength, Decreased range of motion, Decreased endurance, Impaired balance, Decreased mobility, Orthopedic restrictions, Pain  Assessment: Pt seen for PT treatment session POD #1 R TKA  Pt OOB in chair upon arrival and agreeable to participate  PT treatment session focused on progressing functional mobility and education, see above for more detailed description of education provided  Pt able to perform transfers at Mod (I) level  Pt able to ambulate and perform stairs at supervision level with Rw  Pt required encouragement throughout session as patient felt she was not moving that great  Pt reassured of her progress  Current problem list includes pain, decreased strength and ROM of R Le, decreased functional mobility and endurance, impaired balance, and decreased activity tolerance  PT to continue to follow pt throughout course of stay to address above limitations  Current recommendation is home with family support and HHPT as patient's OPPT appointment is not scheduled until late next week  Barriers to Discharge:  (steps in the house)     Recommendation: Home with family support, Home PT     PT - OK to Discharge: Yes (when medically appropriate)    See flowsheet documentation for full assessment

## 2018-03-01 NOTE — PLAN OF CARE
Problem: PHYSICAL THERAPY ADULT  Goal: Performs mobility at highest level of function for planned discharge setting  See evaluation for individualized goals  Treatment/Interventions: Functional transfer training, LE strengthening/ROM, Elevations, Therapeutic exercise, Endurance training, Bed mobility, Gait training, Spoke to nursing, Family  Equipment Recommended: Caesar Murguia       See flowsheet documentation for full assessment, interventions and recommendations  Outcome: Progressing  Prognosis: Good  Problem List: Decreased strength, Decreased range of motion, Decreased endurance, Impaired balance, Decreased mobility, Pain  Assessment: pt  continues  to  progress well  c mobility      she does need assist  for  leg management  during bed mob     once up pt did amb  c superv   she is able to comlpete household  didtance   gait is very slow c step to pattern  but  steady using  rw      she di negotiate stairs  c superv and reminders for sequencing        will cont to see pt   for   home dc   Barriers to Discharge:  (steps in the house)     Recommendation: Home with family support, Outpatient PT     PT - OK to Discharge:  (when med ready )    See flowsheet documentation for full assessment

## 2018-03-02 PROBLEM — Z96.651 S/P TOTAL KNEE ARTHROPLASTY, RIGHT: Status: ACTIVE | Noted: 2018-03-02

## 2018-03-02 PROBLEM — M17.11 PRIMARY LOCALIZED OSTEOARTHRITIS OF RIGHT KNEE: Status: RESOLVED | Noted: 2017-11-14 | Resolved: 2018-03-02

## 2018-03-02 LAB
ANION GAP SERPL CALCULATED.3IONS-SCNC: 5 MMOL/L (ref 4–13)
BUN SERPL-MCNC: 10 MG/DL (ref 5–25)
CALCIUM SERPL-MCNC: 8.3 MG/DL (ref 8.3–10.1)
CHLORIDE SERPL-SCNC: 103 MMOL/L (ref 100–108)
CO2 SERPL-SCNC: 31 MMOL/L (ref 21–32)
CREAT SERPL-MCNC: 0.62 MG/DL (ref 0.6–1.3)
ERYTHROCYTE [DISTWIDTH] IN BLOOD BY AUTOMATED COUNT: 14 % (ref 11.6–15.1)
GFR SERPL CREATININE-BSD FRML MDRD: 96 ML/MIN/1.73SQ M
GLUCOSE SERPL-MCNC: 110 MG/DL (ref 65–140)
HCT VFR BLD AUTO: 29.6 % (ref 34.8–46.1)
HGB BLD-MCNC: 9.7 G/DL (ref 11.5–15.4)
MCH RBC QN AUTO: 28.6 PG (ref 26.8–34.3)
MCHC RBC AUTO-ENTMCNC: 32.8 G/DL (ref 31.4–37.4)
MCV RBC AUTO: 87 FL (ref 82–98)
PLATELET # BLD AUTO: 186 THOUSANDS/UL (ref 149–390)
PMV BLD AUTO: 10.9 FL (ref 8.9–12.7)
POTASSIUM SERPL-SCNC: 3.5 MMOL/L (ref 3.5–5.3)
RBC # BLD AUTO: 3.39 MILLION/UL (ref 3.81–5.12)
SODIUM SERPL-SCNC: 139 MMOL/L (ref 136–145)
WBC # BLD AUTO: 11.18 THOUSAND/UL (ref 4.31–10.16)

## 2018-03-02 PROCEDURE — 97530 THERAPEUTIC ACTIVITIES: CPT

## 2018-03-02 PROCEDURE — 97116 GAIT TRAINING THERAPY: CPT

## 2018-03-02 PROCEDURE — 99024 POSTOP FOLLOW-UP VISIT: CPT | Performed by: ORTHOPAEDIC SURGERY

## 2018-03-02 PROCEDURE — 80048 BASIC METABOLIC PNL TOTAL CA: CPT | Performed by: ORTHOPAEDIC SURGERY

## 2018-03-02 PROCEDURE — 85027 COMPLETE CBC AUTOMATED: CPT | Performed by: ORTHOPAEDIC SURGERY

## 2018-03-02 RX ORDER — DOCUSATE SODIUM 100 MG/1
100 CAPSULE, LIQUID FILLED ORAL 2 TIMES DAILY
Qty: 20 CAPSULE | Refills: 0 | Status: SHIPPED | OUTPATIENT
Start: 2018-03-02 | End: 2018-05-18

## 2018-03-02 RX ADMIN — PANTOPRAZOLE SODIUM 40 MG: 40 TABLET, DELAYED RELEASE ORAL at 09:31

## 2018-03-02 RX ADMIN — SENNOSIDES 8.6 MG: 8.6 TABLET, FILM COATED ORAL at 09:31

## 2018-03-02 RX ADMIN — ENOXAPARIN SODIUM 40 MG: 40 INJECTION SUBCUTANEOUS at 09:30

## 2018-03-02 RX ADMIN — ONDANSETRON 4 MG: 2 INJECTION INTRAMUSCULAR; INTRAVENOUS at 05:48

## 2018-03-02 RX ADMIN — TRAMADOL HYDROCHLORIDE 50 MG: 50 TABLET ORAL at 05:48

## 2018-03-02 RX ADMIN — DOCUSATE SODIUM 100 MG: 100 CAPSULE, LIQUID FILLED ORAL at 09:31

## 2018-03-02 RX ADMIN — OXYCODONE HYDROCHLORIDE 10 MG: 10 TABLET ORAL at 09:31

## 2018-03-02 RX ADMIN — ACETAMINOPHEN 650 MG: 325 TABLET ORAL at 05:47

## 2018-03-02 NOTE — PLAN OF CARE
Problem: PHYSICAL THERAPY ADULT  Goal: Performs mobility at highest level of function for planned discharge setting  See evaluation for individualized goals  Treatment/Interventions: Functional transfer training, LE strengthening/ROM, Elevations, Therapeutic exercise, Endurance training, Bed mobility, Gait training, Spoke to nursing, Family  Equipment Recommended: Obie Castleman       See flowsheet documentation for full assessment, interventions and recommendations  Outcome: Adequate for Discharge  Prognosis: Good  Problem List: Decreased strength, Decreased range of motion, Decreased mobility, Pain  Assessment: pt is able to  complete allphases of  mobility   mod indep       she did amb 80 ft x2 using  rw     gt is  antalgic c short  stride and step to pattern   but steady   she did negotiate stairs c cane and rail   pt is appropriate  for home dc c fam  supp  Barriers to Discharge: None     Recommendation: Outpatient PT, Home with family support     PT - OK to Discharge: Yes    See flowsheet documentation for full assessment

## 2018-03-02 NOTE — PHYSICAL THERAPY NOTE
Physical Therapy Progress Note     03/02/18 0935   Pain Assessment   Pain Assessment 0-10   Pain Score 5   Pain Type Surgical pain   Pain Location Knee   Pain Orientation Right   Hospital Pain Intervention(s) Ambulation/increased activity;Cold applied   Response to Interventions tolerated    Restrictions/Precautions   Weight Bearing Precautions Per Order Yes   RLE Weight Bearing Per Order WBAT   Other Precautions Pain   General   Chart Reviewed Yes   Response to Previous Treatment Patient with no complaints from previous session  Family/Caregiver Present No   Cognition   Overall Cognitive Status WFL   Arousal/Participation Alert; Cooperative   Attention Within functional limits   Orientation Level Oriented X4   Memory Within functional limits   Following Commands Follows all commands and directions without difficulty   Subjective   Subjective has  op knee pain  and mild  nausea  c amb    Bed Mobility   Supine to Sit 6  Modified independent   Additional items Bedrails; Increased time required;LE management  (usiing pant leg to bring leg over )   Transfers   Sit to Stand 6  Modified independent   Stand to Sit 6  Modified independent   Ambulation/Elevation   Gait pattern Antalgic;Narrow SREE; Decreased R stance   Gait Assistance 6  Modified independent   Assistive Device Rolling walker   Distance 80ft x2   Stair Management Assistance 5  Supervision   Additional items Verbal cues   Stair Management Technique One rail L;Step to pattern; With cane   Number of Stairs 5  (x2)   Balance   Static Sitting Normal   Static Standing Fair   Ambulatory Fair -   Endurance Deficit   Endurance Deficit Yes   Endurance Deficit Description pain   Activity Tolerance   Activity Tolerance Patient limited by pain   Nurse Made Aware Subha thapa    Exercises   TKR Supine;20 reps;AROM; Right   Assessment   Prognosis Good   Problem List Decreased strength;Decreased range of motion;Decreased mobility;Pain   Assessment pt is able to  complete allphases of  mobility   mod indep       she did amb 80 ft x2 using  rw     gt is  antalgic c short  stride and step to pattern   but steady   she did negotiate stairs c cane and rail   pt is appropriate  for home dc c fam  supp   Barriers to Discharge None   Goals   Patient Goals go home    STG Expiration Date 03/10/18   Treatment Day 3   Plan   Treatment/Interventions Functional transfer training;LE strengthening/ROM; Elevations; Bed mobility;Gait training;Spoke to nursing   Progress Improving as expected   PT Frequency 7x/wk; Twice a day   Recommendation   Recommendation Outpatient PT; Home with family support   Equipment Recommended Angy Tate   PT - OK to Discharge Yes     Lily Florentino, PTA

## 2018-03-02 NOTE — DISCHARGE SUMMARY
ORTHOPEDICS DISCHARGE SUMMARY  Dione Parr 60 y o  female MRN: [id number]  Unit/Bed#: PG6 349-01    Attending Physician: Booker Ibarra    Admitting diagnosis: Primary localized osteoarthritis of right knee [M17 11]    Discharge diagnosis: Primary localized osteoarthritis of right knee [M17 11]    Date of admission: 2/28/2018    Date of discharge: 03/02/18         Procedure: Right primary total knee arthropalsty    HPI:  This is a 60y o  year old female that presented to the office with signs and symptoms of right knee osteoarthritis  They tried and failed conservative treatment measures and wished to proceed with surgical intervention  The risks, benefits, and complications of the procedure were discussed with the patient and informed consent was obtained  Hospital Course: The patient was admitted to the hospital on 2/28/2018 and underwent an uncomplicated right total knee arthroplasty  They were transferred to the floor after a brief stay in the post-anesthesia care unit  Their pain was well managed with IV and oral pain medications  They began therapy on post operative day #1  Lovenox was also started for DVT prophylaxis post operative day #1  Hemovac drain was removed on POD1  Post operative course was uneventful  On discharge date pt was cleared by PT and the medicine team and determined to be safe for discharge  Daily discussion was had with the patient, nursing staff, orthopaedic team, and family members if present  All questions were answered to the patients satisifaction  0  Lab Value Date/Time   HGB 9 7 (L) 03/02/2018 0521   HGB 10 0 (L) 03/01/2018 0546   HGB 14 3 02/12/2018 1105       Greater than 2 gram drop which qualifies for diagnosis of acute blood loss anemia  Vital signs remained stable and pt was resuscitated with IVF as needed     Discharge Instructions: The patient was discharged weight bearing as tolerated to the right lower extremity  Lovenox will be continued for 28 days  Continue PT/OT  Take pain medications as instructed  Discharge Medications: For the complete list of discharge medications, please refer to the patient's medication reconciliation

## 2018-03-02 NOTE — PROGRESS NOTES
Internal Medicine Progress Note  Patient: Sarah Borden  Age/sex: 60 y o  female  Medical Record #: [id number]      ASSESSMENT/PLAN:  Sarah Borden is seen and examined and mangement for following issues:    1  Right TKA: pain control per primary team  Encourage IS hourly and bowel regimen to help prevent narcotic induced pain medication  Hgb 9 7 today postoperatively - follow CBC  2  Hypertension: continue Toprol XL 50mg daily  Hold for sbp < 130  Resume Losartan on discharge  Toprol held last night  Patient denies lightheadedness  3  Hyperlipidemia: continue statin therapy  4  GERD: continue Protonix 40mg daily  5  Anxiety/depression: continue Paxil  6  DVT prophylaxis: continue Lovenox 40mg daily  7  Leukocytosis:  Mild  Reactive  Afebrile  Pt is medically cleared for DC  Subjective: Patient seen and examined  Pain controlled this AM   She reports that nausea is improving  Appetite is still poor  No BM yet  She denies any other complaints      ROS:   GI: denies abdominal pain, change bowel habits or reflux symptoms  Neuro: No new neurologic changes  Respiratory: No Cough, SOB  Cardiovascular: No CP, palpitations     Scheduled Meds:    Current Facility-Administered Medications:  acetaminophen 650 mg Oral Q6H [place] MD    albuterol 2 puff Inhalation Q6H PRN [place] MD    ALPRAZolam 0 25 mg Oral HS PRN [place] MD    calcium carbonate 1,000 mg Oral Daily PRN [place] MD    docusate sodium 100 mg Oral BID [place] MD    enoxaparin 40 mg Subcutaneous Daily [place] MD    lactated ringers 125 mL/hr Intravenous Continuous [place] MD Last Rate: Stopped (03/01/18 0815)   metoprolol succinate 50 mg Oral QPM Maggy Elmore PA-C    morphine injection 2 mg Intravenous Q2H PRN [place] MD    ondansetron 4 mg Intravenous Q6H PRN Corinne Rosado MD    oxyCODONE 10 mg Oral Q4H PRN [place] MD    oxyCODONE 5 mg Oral Q4H PRN [place] MD pantoprazole 40 mg Oral BID Jimmy Ordaz MD    PARoxetine 10 mg Oral QPM Jimmy Ordaz MD    pravastatin 10 mg Oral QPM Jimmy Ordaz MD    senna 1 tablet Oral Daily Jimmy Ordaz MD    traMADol 50 mg Oral Q6H Zaida Workman MD        Labs:       Results from last 7 days  Lab Units 03/02/18  0521 03/01/18  0546   WBC Thousand/uL 11 18* 10 10   HEMOGLOBIN g/dL 9 7* 10 0*   HEMATOCRIT % 29 6* 30 1*   PLATELETS Thousands/uL 186 195       Results from last 7 days  Lab Units 03/02/18  0521 03/01/18  0546   SODIUM mmol/L 139 134*   POTASSIUM mmol/L 3 5 3 6   CHLORIDE mmol/L 103 101   CO2 mmol/L 31 28   BUN mg/dL 10 15   CREATININE mg/dL 0 62 0 77   GLUCOSE RANDOM mg/dL 110 139   CALCIUM mg/dL 8 3 8 0*                  Glucose (mg/dL)   Date Value   03/02/2018 110   03/01/2018 139       Labs reviewed    Physical Examination:  Vitals:   Vitals:    03/01/18 1109 03/01/18 1506 03/01/18 1956 03/01/18 2324   BP: 96/50 126/59 113/58 114/56   BP Location: Left arm Right arm Right arm Right arm   Pulse: 74 75 79 77   Resp: 16 16 16 16   Temp: 98 2 °F (36 8 °C) 98 8 °F (37 1 °C) 98 2 °F (36 8 °C) 99 2 °F (37 3 °C)   TempSrc: Oral Oral Oral Oral   SpO2: 93% 93% 96% 94%   Weight:       Height:           GEN: NAD, nontoxic  HEENT: NC/AT, EOMI  RESP: CTAB, no R/R/W  CV: +S1 S2, regular rate, no rubs  ABD: soft, NT, ND, normal BS   : no davis  EXT: no LE edema  Skin: no rashes  Neuro: AAO x 3      [ X ] Total time spent: 30 Mins and greater than 50% of this time was spent counseling/coordinating care        Kyle Oliveira PA-C  Internal Medicine

## 2018-03-02 NOTE — SOCIAL WORK
CM met with patient and explained cm role  Pt alert and oriented  Pt reports she lives in 2 story home w/granddaughter with 15 steps tto 2nd floor  Pt denies DME, VNA, and rehab  Pt reports being independent PTA, reports good support at home, she drives and has transport home w/daughter Maty Maynard [phone] for d/c  Pts pharmacy is [facility]  No POA  Pt denies hx/admission for drug/etoh and psych/mental health  Pt reports she filled her Lovenox  PT recommending: Home w/nursing and home PT, walker, and commode  Pt agreeable to  VNA, and [facility]  Referral to  VNA for nursing and home Pt, and referral to [facility] for rw and commode via [facility]  CM reviewed d/c planning process including the following: identifying help at home, patient preference for d/c planning needs, Discharge Lounge, [facility] Meds to Bed program, availability of treatment team to discuss questions or concerns patient and/or family may have regarding understanding medications and recognizing signs and symptoms once discharged  CM also encouraged patient to follow up with all recommended appointments after discharge  Patient advised of importance for patient and family to participate in managing patients medical well being

## 2018-03-02 NOTE — PROGRESS NOTES
Orthopedics   Lili Coyne 60 y o  female MRN: [id number]  Unit/Bed#: CW3 349-01      Subjective:  60 y  o female post operative day 2 right total knee arthroplasty  No events overnight  Pain controlled       Labs:    0  Lab Value Date/Time   HCT 29 6 (L) 03/02/2018 0521   HCT 30 1 (L) 03/01/2018 0546   HCT 44 7 02/12/2018 1105   HGB 9 7 (L) 03/02/2018 0521   HGB 10 0 (L) 03/01/2018 0546   HGB 14 3 02/12/2018 1105   INR 0 94 02/12/2018 1105   WBC 11 18 (H) 03/02/2018 0521   WBC 10 10 03/01/2018 0546   WBC 8 49 02/12/2018 1105       Meds:    Current Facility-Administered Medications:     acetaminophen (TYLENOL) tablet 650 mg, 650 mg, Oral, Q6H, David Mcpherson MD, 650 mg at 03/02/18 0547    albuterol (PROVENTIL HFA,VENTOLIN HFA) inhaler 2 puff, 2 puff, Inhalation, Q6H PRN, David Mcpherson MD    ALPRAZolam Forestine Guitar) tablet 0 25 mg, 0 25 mg, Oral, HS PRN, David Mcpherson MD    calcium carbonate (TUMS) chewable tablet 1,000 mg, 1,000 mg, Oral, Daily PRN, David Mcpherson MD, 1,000 mg at 03/01/18 2342    docusate sodium (COLACE) capsule 100 mg, 100 mg, Oral, BID, David Mcpherson MD, 100 mg at 03/01/18 1800    enoxaparin (LOVENOX) subcutaneous injection 40 mg, 40 mg, Subcutaneous, Daily, David Mcpherson MD, 40 mg at 03/01/18 0759    lactated ringers infusion, 125 mL/hr, Intravenous, Continuous, David Mcpherson MD, Stopped at 03/01/18 0815    metoprolol succinate (TOPROL-XL) 24 hr tablet 50 mg, 50 mg, Oral, QPM, [place], PA-C    morphine injection 2 mg, 2 mg, Intravenous, Q2H PRN, David Mcpherson MD, 2 mg at 03/01/18 0151    ondansetron (ZOFRAN) injection 4 mg, 4 mg, Intravenous, Q6H PRN, Maddie Li MD, 4 mg at 03/02/18 0548    oxyCODONE (ROXICODONE) immediate release tablet 10 mg, 10 mg, Oral, Q4H PRN, David Mcpherson MD, 10 mg at 03/01/18 2326    oxyCODONE (ROXICODONE) IR tablet 5 mg, 5 mg, Oral, Q4H PRN, David Mcpherson MD    pantoprazole (PROTONIX) EC tablet 40 mg, 40 mg, Oral, BID, David Mcpherson MD, 40 mg at 03/01/18 1800    PARoxetine (PAXIL) tablet 10 mg, 10 mg, Oral, QPM, Luisa Hu MD, 10 mg at 03/01/18 1801    pravastatin (PRAVACHOL) tablet 10 mg, 10 mg, Oral, QPM, Luisa Hu MD, 10 mg at 03/01/18 1800    senna (SENOKOT) tablet 8 6 mg, 1 tablet, Oral, Daily, Luisa Hu MD, 8 6 mg at 03/01/18 0759    traMADol (ULTRAM) tablet 50 mg, 50 mg, Oral, Q6H [address], Luisa Hu MD, 50 mg at 03/02/18 0548    Blood Culture:   No results found for: BLOODCX    Wound Culture:   No results found for: WOUNDCULT    Ins and Outs:  I/O last 24 hours: In: 2160 [P O :660; I V :1500]  Out: 2700 [Urine:2450; Drains:250]          Physical:  Vitals:    03/01/18 2324   BP: 114/56   Pulse: 77   Resp: 16   Temp: 99 2 °F (37 3 °C)   SpO2: 94%     right lower extremity:  · Incision C/D/I  · Toes warm and well perfused  · SILT sp/dp/t  · +fhl/ehl    _*_*_*_*_*_*_*_*_*_*_*_*_*_*_*_*_*_*_*_*_*_*_*_*_*_*_*_*_*_*_*_*_*_*_*_*_*_*_*_*_*    Assessment: 60 y  o female post operative day 2 right total knee arthroplasty   Doing well    Plan:  · Weight Bearing as tolerated  · Up and out of bed  · DVT prophylaxis  · Analgesics  · PT/OT  · DC today  · Patient noted to have acute blood loss anemia due to a drop in Hbg of > 2 0g from preop levels, will monitor vital signs and resuscitate with IV fluids as needed    Luisa Hu MD

## 2018-03-05 ENCOUNTER — TELEPHONE (OUTPATIENT)
Dept: OBGYN CLINIC | Facility: HOSPITAL | Age: 63
End: 2018-03-05

## 2018-03-05 NOTE — TELEPHONE ENCOUNTER
Call placed as post-op follow up, spoke with pt  AVS and AVS med list reviewed, F/Us reviewed  Pt reports her Home Physical therapist just left  She reports she still has an outpt PT appt scheduled and that her physical therapist suggested she cancel it, pt planning to cancel outpt PT until notified she is cleared for outpt PT  Pt reporting her LBM was 2/27, she reports  She is not taking the docusate prescribed to her  I recommended that pt start taking docusate BID while taking narcotics, pt agreeable  Pt also reports she is planning to take a dulcolax tablet tonight with hopes she will have a BM soon  Pt reports she is passing gas, denies abdomen pain/distension/Nausea  I also suggested that the pt make sure she is getting up and ambulating, as well as drinking at least 8 glasses of water daily, pt reports she is doing these things already  Pt reports she is ambulating with the walker and denies any falls  She rates her pain 6/10 currently  She reports she is taking tylenol and tramadol Q6 hours to manage the pain and is taking a PRN oxycodone for breakthrough pain or prior to PT  Pt reports she is icing "multiple times a day", she reports she "slept really good last night"  Pt reports she is doing her lovenox daily and she denies bleeding from any source  Pt denies CP/SOB/dizziness/fevers/chills  She reports her physical therapist told her her incision "looks good", the pt denies any incisional drainage/heat/redness  Pt denies having questions/concerns at this time

## 2018-03-07 ENCOUNTER — APPOINTMENT (OUTPATIENT)
Dept: PHYSICAL THERAPY | Age: 63
End: 2018-03-07
Payer: COMMERCIAL

## 2018-03-09 ENCOUNTER — APPOINTMENT (OUTPATIENT)
Dept: RADIOLOGY | Facility: MEDICAL CENTER | Age: 63
End: 2018-03-09
Payer: COMMERCIAL

## 2018-03-09 ENCOUNTER — OFFICE VISIT (OUTPATIENT)
Dept: OBGYN CLINIC | Facility: MEDICAL CENTER | Age: 63
End: 2018-03-09

## 2018-03-09 VITALS
HEIGHT: 60 IN | DIASTOLIC BLOOD PRESSURE: 62 MMHG | HEART RATE: 65 BPM | BODY MASS INDEX: 31.02 KG/M2 | WEIGHT: 158 LBS | SYSTOLIC BLOOD PRESSURE: 100 MMHG

## 2018-03-09 DIAGNOSIS — Z48.89 AFTERCARE FOLLOWING SURGERY: Primary | ICD-10-CM

## 2018-03-09 PROCEDURE — 99024 POSTOP FOLLOW-UP VISIT: CPT | Performed by: ORTHOPAEDIC SURGERY

## 2018-03-09 PROCEDURE — 73560 X-RAY EXAM OF KNEE 1 OR 2: CPT

## 2018-03-13 ENCOUNTER — TELEPHONE (OUTPATIENT)
Dept: OBGYN CLINIC | Facility: HOSPITAL | Age: 63
End: 2018-03-13

## 2018-03-13 DIAGNOSIS — Z96.651 AFTERCARE FOLLOWING RIGHT KNEE JOINT REPLACEMENT SURGERY: Primary | ICD-10-CM

## 2018-03-13 DIAGNOSIS — Z47.1 AFTERCARE FOLLOWING RIGHT KNEE JOINT REPLACEMENT SURGERY: Primary | ICD-10-CM

## 2018-03-13 RX ORDER — TRAMADOL HYDROCHLORIDE 50 MG/1
50 TABLET ORAL EVERY 6 HOURS PRN
Qty: 60 TABLET | Refills: 0 | Status: SHIPPED | OUTPATIENT
Start: 2018-03-13 | End: 2018-05-18

## 2018-03-13 NOTE — TELEPHONE ENCOUNTER
Patient sees Dr Sha RILEYA calling stating that the patient is being discharged from skilled nursing today  PT will finish out the week  The patient is also asking for a refill on her Tramadol

## 2018-03-13 NOTE — TELEPHONE ENCOUNTER
Prescription printed and ready for pickup  Please call patient and inform of above  Please remind patient,  destination is at the [place] office    Thank you

## 2018-03-13 NOTE — TELEPHONE ENCOUNTER
A tramadol refill can be provided only via the [place] office  I would be happy to provide a refill provide the patient willing to travel to [place]  Please call the patient and advise of above    Please Re submit this if patient will  prescription [place] today

## 2018-03-19 ENCOUNTER — OFFICE VISIT (OUTPATIENT)
Dept: PHYSICAL THERAPY | Age: 63
End: 2018-03-19
Payer: COMMERCIAL

## 2018-03-19 DIAGNOSIS — Z48.89 AFTERCARE FOLLOWING SURGERY: Primary | ICD-10-CM

## 2018-03-19 PROCEDURE — G8979 MOBILITY GOAL STATUS: HCPCS | Performed by: PHYSICAL THERAPIST

## 2018-03-19 PROCEDURE — 97140 MANUAL THERAPY 1/> REGIONS: CPT | Performed by: PHYSICAL THERAPIST

## 2018-03-19 PROCEDURE — G8978 MOBILITY CURRENT STATUS: HCPCS | Performed by: PHYSICAL THERAPIST

## 2018-03-19 PROCEDURE — 97164 PT RE-EVAL EST PLAN CARE: CPT | Performed by: PHYSICAL THERAPIST

## 2018-03-19 PROCEDURE — 97110 THERAPEUTIC EXERCISES: CPT | Performed by: PHYSICAL THERAPIST

## 2018-03-19 NOTE — PROGRESS NOTES
PT Re-Evaluation     Today's date: 3/19/2018  Patient name: Chani Nobles  :   MRN: [id number]  Referring provider: Geoffrey Munoz MD  Dx:   Encounter Diagnosis     ICD-10-CM    1  Aftercare following surgery Z48 89                   Assessment  Impairments: abnormal gait, abnormal or restricted ROM, activity intolerance, impaired physical strength and pain with function    Patient is irritable  Assessment details: 60 yr old female patient reports to PT for re-evaluation s/p R TKA surgery on 18  Patient demonstrates limited AROM and PROM of R knee, but post manuals, patient was able to increase her ROM between 5-10 degrees  Patient had fair quad contraction during SAQ, as there was some superior glide of the patella when patient was activating her quad  Patient educated to not sleep with a pillow under her knee, and to try and wean from wearing the sleeve around her knee  Patient will continue to benefit from skilled PT services to address current impairments and functional limitations to help patient return to her PLOF  Understanding of Dx/Px/POC: good   Prognosis: good    Goals  Short Term goals - 4 weeks  1  Patient will be independent and compliant with a HEP  2   Patient will report a 50% decrease in pain complaints  Long Term goals - 8 weeks  1  Patient will report elimination of pain complaints  2   Patient will return to all work related activities without restriction  3   Patient will return to all recreational activities without restriction  4   Patient will ambulate with a normal gait pattern    5   Patient will negotiate stairs in a step over step pattern with use of 1 handrail     Plan  Patient would benefit from: skilled PT  Planned modality interventions: unattended electrical stimulation and cryotherapy  Planned therapy interventions: therapeutic exercise, therapeutic activities, stretching, strengthening, patient education, neuromuscular re-education, manual therapy, joint mobilization, gait training and home exercise program  Frequency: 2x week  Duration in weeks: 6  Treatment plan discussed with: patient        Subjective Evaluation    History of Present Illness  Date of surgery: 2018  Mechanism of injury: Patient reports to PT for re-evaluation  s/p R TKA surgery on 18  Patient states she has been having home PT for weeks after her surgery  Patient states she walks with her RW in the community, but at home she walks without an AD or her SPC  Patient notes she ices and elevates a few times a day  Patient reports having no complications, but she sleeps with a pillow under her knee, and still wears the sleeve around her knee "to help feel like her knee is more secure "  Patient already has her staples removed, per patient report  Quality of life: good    Pain  Current pain ratin  At best pain ratin  At worst pain ratin  Location: R knee  Quality: dull ache  Relieving factors: ice, medications, change in position, relaxation and rest  Aggravating factors: stair climbing, standing and walking  Progression: improved    Patient Goals  Patient goals for therapy: decreased pain, increased strength, increased motion and return to sport/leisure activities          Objective     Active Range of Motion     Right Knee   Flexion: 72 degrees with pain  Extension: Right knee active extension: lacking 17 degrees of knee extension   with pain    Mobility   Patellar Mobility:     Right Knee   Hypomobile: medial, lateral, superior and inferior           Precautions: none    Daily Treatment Diary     Manual  3/19            R knee flex/ext stretch 3x30" each            R knee patellar mobs 3x30" each direction                                                       Exercise Diary  3/19            Recumbent bike when able             Heel slide 10x             SAQ 2x10            SLR 10x            Front/lat step ups             Mini squats Modalities

## 2018-03-20 ENCOUNTER — TELEPHONE (OUTPATIENT)
Dept: OBGYN CLINIC | Facility: HOSPITAL | Age: 63
End: 2018-03-20

## 2018-03-20 NOTE — TELEPHONE ENCOUNTER
I am unsure why this patient is experiencing itchiness  Certainly taking narcotic medication can contribute to this  I think it is safe for her to continue taking a narcotic preparation despite her being itchy  Please call the patient and inform of above   Thank you

## 2018-03-20 NOTE — TELEPHONE ENCOUNTER
Patient given msg below and verbalized understanding  She will monitor for any problems with throat itching swelling and report ER should she have symptoms

## 2018-03-20 NOTE — TELEPHONE ENCOUNTER
Dr Brea Hernández patient - R TKA 2/28    Patient calling to let Dr Brea Hernández know that she has started out patient PT and is taking her oxycodone 5mg  As directed but now has noticed she is getting itchy  No rash or redness, denies throat itchiness, or swelling  I advised that she can take benedryl OTC for the itching and monitor for rash or throat swelling itching  If this should happen go to ER for treatment  She has been taking the oxycodone and never had itching before now  Please advise if okay for her to continue to take while taking benedryl and monitor for any rashes

## 2018-03-21 ENCOUNTER — OFFICE VISIT (OUTPATIENT)
Dept: PHYSICAL THERAPY | Age: 63
End: 2018-03-21
Payer: COMMERCIAL

## 2018-03-21 DIAGNOSIS — Z48.89 AFTERCARE FOLLOWING SURGERY: Primary | ICD-10-CM

## 2018-03-21 PROCEDURE — 97110 THERAPEUTIC EXERCISES: CPT | Performed by: PHYSICAL THERAPIST

## 2018-03-21 PROCEDURE — 97140 MANUAL THERAPY 1/> REGIONS: CPT | Performed by: PHYSICAL THERAPIST

## 2018-03-21 PROCEDURE — 97010 HOT OR COLD PACKS THERAPY: CPT | Performed by: PHYSICAL THERAPIST

## 2018-03-21 NOTE — PROGRESS NOTES
Daily Note     Today's date: 3/21/2018  Patient name: Contreras Mejía  : [id number]  MRN: [id number]  Referring provider: Frances Stover MD  Dx:   Encounter Diagnosis     ICD-10-CM    1  Aftercare following surgery Z48 89                   Subjective: No new complaints  Reports compliance with HEP  Objective: See treatment diary below  Daily Treatment Diary     Manual  3/19 3/21           R knee flex/ext stretch 3x30" each 10 min           R knee patellar mobs 3x30" each direction 5 min                                                      Exercise Diary  3/19 3/21           Recumbent bike when able  10 min           Heel slide 10x  20           SAQ 2x10 5 sec x20           SLR flex/abd 10x 1 5# 2x10           Front/lat step ups  20           Mini squats  5s x20                                                                                                                                                                                                     Modalities   3/21           CP post  10 min                                         Assessment: Tolerated treatment well  Patient would benefit from continued PT      Plan: Continue per plan of care

## 2018-03-21 NOTE — PROGRESS NOTES
PT Re-Evaluation     Today's date: 3/21/2018  Patient name: Todd Trujillo  :   MRN: [id number]  Referring provider: Myke Babcock MD  Dx:   Encounter Diagnosis     ICD-10-CM    1  Aftercare following surgery Z48 89                   Assessment  Impairments: abnormal gait, abnormal or restricted ROM, activity intolerance, impaired physical strength and pain with function    Patient is irritable  Assessment details: 60 yr old female patient reports to PT for re-evaluation s/p R TKA surgery on 18  Patient demonstrates limited AROM and PROM of R knee, but post manuals, patient was able to increase her ROM between 5-10 degrees  Patient had fair quad contraction during SAQ, as there was some superior glide of the patella when patient was activating her quad  Patient educated to not sleep with a pillow under her knee, and to try and wean from wearing the sleeve around her knee  Patient will continue to benefit from skilled PT services to address current impairments and functional limitations to help patient return to her PLOF  Understanding of Dx/Px/POC: good   Prognosis: good    Goals  Short Term goals - 4 weeks  1  Patient will be independent and compliant with a HEP  2   Patient will report a 50% decrease in pain complaints  Long Term goals - 8 weeks  1  Patient will report elimination of pain complaints  2   Patient will return to all work related activities without restriction  3   Patient will return to all recreational activities without restriction  4   Patient will ambulate with a normal gait pattern    5   Patient will negotiate stairs in a step over step pattern with use of 1 handrail     Plan  Patient would benefit from: skilled PT  Planned modality interventions: unattended electrical stimulation and cryotherapy  Planned therapy interventions: therapeutic exercise, therapeutic activities, stretching, strengthening, patient education, neuromuscular re-education, manual therapy, joint mobilization, gait training and home exercise program  Frequency: 2x week  Duration in weeks: 6  Treatment plan discussed with: patient        Subjective Evaluation    History of Present Illness  Date of surgery: 2018  Mechanism of injury: Patient reports to PT for re-evaluation  s/p R TKA surgery on 18  Patient states she has been having home PT for weeks after her surgery  Patient states she walks with her RW in the community, but at home she walks without an AD or her SPC  Patient notes she ices and elevates a few times a day  Patient reports having no complications, but she sleeps with a pillow under her knee, and still wears the sleeve around her knee "to help feel like her knee is more secure "  Patient already has her staples removed, per patient report  Quality of life: good    Pain  Current pain ratin  At best pain ratin  At worst pain ratin  Location: R knee  Quality: dull ache  Relieving factors: ice, medications, change in position, relaxation and rest  Aggravating factors: stair climbing, standing and walking  Progression: improved    Patient Goals  Patient goals for therapy: decreased pain, increased strength, increased motion and return to sport/leisure activities          Objective     Active Range of Motion     Right Knee   Flexion: 72 degrees with pain  Extension: Right knee active extension: lacking 17 degrees of knee extension   with pain    Mobility   Patellar Mobility:     Right Knee   Hypomobile: medial, lateral, superior and inferior           Precautions: none    Daily Treatment Diary     Manual  3/19            R knee flex/ext stretch 3x30" each            R knee patellar mobs 3x30" each direction                                                       Exercise Diary  3/19            Recumbent bike when able             Heel slide 10x             SAQ 2x10            SLR 10x            Front/lat step ups             Mini squats Modalities

## 2018-03-22 DIAGNOSIS — M17.11 PRIMARY LOCALIZED OSTEOARTHRITIS OF RIGHT KNEE: ICD-10-CM

## 2018-03-22 RX ORDER — OXYCODONE HYDROCHLORIDE 5 MG/1
TABLET ORAL
Qty: 60 TABLET | Refills: 0 | Status: CANCELLED | OUTPATIENT
Start: 2018-03-22

## 2018-03-22 RX ORDER — OXYCODONE HYDROCHLORIDE 5 MG/1
TABLET ORAL
Qty: 60 TABLET | Refills: 0 | Status: SHIPPED | OUTPATIENT
Start: 2018-03-22 | End: 2018-05-18

## 2018-03-22 NOTE — TELEPHONE ENCOUNTER
Patient s/p TKA 3/09/18    Patient calling for refill on her Oxycodone 5mg  She is wondering if we have the capability to have it taken to [facility] from our [place] office or would she need to pick it up   Please advise

## 2018-03-22 NOTE — TELEPHONE ENCOUNTER
This can be refilled either early this afternoon while in [place], or alternately this patient may desire a prescription  in when get tomorrow  Please contact the patient, and ascertain her wishes    Thank you

## 2018-03-26 ENCOUNTER — OFFICE VISIT (OUTPATIENT)
Dept: PHYSICAL THERAPY | Age: 63
End: 2018-03-26
Payer: COMMERCIAL

## 2018-03-26 DIAGNOSIS — Z48.89 AFTERCARE FOLLOWING SURGERY: Primary | ICD-10-CM

## 2018-03-26 PROCEDURE — 97010 HOT OR COLD PACKS THERAPY: CPT | Performed by: PHYSICAL THERAPIST

## 2018-03-26 PROCEDURE — 97110 THERAPEUTIC EXERCISES: CPT | Performed by: PHYSICAL THERAPIST

## 2018-03-26 PROCEDURE — 97140 MANUAL THERAPY 1/> REGIONS: CPT | Performed by: PHYSICAL THERAPIST

## 2018-03-26 NOTE — PROGRESS NOTES
Daily Note     Today's date: 3/26/2018  Patient name: Kumar Parham  :   MRN: [id number]  Referring provider: Niranjan Mejia MD  Dx:   Encounter Diagnosis     ICD-10-CM    1  Aftercare following surgery Z48 89                   Subjective: No new complaints  Reports compliance with HEP  Objective: See treatment diary below  Daily Treatment Diary     Manual  3/19 3/21 3/26          R knee flex/ext stretch 3x30" each 10 min 10m          R knee patellar mobs 3x30" each direction 5 min 5m                                                     Exercise Diary  3/19 3/21 3/26          Recumbent bike when able  10 min 12 m          Heel slide 10x  20 20          SAQ 2x10 5 sec x20 5s x20          SLR flex/abd 10x 1 5# 2x10 1 5# 2x15          Front/lat step ups  20 20          Mini squats  5s x20 5s x20                                                                                                                                                                                                    Modalities   3/21 3/26          CP post  10 min 10 m                                        Assessment: Motion continues to improve  Plan: Continue as planned

## 2018-03-29 ENCOUNTER — OFFICE VISIT (OUTPATIENT)
Dept: PHYSICAL THERAPY | Age: 63
End: 2018-03-29
Payer: COMMERCIAL

## 2018-03-29 DIAGNOSIS — Z48.89 AFTERCARE FOLLOWING SURGERY: Primary | ICD-10-CM

## 2018-03-29 PROCEDURE — 97140 MANUAL THERAPY 1/> REGIONS: CPT | Performed by: PHYSICAL THERAPIST

## 2018-03-29 PROCEDURE — 97110 THERAPEUTIC EXERCISES: CPT | Performed by: PHYSICAL THERAPIST

## 2018-03-29 PROCEDURE — 97010 HOT OR COLD PACKS THERAPY: CPT | Performed by: PHYSICAL THERAPIST

## 2018-03-29 NOTE — PROGRESS NOTES
Daily Note     Today's date: 3/29/2018  Patient name: Valerie Cruz  : [id number]  MRN: [id number]  Referring provider: Suze Bello MD  Dx:   Encounter Diagnosis     ICD-10-CM    1  Aftercare following surgery Z48 89                   Subjective: No new complaints  Objective: See treatment diary below  Daily Treatment Diary     Manual  3/19 3/21 3/26 3/29         R knee flex/ext stretch 3x30" each 10 min 10m 10m         R knee patellar mobs 3x30" each direction 5 min 5m 5m                                                    Exercise Diary  3/19 3/21 3/26 3/29         Recumbent bike when able  10 min 12 m 12m         Heel slide 10x  20 20 20         SAQ 2x10 5 sec x20 5s x20 2# 5s x20         SLR flex/abd 10x 1 5# 2x10 1 5# 2x15 1 5# 2x15         Front/lat step ups  20 20 20         Mini squats  5s x20 5s x20 5s x20         SLS - Foam    30s x5         Treadmill    x6                                                                                                                                                                         Modalities   3/21 3/26          CP post  10 min 10 m                                        Assessment:  Motion now allowing full revolution on bike  Gait improving  Plan: Continue as planned

## 2018-04-02 ENCOUNTER — OFFICE VISIT (OUTPATIENT)
Dept: PHYSICAL THERAPY | Age: 63
End: 2018-04-02
Payer: COMMERCIAL

## 2018-04-02 DIAGNOSIS — Z96.651 S/P TOTAL KNEE ARTHROPLASTY, RIGHT: Primary | ICD-10-CM

## 2018-04-02 PROCEDURE — 97010 HOT OR COLD PACKS THERAPY: CPT | Performed by: PHYSICAL THERAPIST

## 2018-04-02 PROCEDURE — 97110 THERAPEUTIC EXERCISES: CPT | Performed by: PHYSICAL THERAPIST

## 2018-04-02 PROCEDURE — 97140 MANUAL THERAPY 1/> REGIONS: CPT | Performed by: PHYSICAL THERAPIST

## 2018-04-02 NOTE — PROGRESS NOTES
Daily Note     Today's date: 2018  Patient name: Joe Daniel  :   MRN: [id number]  Referring provider: Sydney Mace MD  Dx:   Encounter Diagnosis     ICD-10-CM    1  S/P total knee arthroplasty, right Z96 651                   Subjective: Patient asking is she can drive    Objective: See treatment diary below  Daily Treatment Diary     Manual  3/19 3/21 3/26 3/29 4/2        R knee flex/ext stretch 3x30" each 10 min 10m 10m 10m        R knee patellar mobs 3x30" each direction 5 min 5m 5m 5m                                                   Exercise Diary  3/19 3/21 3/26 3/29 42        Recumbent bike when able  10 min 12 m 12m 12m        Heel slide 10x  20 20 20 20        SAQ 2x10 5 sec x20 5s x20 2# 5s x20 3# 5s x20        SLR flex/abd 10x 1 5# 2x10 1 5# 2x15 1 5# 2x15 NT        Front/lat step ups  20 20 20 30        Mini squats  5s x20 5s x20 5s x20 5s x20        SLS - Foam    30s x5 30s x5        Treadmill    x6 x10m        Leg press     2pl 20                                                                                                                                                           Modalities   3/21 3/26 4/3         CP post  10 min 10 m 10m                                       Assessment:  Progressing well with all aspects of PT      Plan: Continue as planned

## 2018-04-05 ENCOUNTER — OFFICE VISIT (OUTPATIENT)
Dept: PHYSICAL THERAPY | Age: 63
End: 2018-04-05
Payer: COMMERCIAL

## 2018-04-05 DIAGNOSIS — Z96.651 S/P TOTAL KNEE ARTHROPLASTY, RIGHT: Primary | ICD-10-CM

## 2018-04-05 PROCEDURE — 97140 MANUAL THERAPY 1/> REGIONS: CPT | Performed by: PHYSICAL THERAPIST

## 2018-04-05 PROCEDURE — 97110 THERAPEUTIC EXERCISES: CPT | Performed by: PHYSICAL THERAPIST

## 2018-04-05 PROCEDURE — 97010 HOT OR COLD PACKS THERAPY: CPT | Performed by: PHYSICAL THERAPIST

## 2018-04-05 NOTE — PROGRESS NOTES
Daily Note     Today's date: 2018  Patient name: Kary Castillo  :   MRN: [id number]  Referring provider: Bbeo Ambrosio MD  Dx:   Encounter Diagnosis     ICD-10-CM    1  S/P total knee arthroplasty, right Z96 651                   Subjective: Had increased pain after last session lateral knee  Minimized her HEP as a result  Objective: See treatment diary below  Daily Treatment Diary     Manual  3/19 3/21 3/26 3/29 4/2 4/5       R knee flex/ext stretch 3x30" each 10 min 10m 10m 10m 10m       R knee patellar mobs 3x30" each direction 5 min 5m 5m 5m 5m                                                  Exercise Diary  3/19 3/21 3/26 3/29 4/2 4/5       Recumbent bike when able  10 min 12 m 12m 12m 12m       Heel slide 10x  20 20 20 20 20       SAQ 2x10 5 sec x20 5s x20 2# 5s x20 3# 5s x20 4# 5s x20       SLR flex/abd 10x 1 5# 2x10 1 5# 2x15 1 5# 2x15 NT NT       Front/lat step ups  20 20 20 30 30       Mini squats  5s x20 5s x20 5s x20 5s x20 5s x20       SLS - Foam    30s x5 30s x5 NT       Treadmill    x6 x10m NT       Leg press     2pl 20 NT                                                                                                                                                          Modalities   3/21 3/26 4/3 4/5        CP post  10 min 10 m 10m 10m                                      Assessment:  Minimized resistance exercises due to pain complaints  Plan: Continue as planned

## 2018-04-06 ENCOUNTER — OFFICE VISIT (OUTPATIENT)
Dept: OBGYN CLINIC | Facility: MEDICAL CENTER | Age: 63
End: 2018-04-06

## 2018-04-06 VITALS — WEIGHT: 155 LBS | HEIGHT: 60 IN | BODY MASS INDEX: 30.43 KG/M2

## 2018-04-06 DIAGNOSIS — Z96.651 S/P TOTAL KNEE ARTHROPLASTY, RIGHT: Primary | ICD-10-CM

## 2018-04-06 DIAGNOSIS — Z48.89 AFTERCARE FOLLOWING SURGERY: ICD-10-CM

## 2018-04-06 PROCEDURE — 99024 POSTOP FOLLOW-UP VISIT: CPT | Performed by: ORTHOPAEDIC SURGERY

## 2018-04-06 RX ORDER — CEPHALEXIN 500 MG/1
CAPSULE ORAL
Qty: 12 CAPSULE | Refills: 0 | Status: SHIPPED | OUTPATIENT
Start: 2018-04-06 | End: 2018-04-13

## 2018-04-06 NOTE — PROGRESS NOTES
Subjective; Six weeks status post right total knee replacement  She is receiving physical therapy  She has significant relief of her preprocedural pain  Past Medical History:   Diagnosis Date    Anxiety     GERD (gastroesophageal reflux disease)     History of palpitations     Hypertension     Irritable bowel syndrome        Past Surgical History:   Procedure Laterality Date    CARDIAC CATHETERIZATION       SECTION      ESOPHAGOGASTRODUODENOSCOPY N/A 2016    Procedure: ESOPHAGOGASTRODUODENOSCOPY (EGD); Surgeon: Jasmin Wiseman MD;  Location: [facility]; Service:     KNEE ARTHROSCOPY      NE TOTAL KNEE ARTHROPLASTY Right 2018    Procedure: ARTHROPLASTY KNEE TOTAL;  Surgeon: Lesvia Young MD;  Location: [facility];  Service: Orthopedics       Family History   Problem Relation Age of Onset    Cancer Mother     Heart attack Father        Social History   Substance Use Topics    Smoking status: Former Smoker     Packs/day: 0 50     Years: 20 00    Smokeless tobacco: Never Used      Comment: quit 13 years ago     Alcohol use No     Exam'    She has a well-healed vertical incision overlying her right knee  She has an extension lag of 5° which is supple  She has flexion to 90° and then has to lift her buttock  She has no anterior to posterior instability      Impression; Six week follow-up right total knee replacement    ;Plan    She will return in 6 additional weeks  She will continue physical therapy  She was given a joint recipient identification card  She was provided dental antibiotic prophylaxis  In the future should her left knee offer her significant discomfort she would warrant consideration for an injection to the left knee  Her exam was provided by and plan formulated by the attending surgeon, it was my privilege to assist him in its delivery

## 2018-04-09 ENCOUNTER — OFFICE VISIT (OUTPATIENT)
Dept: PHYSICAL THERAPY | Age: 63
End: 2018-04-09
Payer: COMMERCIAL

## 2018-04-09 DIAGNOSIS — Z96.651 S/P TOTAL KNEE ARTHROPLASTY, RIGHT: Primary | ICD-10-CM

## 2018-04-09 PROCEDURE — 97110 THERAPEUTIC EXERCISES: CPT

## 2018-04-09 PROCEDURE — 97140 MANUAL THERAPY 1/> REGIONS: CPT

## 2018-04-09 NOTE — PROGRESS NOTES
Daily Note     Today's date: 2018  Patient name: Joe Daniel  :   MRN: [id number]  Referring provider: Sydney Mace MD  Dx:   Encounter Diagnosis     ICD-10-CM    1  S/P total knee arthroplasty, right Z96 651                   Subjective: Reports pain is gradually improving with R knee but still feels she cannot get R knee ext  better  Objective: See treatment diary below  Daily Treatment Diary     Manual  3/19 3/21 3/26 3/29 4/2 4/5 4/9      R knee flex/ext stretch 3x30" each 10 min 10m 10m 10m 10m 10m      R knee patellar mobs 3x30" each direction 5 min 5m 5m 5m 5m 5m                                                 Exercise Diary  3/19 3/21 3/26 3/29 4/2 4/5 4/9      Recumbent bike when able  10 min 12 m 12m 12m 12m 12m      Heel slide 10x  20 20 20 20 20 20      SAQ 2x10 5 sec x20 5s x20 2# 5s x20 3# 5s x20 4# 5s x20 4# 5 sx 20      SLR flex/abd 10x 1 5# 2x10 1 5# 2x15 1 5# 2x15 NT NT 1 5# 2 x10      Front/lat step ups  20 20 20 30 30 30      Mini squats  5s x20 5s x20 5s x20 5s x20 5s x20 5sx20      SLS - Foam    30s x5 30s x5 NT       Treadmill    x6 x10m NT       Leg press     2pl 20 NT 2pl x20      Flexionater       10 min                                                                                                                                            Modalities   3/21 3/26 4/3 4/5  4/9      CP post  10 min 10 m 10m 10m  10m                                    Assessment:  Stressed more active flexion through ex as pt still limited with guarding when passively stretching R knee   Still has some joint line pain on R at times      Plan: Continue as planned

## 2018-04-12 ENCOUNTER — OFFICE VISIT (OUTPATIENT)
Dept: PHYSICAL THERAPY | Age: 63
End: 2018-04-12
Payer: COMMERCIAL

## 2018-04-12 DIAGNOSIS — Z96.651 S/P TOTAL KNEE ARTHROPLASTY, RIGHT: Primary | ICD-10-CM

## 2018-04-12 PROCEDURE — 97110 THERAPEUTIC EXERCISES: CPT

## 2018-04-12 PROCEDURE — 97140 MANUAL THERAPY 1/> REGIONS: CPT

## 2018-04-12 NOTE — PROGRESS NOTES
Daily Note     Today's date: 2018  Patient name: Rama Stover  : [id number]  MRN: [id number]  Referring provider: Valerie Ibarra MD  Dx:   Encounter Diagnosis     ICD-10-CM    1  S/P total knee arthroplasty, right Z96 651                   Subjective: Reports pain is gradually improving with R knee but still feels she cannot get R knee ext  better  Objective: See treatment diary below  Daily Treatment Diary     Manual  3/19 3/21 3/26 3/29 4/2 4/5 4/9 4/12     R knee flex/ext stretch 3x30" each 10 min 10m 10m 10m 10m 10m 10m     R knee patellar mobs 3x30" each direction 5 min 5m 5m 5m 5m 5m 5m                                                Exercise Diary  3/19 3/21 3/26 3/29 4/2 4/5 4/9 4/12     Recumbent bike when able  10 min 12 m 12m 12m 12m 12m 12m     Heel slide 10x  20 20 20 20 20 20      SAQ 2x10 5 sec x20 5s x20 2# 5s x20 3# 5s x20 4# 5s x20 4# 5 sx 20 4# 5 s x30     SLR flex/abd 10x 1 5# 2x10 1 5# 2x15 1 5# 2x15 NT NT 1 5# 2 x10 2# 2x10     Front/lat step ups  20 20 20 30 30 30 30     Mini squats  5s x20 5s x20 5s x20 5s x20 5s x20 5sx20 5sx20     SLS - Foam    30s x5 30s x5 NT       Treadmill    x6 x10m NT       Leg press     2pl 20 NT 2pl x20 2 pl 2 x20     Flexionater       10 min      LAQ        4# x30     Step up onto 18 in step        30                                                                                                                 Modalities   3/21 3/26 4/3 4/5  4/9 4/12     CP post  10 min 10 m 10m 10m  10m 10m                                   Assessment:  Stressed more active flexion through ex as pt still limited with guarding when passively stretching R knee   Still has some joint line pain on R at times      Plan: Continue as planned

## 2018-04-16 ENCOUNTER — OFFICE VISIT (OUTPATIENT)
Dept: FAMILY MEDICINE CLINIC | Facility: CLINIC | Age: 63
End: 2018-04-16
Payer: COMMERCIAL

## 2018-04-16 ENCOUNTER — OFFICE VISIT (OUTPATIENT)
Dept: PHYSICAL THERAPY | Age: 63
End: 2018-04-16
Payer: COMMERCIAL

## 2018-04-16 VITALS
WEIGHT: 151 LBS | BODY MASS INDEX: 29.64 KG/M2 | DIASTOLIC BLOOD PRESSURE: 70 MMHG | HEART RATE: 82 BPM | SYSTOLIC BLOOD PRESSURE: 118 MMHG | RESPIRATION RATE: 16 BRPM | OXYGEN SATURATION: 98 % | HEIGHT: 60 IN | TEMPERATURE: 97.6 F

## 2018-04-16 DIAGNOSIS — E78.5 DYSLIPIDEMIA: ICD-10-CM

## 2018-04-16 DIAGNOSIS — K21.9 CHRONIC GERD: ICD-10-CM

## 2018-04-16 DIAGNOSIS — I10 HYPERTENSION, UNSPECIFIED TYPE: Primary | ICD-10-CM

## 2018-04-16 DIAGNOSIS — Z96.651 S/P TOTAL KNEE ARTHROPLASTY, RIGHT: Primary | ICD-10-CM

## 2018-04-16 PROCEDURE — 3078F DIAST BP <80 MM HG: CPT | Performed by: INTERNAL MEDICINE

## 2018-04-16 PROCEDURE — 97110 THERAPEUTIC EXERCISES: CPT

## 2018-04-16 PROCEDURE — 3074F SYST BP LT 130 MM HG: CPT | Performed by: INTERNAL MEDICINE

## 2018-04-16 PROCEDURE — 99213 OFFICE O/P EST LOW 20 MIN: CPT | Performed by: INTERNAL MEDICINE

## 2018-04-16 PROCEDURE — 97140 MANUAL THERAPY 1/> REGIONS: CPT

## 2018-04-16 NOTE — PROGRESS NOTES
Daily Note     Today's date: 2018  Patient name: Guadalupe Woodard  : 12[birth year]  MRN: [id number]  Referring provider: Laura Mabry MD  Dx:   Encounter Diagnosis     ICD-10-CM    1  S/P total knee arthroplasty, right Z96 651                   Subjective: Reports she has no minimal pain in R knee today for 1st time  Also reports control has improved descending steps  Objective: See treatment diary below  Daily Treatment Diary     Manual  3/19 3/21 3/26 3/29 4/2 4/5 4/9 4/12 4/16    R knee flex/ext stretch 3x30" each 10 min 10m 10m 10m 10m 10m 10m 10m    R knee patellar mobs 3x30" each direction 5 min 5m 5m 5m 5m 5m 5m 5m                                               Exercise Diary  3/19 3/21 3/26 3/29 4/2 4/5 4/9 4/12 4/16    Recumbent bike when able  10 min 12 m 12m 12m 12m 12m 12m 12m    Heel slide 10x  20 20 20 20 20 20      SAQ 2x10 5 sec x20 5s x20 2# 5s x20 3# 5s x20 4# 5s x20 4# 5 sx 20 4# 5 s x30 5# 3x10    SLR flex/abd 10x 1 5# 2x10 1 5# 2x15 1 5# 2x15 NT NT 1 5# 2 x10 2# 2x10 2 5#  3x10    Front/lat step ups  20 20 20 30 30 30 30 30    Mini squats  5s x20 5s x20 5s x20 5s x20 5s x20 5sx20 5sx20 5sx20    SLS - Foam    30s x5 30s x5 NT       Treadmill    x6 x10m NT       Leg press     2pl 20 NT 2pl x20 2 pl 2 x20 2 5 2x10    Flexionater       10 min      LAQ        4# x30 45# x 30    Step up onto 24 in step        30 30                                                                                                                Modalities   3/21 3/26 4/3 4/5  4/9 4/12 4/16    CP post  10 min 10 m 10m 10m  10m 10m NT                                  Assessment:  Stressed more active flexion through ex as pt still limited with guarding when passively stretching R knee   Also continued to stress pt to work on R knee ext as well at home    Plan: Continue as planned

## 2018-04-16 NOTE — PROGRESS NOTES
Assessment/Plan:         There are no diagnoses linked to this encounter  Subjective:      Patient ID: Raul Hester is a 60 y o  female  Pt without complaints  Denies cp/sob        The following portions of the patient's history were reviewed and updated as appropriate:   She  has a past medical history of Anxiety; GERD (gastroesophageal reflux disease); History of palpitations; Hypertension; and Irritable bowel syndrome  She   Patient Active Problem List    Diagnosis Date Noted    Aftercare following surgery 2018    S/P total knee arthroplasty, right 2018    Chronic GERD 2018    Bilateral knee pain 2017    Depression 2013    Dyslipidemia 2012    Hypertension 2012     She  has a past surgical history that includes  section; Esophagogastroduodenoscopy (N/A, 2016); Knee arthroscopy (); Cardiac catheterization; and pr total knee arthroplasty (Right, 2018)  Her family history includes Cancer in her mother; Heart attack in her father  She  reports that she has quit smoking  She has a 10 00 pack-year smoking history  She has never used smokeless tobacco  She reports that she does not drink alcohol or use drugs  Current Outpatient Prescriptions   Medication Sig Dispense Refill    acetaminophen (TYLENOL) 325 mg tablet Take 2 tablets (650 mg total) by mouth every 6 (six) hours 90 tablet 0    losartan (COZAAR) 50 mg tablet Take 50 mg by mouth every evening        metoprolol succinate (TOPROL-XL) 50 mg 24 hr tablet Take 50 mg by mouth every evening Pt hasnt taken it for awhile        oxyCODONE (ROXICODONE) 5 mg immediate release tablet Take 1-2 tablets by mouth every 4-6 hours as needed for pain  60 tablet 0    pantoprazole (PROTONIX) 40 mg tablet Take 40 mg by mouth 2 (two) times a day        PARoxetine (PAXIL) 10 mg tablet Take 10 mg by mouth every evening        pravastatin (PRAVACHOL) 10 mg tablet Take 10 mg by mouth every evening        traMADol (ULTRAM) 50 mg tablet Take 1 tablet (50 mg total) by mouth every 6 (six) hours as needed for moderate pain 60 tablet 0    docusate sodium (COLACE) 100 mg capsule Take 1 capsule (100 mg total) by mouth 2 (two) times a day 20 capsule 0    enoxaparin (LOVENOX) 40 mg/0 4 mL Inject 0 4 mL (40 mg total) under the skin daily for 28 days 11 2 mL 0     No current facility-administered medications for this visit  Current Outpatient Prescriptions on File Prior to Visit   Medication Sig    acetaminophen (TYLENOL) 325 mg tablet Take 2 tablets (650 mg total) by mouth every 6 (six) hours    losartan (COZAAR) 50 mg tablet Take 50 mg by mouth every evening      metoprolol succinate (TOPROL-XL) 50 mg 24 hr tablet Take 50 mg by mouth every evening Pt hasnt taken it for awhile      oxyCODONE (ROXICODONE) 5 mg immediate release tablet Take 1-2 tablets by mouth every 4-6 hours as needed for pain   pantoprazole (PROTONIX) 40 mg tablet Take 40 mg by mouth 2 (two) times a day   PARoxetine (PAXIL) 10 mg tablet Take 10 mg by mouth every evening      pravastatin (PRAVACHOL) 10 mg tablet Take 10 mg by mouth every evening      traMADol (ULTRAM) 50 mg tablet Take 1 tablet (50 mg total) by mouth every 6 (six) hours as needed for moderate pain    docusate sodium (COLACE) 100 mg capsule Take 1 capsule (100 mg total) by mouth 2 (two) times a day    enoxaparin (LOVENOX) 40 mg/0 4 mL Inject 0 4 mL (40 mg total) under the skin daily for 28 days     No current facility-administered medications on file prior to visit  She is allergic to bactrim [sulfamethoxazole-trimethoprim]; prednisone; and sulfa antibiotics       Review of Systems   Constitutional: Negative  HENT: Negative  Eyes: Negative  Respiratory: Negative  Cardiovascular: Negative            Objective:      /70 (BP Location: Left arm, Patient Position: Sitting, Cuff Size: Large)   Pulse 82   Temp 97 6 °F (36 4 °C) (Tympanic) Resp 16   Ht 5' (1 524 m)   Wt 68 5 kg (151 lb)   SpO2 98%   BMI 29 49 kg/m²          Physical Exam   Constitutional: She appears well-developed and well-nourished  HENT:   Head: Normocephalic and atraumatic  Neck: Normal range of motion  Neck supple  Cardiovascular: Normal rate and regular rhythm      Pulmonary/Chest: Effort normal and breath sounds normal

## 2018-04-19 ENCOUNTER — OFFICE VISIT (OUTPATIENT)
Dept: PHYSICAL THERAPY | Age: 63
End: 2018-04-19
Payer: COMMERCIAL

## 2018-04-19 DIAGNOSIS — Z96.651 S/P TOTAL KNEE ARTHROPLASTY, RIGHT: Primary | ICD-10-CM

## 2018-04-19 DIAGNOSIS — Z48.89 AFTERCARE FOLLOWING SURGERY: ICD-10-CM

## 2018-04-19 PROCEDURE — 97140 MANUAL THERAPY 1/> REGIONS: CPT

## 2018-04-19 PROCEDURE — 97010 HOT OR COLD PACKS THERAPY: CPT

## 2018-04-19 PROCEDURE — 97110 THERAPEUTIC EXERCISES: CPT

## 2018-04-19 NOTE — PROGRESS NOTES
Daily Note     Today's date: 2018  Patient name: Joe Daniel  : [id number]  MRN: [id number]  Referring provider: Sydney Mace MD  Dx:   Encounter Diagnosis     ICD-10-CM    1  S/P total knee arthroplasty, right Z96 651    2  Aftercare following surgery Z48 89                   Subjective: Patient has no complaints prior to PT today  Objective: See treatment diary below      Assessment: Tolerated treatment well  Patient exhibited good technique with therapeutic exercises  Able to progress weights as per chart below  No difficulty with any TE  Encouraged to keep focus on ext stretching  Plan: Continue per plan of care       Daily Treatment Diary      Manual  3/19 3/21 3/26 3/29 4 4/   R knee flex/ext stretch 3x30" each 10 min 10m 10m 10m 10m 10m 10m 10m  10m   R knee patellar mobs 3x30" each direction 5 min 5m 5m 5m 5m 5m 5m 5m  5m                                                                                 Exercise Diary  3/19 3/21 3/26 3/29 4 4/   Recumbent bike when able   10 min 12 m 12m 12m 12m 12m 12m 12m  10 min   Heel slide 10x  20 20 20 20 20 20         SAQ 2x10 5 sec x20 5s x20 2# 5s x20 3# 5s x20 4# 5s x20 4# 5 sx 20 4# 5 s x30 5# 3x10  5# 5s x30   SLR flex/abd 10x 1 5# 2x10 1 5# 2x15 1 5# 2x15 NT NT 1 5# 2 x10 2# 2x10 2 5#  3x10  3# 2x10   Front/lat step ups   20 20 20 30 30 30 30 30  30   Mini squats   5s x20 5s x20 5s x20 5s x20 5s x20 5sx20 5sx20 5sx20  5sx20   SLS - Foam       30s x5 30s x5 NT           Treadmill       x6 x10m NT           Leg press         2pl 20 NT 2pl x20 2 pl 2 x20 2 5 2x10  2 5 3x10   Flexionater             10 min         LAQ               4# x30 45# x 30  5# x30 3"   Step up onto 24 in step               30 30  30                                                                                                                                                                                                       Modalities    3/21 3/26 4/3 4/5   4/9 4/12 4/16  4/19   CP post   10 min 10 m 10m 10m   10m 10m NT  10m

## 2018-04-23 ENCOUNTER — OFFICE VISIT (OUTPATIENT)
Dept: PHYSICAL THERAPY | Age: 63
End: 2018-04-23
Payer: COMMERCIAL

## 2018-04-23 DIAGNOSIS — Z96.651 S/P TOTAL KNEE ARTHROPLASTY, RIGHT: Primary | ICD-10-CM

## 2018-04-23 PROCEDURE — 97140 MANUAL THERAPY 1/> REGIONS: CPT | Performed by: PHYSICAL THERAPIST

## 2018-04-23 PROCEDURE — 97010 HOT OR COLD PACKS THERAPY: CPT | Performed by: PHYSICAL THERAPIST

## 2018-04-23 PROCEDURE — 97110 THERAPEUTIC EXERCISES: CPT | Performed by: PHYSICAL THERAPIST

## 2018-04-23 NOTE — PROGRESS NOTES
Daily Note     Today's date: 2018  Patient name: Simon Hamman  : [id number]  MRN: [id number]  Referring provider: Axel Hicks MD  Dx:   Encounter Diagnosis     ICD-10-CM    1  S/P total knee arthroplasty, right Z96 651                   Subjective: No new complaints      Objective: See treatment diary below  Daily Treatment Diary      Manual  4/23 3/21 3/26 3/29 4/2 4/5 4/9 4/12 4/16  4/19   R knee flex/ext stretch 3x30" each 10 min 10m 10m 10m 10m 10m 10m 10m  10m   R knee patellar mobs 3x30" each direction 5 min 5m 5m 5m 5m 5m 5m 5m  5m                                                                                 Exercise Diary  4/23 3/21 3/26 3/29 4/2 4/5 4/9 4/12 4/16  4/19   Recumbent bike when able  12 min 10 min 12 m 12m 12m 12m 12m 12m 12m  10 min   Heel slide 10x  20 20 20 20 20 20         SAQ NT 5 sec x20 5s x20 2# 5s x20 3# 5s x20 4# 5s x20 4# 5 sx 20 4# 5 s x30 5# 3x10  5# 5s x30   SLR flex/abd NT 1 5# 2x10 1 5# 2x15 1 5# 2x15 NT NT 1 5# 2 x10 2# 2x10 2 5#  3x10  3# 2x10   Front/lat step ups  30 20 20 20 30 30 30 30 30  30   Mini squats  5s x20 5s x20 5s x20 5s x20 5s x20 5s x20 5sx20 5sx20 5sx20  5sx20   SLS - Foam  30s x5     30s x5 30s x5 NT           Treadmill  NT     x6 x10m NT           Leg press  3pl 2x15       2pl 20 NT 2pl x20 2 pl 2 x20 2 5 2x10  2 5 3x10   Flexionater             10 min         LAQ (cybex) 1 5pl 2x15             4# x30 45# x 30  5# x30 3"   Ecc lower 2x10             30 30  30                                                                                                                                                                                                         Modalities    4/23 3/26 4/3 4   CP post   10 min 10 m 10m 10m   10m 10m NT  10m                               Assessment: Needs improvement in eccentric control      Plan: Continue per plan of care

## 2018-04-26 ENCOUNTER — OFFICE VISIT (OUTPATIENT)
Dept: PHYSICAL THERAPY | Age: 63
End: 2018-04-26
Payer: COMMERCIAL

## 2018-04-26 DIAGNOSIS — Z96.651 S/P TOTAL KNEE ARTHROPLASTY, RIGHT: Primary | ICD-10-CM

## 2018-04-26 PROCEDURE — 97010 HOT OR COLD PACKS THERAPY: CPT | Performed by: PHYSICAL THERAPIST

## 2018-04-26 PROCEDURE — G8979 MOBILITY GOAL STATUS: HCPCS | Performed by: PHYSICAL THERAPIST

## 2018-04-26 PROCEDURE — 97140 MANUAL THERAPY 1/> REGIONS: CPT | Performed by: PHYSICAL THERAPIST

## 2018-04-26 PROCEDURE — 97110 THERAPEUTIC EXERCISES: CPT | Performed by: PHYSICAL THERAPIST

## 2018-04-26 PROCEDURE — G8978 MOBILITY CURRENT STATUS: HCPCS | Performed by: PHYSICAL THERAPIST

## 2018-04-26 NOTE — PROGRESS NOTES
Daily Note     Today's date: 2018  Patient name: Kirti Schwartz  :   MRN: [id number]  Referring provider: Melani Vazquez MD  Dx:   Encounter Diagnosis     ICD-10-CM    1  S/P total knee arthroplasty, right Z96 651                   Subjective: No new complaints      Objective: See treatment diary below  Daily Treatment Diary      Manual  4/23 4/26 3/26 3/29 4/2 4/5 4/9 4/12 4/16  4/19   R knee flex/ext stretch 3x30" each 10 min 10m 10m 10m 10m 10m 10m 10m  10m   R knee patellar mobs 3x30" each direction 5 min 5m 5m 5m 5m 5m 5m 5m  5m                                                                                 Exercise Diary  4/23 4/26 3/26 3/29 4/2 4/5 4/9 4/12 4/16  4/19   Recumbent bike when able  12 min 13 min 12 m 12m 12m 12m 12m 12m 12m  10 min   Heel slide 10x  20 20 20 20 20 20         SAQ NT Cybex 2pl 2x15 5s x20 2# 5s x20 3# 5s x20 4# 5s x20 4# 5 sx 20 4# 5 s x30 5# 3x10  5# 5s x30   SLR flex/abd NT NT 1 5# 2x15 1 5# 2x15 NT NT 1 5# 2 x10 2# 2x10 2 5#  3x10  3# 2x10   Front/lat step ups  30 20 20 20 30 30 30 30 30  30   Mini squats  5s x20 5s x20 5s x20 5s x20 5s x20 5s x20 5sx20 5sx20 5sx20  5sx20   SLS - Foam  30s x5  30s x6   30s x5 30s x5 NT           Treadmill  NT     x6 x10m NT           Leg press  3pl 2x15  3pl 2x15     2pl 20 NT 2pl x20 2 pl 2 x20 2 5 2x10  2 5 3x10   Flexionater             10 min         LAQ (cybex) 1 5pl 2x15             4# x30 45# x 30  5# x30 3"   Ecc lower 2x10  2x10           30 30  30    side stepping   x5                                                                                                                                                                                                 Modalities    4/23 4/26 4/3 4   CP post   10 min 10 m 10m 10m   10m 10m NT  10m                               Assessment: Better performance of eccentric lowering      Plan: Continue per plan of care

## 2018-04-30 PROCEDURE — G8979 MOBILITY GOAL STATUS: HCPCS | Performed by: PHYSICAL THERAPIST

## 2018-04-30 PROCEDURE — G8978 MOBILITY CURRENT STATUS: HCPCS | Performed by: PHYSICAL THERAPIST

## 2018-05-01 ENCOUNTER — OFFICE VISIT (OUTPATIENT)
Dept: PHYSICAL THERAPY | Age: 63
End: 2018-05-01
Payer: COMMERCIAL

## 2018-05-01 DIAGNOSIS — Z96.651 S/P TOTAL KNEE ARTHROPLASTY, RIGHT: Primary | ICD-10-CM

## 2018-05-01 PROCEDURE — 97110 THERAPEUTIC EXERCISES: CPT | Performed by: PHYSICAL THERAPIST

## 2018-05-01 PROCEDURE — 97140 MANUAL THERAPY 1/> REGIONS: CPT | Performed by: PHYSICAL THERAPIST

## 2018-05-01 NOTE — PROGRESS NOTES
Daily Note     Today's date: 2018  Patient name: Keshia Christopher  :   MRN: [id number]  Referring provider: Gigi Hairston MD  Dx:   Encounter Diagnosis     ICD-10-CM    1  S/P total knee arthroplasty, right Z96 651                   Subjective: No new complaints      Objective: See treatment diary below  Assist from [facility] [phone]  Daily Treatment Diary      Manual  4/23 4/26 5/1 3/29 4/2 4/5 4/9 4/12 4/16  4/19   R knee flex/ext stretch 3x30" each 10 min 10m 10m 10m 10m 10m 10m 10m  10m   R knee patellar mobs 3x30" each direction 5 min 5m 5m 5m 5m 5m 5m 5m  5m                                                                                 Exercise Diary  4/23 4/26 5/1 3/29 4/2 4/5 4/9 4/12 4/16  4/19   Recumbent bike when able  12 min 13 min 12 m 12m 12m 12m 12m 12m 12m  10 min   Heel slide 10x  20 20 20 20 20 20         SAQ NT Cybex 2pl 2x15 5s x20 2# 5s x20 3# 5s x20 4# 5s x20 4# 5 sx 20 4# 5 s x30 5# 3x10  5# 5s x30   SLR flex/abd NT NT 1 5# 2x15 1 5# 2x15 NT NT 1 5# 2 x10 2# 2x10 2 5#  3x10  3# 2x10   Front/lat step ups  30 20 20 20 30 30 30 30 30  30   Mini squats  5s x20 5s x20 5s x20 5s x20 5s x20 5s x20 5sx20 5sx20 5sx20  5sx20   SLS - Foam  30s x5  30s x6  45sec x5 30s x5 30s x5 NT           Treadmill  NT     x6 x10m NT           Leg press  3pl 2x15  3pl 2x15     2pl 20 NT 2pl x20 2 pl 2 x20 2 5 2x10  2 5 3x10   Flexionater             10 min         LAQ (cybex) 1 5pl 2x15             4# x30 45# x 30  5# x30 3"   Ecc lower 2x10  2x10  2x10         30 30  30    side stepping   x5                    step downs 6in      2x10                                                                                                                                                                       Modalities    4/23 4/26 4/3 4/5   4/9 4/12 4/16  4/19   CP post   10 min 10 m 10m 10m   10m 10m NT  10m                               Assessment: Better performance of eccentric lowering   Added in step downs to further improve control on steps      Plan: Continue per plan of care

## 2018-05-03 ENCOUNTER — OFFICE VISIT (OUTPATIENT)
Dept: PHYSICAL THERAPY | Age: 63
End: 2018-05-03
Payer: COMMERCIAL

## 2018-05-03 DIAGNOSIS — Z96.651 S/P TOTAL KNEE ARTHROPLASTY, RIGHT: Primary | ICD-10-CM

## 2018-05-03 PROCEDURE — 97140 MANUAL THERAPY 1/> REGIONS: CPT | Performed by: PHYSICAL THERAPIST

## 2018-05-03 PROCEDURE — 97110 THERAPEUTIC EXERCISES: CPT | Performed by: PHYSICAL THERAPIST

## 2018-05-03 NOTE — PROGRESS NOTES
Daily Note     Today's date: 5/3/2018  Patient name: Wendy Johnson  :   MRN: [id number]  Referring provider: Manohar Nobles MD  Dx:   Encounter Diagnosis     ICD-10-CM    1  S/P total knee arthroplasty, right Z96 651                   Subjective: No new complaints      Objective: See treatment diary below   Assist from [facility] [phone]  Daily Treatment Diary      Manual  4/23 4/26 5/1 5/3 4/2 4/5 4/9 4/12 4/16  4/19   R knee flex/ext stretch 3x30" each 10 min 10m 10m 10m 10m 10m 10m 10m  10m   R knee patellar mobs 3x30" each direction 5 min 5m 5m 5m 5m 5m 5m 5m  5m                                                                                 Exercise Diary  4/23 4/26 5/1 5/3 4/2 4/5 4/9 4/12 4/16  4/19   Recumbent bike when able  12 min 13 min 12 m 12m 12m 12m 12m 12m 12m  10 min   Heel slide 10x  20 20 20 20 20 20         SAQ NT Cybex 2pl 2x15 5s x20 2# 5s x20 3# 5s x20 4# 5s x20 4# 5 sx 20 4# 5 s x30 5# 3x10  5# 5s x30   SLR flex/abd NT NT 1 5# 2x15 1 5# 2x15 NT NT 1 5# 2 x10 2# 2x10 2 5#  3x10  3# 2x10   Front/lat step ups  30 20 20 20 30 30 30 30 30  30   Mini squats  5s x20 5s x20 5s x20 5s x20 5s x20 5s x20 5sx20 5sx20 5sx20  5sx20   SLS - Foam  30s x5  30s x6  45sec x5 45s x5 30s x5 NT           Treadmill  NT     x6 x10m NT           Leg press  3pl 2x15  3pl 2x15  3pl x30  4pl x 30 2pl 20 NT 2pl x20 2 pl 2 x20 2 5 2x10  2 5 3x10   Flexionater             10 min         LAQ (cybex) 1 5pl 2x15    1 5 pl 2x15  1 5pl 2x15       4# x30 45# x 30  5# x30 3"   Ecc lower 2x10  2x10  2x10  2x10       30 30  30    side stepping   x5                    step downs 6in      2x10  2x10                                                                                                                                                                     Modalities     4/3 4/   4/   CP post   10 min 10 m 10m 10m   10m 10m NT  10m                               Assessment: Better performance of eccentric lowering  Adding in step downs to further improve control on steps      Plan: Continue per plan of care

## 2018-05-07 ENCOUNTER — OFFICE VISIT (OUTPATIENT)
Dept: PHYSICAL THERAPY | Age: 63
End: 2018-05-07
Payer: COMMERCIAL

## 2018-05-07 DIAGNOSIS — Z96.651 S/P TOTAL KNEE ARTHROPLASTY, RIGHT: Primary | ICD-10-CM

## 2018-05-07 PROCEDURE — 97110 THERAPEUTIC EXERCISES: CPT

## 2018-05-07 PROCEDURE — 97140 MANUAL THERAPY 1/> REGIONS: CPT

## 2018-05-07 NOTE — PROGRESS NOTES
Daily Note     Today's date: 2018  Patient name: Joe Daniel  : [id number]  MRN: [id number]  Referring provider: Sydney Mace MD  Dx:   Encounter Diagnosis     ICD-10-CM    1  S/P total knee arthroplasty, right Z96 651                   Subjective: No new complaints      Objective: See treatment diary below     Daily Treatment Diary      Manual  4/23 4/26 5/1 5/3 5/7 4/5 4/9 4/12 4/16  4/19   R knee flex/ext stretch 3x30" each 10 min 10m 10m 10m 10m 10m 10m 10m  10m   R knee patellar mobs 3x30" each direction 5 min 5m 5m 5m 5m 5m 5m 5m  5m                                                                                 Exercise Diary  4/23 4/26 5/1 5/3 5/7 4/5 4/9 4/12 4/16  4/19   Recumbent bike when able  12 min 13 min 12 m 12m 12m 12m 12m 12m 12m  10 min   Heel slide 10x  20 20 20 20 20 20         SAQ NT Cybex 2pl 2x15 5s x20 2# 5s x20 3# 5s x20 4# 5s x20 4# 5 sx 20 4# 5 s x30 5# 3x10  5# 5s x30   SLR flex/abd NT NT 1 5# 2x15 1 5# 2x15 NT NT 1 5# 2 x10 2# 2x10 2 5#  3x10  3# 2x10   Front/lat step ups  30 20 20 20 30 30 30 30 30  30   Mini squats  5s x20 5s x20 5s x20 5s x20 5s x20 5s x20 5sx20 5sx20 5sx20  5sx20   SLS - Foam  30s x5  30s x6  45sec x5 45sx5 1min x3 NT           Treadmill  NT     x6 x10m NT           Leg press B  3pl 2x15  3pl 2x15  3pl x30  4pl x 30 4 5pl 20 NT 2pl x20 2 pl 2 x20 2 5 2x10  2 5 3x10   Flexionater             10 min         LAQ (cybex) R 1 5pl 2x15    1 5 pl 2x15  1 5pl 2x15  1 5 2 x`15     4# x30 45# x 30  5# x30 3"   Ecc lower 2x10  2x10  2x10  2x10  2x10     30 30  30    side stepping   x5                    step downs 6in      2x10  2x10  2x10                                                                                                                                                                   Modalities     4/3 4/   4/   CP post   10 min 10 m 10m 10m   10m 10m NT  10m                               Assessment: Better performance of eccentric lowering  Adding in step downs to further improve control on steps      Plan: Continue per plan of care

## 2018-05-10 ENCOUNTER — OFFICE VISIT (OUTPATIENT)
Dept: PHYSICAL THERAPY | Age: 63
End: 2018-05-10
Payer: COMMERCIAL

## 2018-05-10 DIAGNOSIS — Z96.651 S/P TOTAL KNEE ARTHROPLASTY, RIGHT: Primary | ICD-10-CM

## 2018-05-10 PROCEDURE — 97110 THERAPEUTIC EXERCISES: CPT | Performed by: PHYSICAL THERAPIST

## 2018-05-10 PROCEDURE — 97140 MANUAL THERAPY 1/> REGIONS: CPT | Performed by: PHYSICAL THERAPIST

## 2018-05-10 NOTE — PROGRESS NOTES
Daily Note     Today's date: 5/10/2018  Patient name: Anne Summers  :   MRN: [id number]  Referring provider: Malcolm Valencia MD  Dx:   Encounter Diagnosis     ICD-10-CM    1  S/P total knee arthroplasty, right Z96 651                   Subjective: No new complaints      Objective: See treatment diary below     Daily Treatment Diary      Manual  4/23 4/26 5/1 5/3 5/7 5/10 4/9 4/12 4/16  4/19   R knee flex/ext stretch 3x30" each 10 min 10m 10m 10m 10m 10m 10m 10m  10m   R knee patellar mobs 3x30" each direction 5 min 5m 5m 5m 5m 5m 5m 5m  5m                                                                                 Exercise Diary  4/23 4/26 5/1 5/3 5/7 5/10 4/9 4/12 4/16  4/19   Recumbent bike when able  12 min 13 min 12 m 12m 12m 12m 12m 12m 12m  10 min   Heel slide 10x  20 20 20 20 NT 20         SAQ NT Cybex 2pl 2x15 5s x20 2# 5s x20 3# 5s x20 cybex 2pl 2 x10 4# 5 sx 20 4# 5 s x30 5# 3x10  5# 5s x30   SLR flex/abd NT NT 1 5# 2x15 1 5# 2x15 NT NT 1 5# 2 x10 2# 2x10 2 5#  3x10  3# 2x10   Front/lat step ups  30 20 20 20 30 30 30 30 30  30   Mini squats  5s x20 5s x20 5s x20 5s x20 5s x20 5s x20 5sx20 5sx20 5sx20  5sx20   SLS - Foam  30s x5  30s x6  45sec x5 45sx5 1min x3 1 min x4           Treadmill  NT     x6 x10m 10m           Leg press B  3pl 2x15  3pl 2x15  3pl x30  4pl x 30 4 5pl 20 4 5pl 2x10 2pl x20 2 pl 2 x20 2 5 2x10  2 5 3x10   Flexionater             10 min         LAQ (cybex) R 1 5pl 2x15    1 5 pl 2x15  1 5pl 2x15  1 5 2 x`15     4# x30 45# x 30  5# x30 3"   Ecc lower 2x10  2x10  2x10  2x10  2x10  2x15   30 30  30    side stepping   x5                    step downs 6in      2x10  2x10  2x10                                                                                                                                                                   Modalities    4/23 4/26 4/3 4/5   4/9 4/12 4/16  4/19   CP post   10 min 10 m 10m 10m   10m 10m NT  10m                         Assessment: Gait near normal       Plan: Continue per plan of care

## 2018-05-14 ENCOUNTER — OFFICE VISIT (OUTPATIENT)
Dept: PHYSICAL THERAPY | Age: 63
End: 2018-05-14
Payer: COMMERCIAL

## 2018-05-14 DIAGNOSIS — Z96.651 S/P TOTAL KNEE ARTHROPLASTY, RIGHT: Primary | ICD-10-CM

## 2018-05-14 PROCEDURE — 97140 MANUAL THERAPY 1/> REGIONS: CPT

## 2018-05-14 PROCEDURE — 97110 THERAPEUTIC EXERCISES: CPT

## 2018-05-14 NOTE — PROGRESS NOTES
Daily Note     Today's date: 2018  Patient name: Dominic Robles  :   MRN: [id number]  Referring provider: Tate Nguyen MD  Dx:   Encounter Diagnosis     ICD-10-CM    1  S/P total knee arthroplasty, right Z96 651                   Subjective: No new complaints      Objective: See treatment diary below     Daily Treatment Diary      Manual  4/23 4/26 5/1 5/3 5/7 5/10 5/14 4/12 4/16  4/19   R knee flex/ext stretch 3x30" each 10 min 10m 10m 10m 10m 10m 10m 10m  10m   R knee patellar mobs 3x30" each direction 5 min 5m 5m 5m 5m 5m 5m 5m  5m                                                                                 Exercise Diary  4/23 4/26 5/1 5/3 5/7 5/10 5/14 4/12 4/16  4/19   Recumbent bike when able  12 min 13 min 12 m 12m 12m 12m 12m 12m 12m  10 min   Heel slide 10x  20 20 20 20 NT 20         SAQ NT Cybex 2pl 2x15 5s x20 2# 5s x20 3# 5s x20 cybex 2pl 2 x10 Cybex 2pl 2x 10 4# 5 s x30 5# 3x10  5# 5s x30   SLR flex/abd NT NT 1 5# 2x15 1 5# 2x15 NT NT NT 2# 2x10 2 5#  3x10  3# 2x10   Front/lat step ups  30 20 20 20 30 30 30 30 30  30   Mini squats  5s x20 5s x20 5s x20 5s x20 5s x20 5s x20 5sx20 5sx20 5sx20  5sx20   SLS - Foam  30s x5  30s x6  45sec x5 45sx5 1min x3 1 min x4  1 min x5         Treadmill  NT     x6 x10m 10m           Leg press B  3pl 2x15  3pl 2x15  3pl x30  4pl x 30 4 5pl 20 4 5pl 2x10 2pl x20 2 pl 2 x20 2 5 2x10  2 5 3x10   Flexionater                      LAQ (cybex) R 1 5pl 2x15    1 5 pl 2x15  1 5pl 2x15  1 5 2 x`15  2pl 2x15  2pl 2 x15 4# x30 45# x 30  5# x30 3"   Ecc lower 2x10  2x10  2x10  2x10  2x10  2x15  2x15 30 30  30    side stepping   x5                    step downs 6in      2x10  2x10  2x10    2x10          lunges              2x10                                                                                                                                       Modalities    4/23 4/26 4/3 4/5   4/9 4/12 4/16  4/19   CP post   10 min 10 m 10m 10m   10m 10m NT  10m                               Assessment: Gait near normal   Added in lunges to promote function for ADL's      Plan: Continue per plan of care

## 2018-05-17 ENCOUNTER — OFFICE VISIT (OUTPATIENT)
Dept: PHYSICAL THERAPY | Age: 63
End: 2018-05-17
Payer: COMMERCIAL

## 2018-05-17 DIAGNOSIS — Z48.89 AFTERCARE FOLLOWING SURGERY: ICD-10-CM

## 2018-05-17 DIAGNOSIS — Z96.651 S/P TOTAL KNEE ARTHROPLASTY, RIGHT: Primary | ICD-10-CM

## 2018-05-17 PROCEDURE — 97110 THERAPEUTIC EXERCISES: CPT | Performed by: PHYSICAL THERAPIST

## 2018-05-17 PROCEDURE — G8979 MOBILITY GOAL STATUS: HCPCS | Performed by: PHYSICAL THERAPIST

## 2018-05-17 PROCEDURE — G8980 MOBILITY D/C STATUS: HCPCS | Performed by: PHYSICAL THERAPIST

## 2018-05-17 NOTE — PROGRESS NOTES
Daily Note     Today's date: 2018  Patient name: Dominic Robles  :   MRN: [id number]  Referring provider: Tate Nguyen MD  Dx:   No diagnosis found  Subjective: No new complaints      Objective: See treatment diary below  Daily Treatment Diary      Manual  4/23 4/26 5/1 5/3 5/7 5/10 5/14 4/12 4/16  4/19   R knee flex/ext stretch 3x30" each 10 min 10m 10m 10m 10m 10m 10m 10m  10m   R knee patellar mobs 3x30" each direction 5 min 5m 5m 5m 5m 5m 5m 5m  5m                                                                                 Exercise Diary  4/23 4/26 5/1 5/3 5/7 5/10 5/14 5/17 4/16  4/19   Recumbent bike when able  12 min 13 min 12 m 12m 12m 12m 12m 12m 12m  10 min   Heel slide 10x  20 20 20 20 NT 20         SAQ NT Cybex 2pl 2x15 5s x20 2# 5s x20 3# 5s x20 cybex 2pl 2 x10 Cybex 2pl 2x 10 Cybex 2 5pl 2x10 5# 3x10  5# 5s x30   SLR flex/abd NT NT 1 5# 2x15 1 5# 2x15 NT NT NT NT 2 5#  3x10  3# 2x10   Front/lat step ups  30 20 20 20 30 30 30 30 30  30   Mini squats  5s x20 5s x20 5s x20 5s x20 5s x20 5s x20 5sx20 5sx20 5sx20  5sx20   SLS - Foam  30s x5  30s x6  45sec x5 45sx5 1min x3 1 min x4  1 min x5  1 min x5       Treadmill  NT     x6 x10m 10m           Leg press B  3pl 2x15  3pl 2x15  3pl x30  4pl x 30 4 5pl 20 4 5pl 2x10 2pl x20 2 pl 2 x20 2 5 2x10  2 5 3x10   Flexionater                      LAQ (cybex) R 1 5pl 2x15    1 5 pl 2x15  1 5pl 2x15  1 5 2 x`15  2pl 2x15  2pl 2 x15 4# x30 45# x 30  5# x30 3"   Ecc lower 2x10  2x10  2x10  2x10  2x10  2x15  2x15 30 30  30    side stepping   x5                    step downs 6in      2x10  2x10  2x10    2x10          lunges              2x10                                                                                                                                       Modalities    4/23 4/26 4/3 4/5   4/9 4/12 4/16  4/19   CP post   10 min 10 m 10m 10m   10m 10m NT  10m                               Assessment:  All formal PT goals have been achieved  Plan: D/C to [facility]

## 2018-05-18 ENCOUNTER — APPOINTMENT (OUTPATIENT)
Dept: RADIOLOGY | Facility: MEDICAL CENTER | Age: 63
End: 2018-05-18
Payer: COMMERCIAL

## 2018-05-18 ENCOUNTER — OFFICE VISIT (OUTPATIENT)
Dept: OBGYN CLINIC | Facility: MEDICAL CENTER | Age: 63
End: 2018-05-18

## 2018-05-18 VITALS
DIASTOLIC BLOOD PRESSURE: 84 MMHG | HEART RATE: 89 BPM | SYSTOLIC BLOOD PRESSURE: 133 MMHG | HEIGHT: 60 IN | WEIGHT: 155 LBS | BODY MASS INDEX: 30.43 KG/M2

## 2018-05-18 DIAGNOSIS — Z96.651 S/P TOTAL KNEE ARTHROPLASTY, RIGHT: ICD-10-CM

## 2018-05-18 DIAGNOSIS — Z96.651 S/P TOTAL KNEE ARTHROPLASTY, RIGHT: Primary | ICD-10-CM

## 2018-05-18 PROCEDURE — 73560 X-RAY EXAM OF KNEE 1 OR 2: CPT

## 2018-05-18 PROCEDURE — 99024 POSTOP FOLLOW-UP VISIT: CPT | Performed by: ORTHOPAEDIC SURGERY

## 2018-05-18 NOTE — LETTER
May 18, 2018     Patient: Raul Hester   YOB: 1955   Date of Visit: 5/18/2018       To Whom it May Concern:    Sonu Trammell is under my professional care  She was seen in my office on 5/18/2018  She is able to return to work without restrictions as of 5/23/18  If you have any questions or concerns, please don't hesitate to call           Sincerely,          Filiberto Elliott MD        CC: No Recipients

## 2018-05-18 NOTE — PROGRESS NOTES
60 y o female returns today and is now 3 months s/p right TKA () and is doing rather well overall  She presents today without any assistive device and denies any calf pain  She has no pain which was resolved from her replacement  She wishes to RTW next week  Review of Systems  Review of systems negative unless otherwise specified in HPI    Past Medical History  Past Medical History:   Diagnosis Date    Anxiety     GERD (gastroesophageal reflux disease)     History of palpitations     Hypertension     Irritable bowel syndrome        Past Surgical History  Past Surgical History:   Procedure Laterality Date    CARDIAC CATHETERIZATION       SECTION      ESOPHAGOGASTRODUODENOSCOPY N/A 2016    Procedure: ESOPHAGOGASTRODUODENOSCOPY (EGD); Surgeon: Zaria Nazario MD;  Location: [facility]; Service:     KNEE ARTHROSCOPY      TN TOTAL KNEE ARTHROPLASTY Right 2018    Procedure: ARTHROPLASTY KNEE TOTAL;  Surgeon: Cosmo Coyne MD;  Location:  MAIN OR;  Service: Orthopedics       Current Medications  Current Outpatient Prescriptions on File Prior to Visit   Medication Sig Dispense Refill    acetaminophen (TYLENOL) 325 mg tablet Take 2 tablets (650 mg total) by mouth every 6 (six) hours 90 tablet 0    docusate sodium (COLACE) 100 mg capsule Take 1 capsule (100 mg total) by mouth 2 (two) times a day 20 capsule 0    losartan (COZAAR) 50 mg tablet Take 50 mg by mouth every evening        metoprolol succinate (TOPROL-XL) 50 mg 24 hr tablet Take 50 mg by mouth every evening Pt hasnt taken it for awhile        oxyCODONE (ROXICODONE) 5 mg immediate release tablet Take 1-2 tablets by mouth every 4-6 hours as needed for pain  60 tablet 0    pantoprazole (PROTONIX) 40 mg tablet Take 40 mg by mouth 2 (two) times a day        PARoxetine (PAXIL) 10 mg tablet Take 10 mg by mouth every evening        pravastatin (PRAVACHOL) 10 mg tablet Take 10 mg by mouth every evening        traMADol (ULTRAM) 50 mg tablet Take 1 tablet (50 mg total) by mouth every 6 (six) hours as needed for moderate pain 60 tablet 0     No current facility-administered medications on file prior to visit  Recent Labs [facility])    0  Lab Value Date/Time   HCT 29 6 (L) 03/02/2018 0521   HGB 9 7 (L) 03/02/2018 0521   WBC 11 18 (H) 03/02/2018 0521   INR 0 94 02/12/2018 1105   GLUCOSE 110 03/02/2018 0521   HGBA1C 5 9 02/19/2018 1101   HGBA1C 5 5 08/22/2015 1042         Physical exam  · General: Awake, Alert, Oriented  · Eyes: Pupils equal, round and reactive to light  · Heart: regular rate and rhythm  · Lungs: No audible wheezing  · Abdomen: soft    Right Knee: Well healed incision without signs of infection  Good STLT  Calf is supple and nont-tender without signs of DVT  Stable to varus/valgus stress   the patella tracks well ROM  0-110      Imaging  Right knee x-rays taken and reviewed today show:  Well fitting prosthesis without signs of lucency  No fractures or dislocation present  Procedure      Assessment/Plan:   60 y  o female 3 months s/p right total knee  Doing well  Maintain HEP  Total knee precautions  ICE and elevate  Activities as tolerated  WBAT  RTW 5/23  Re-check in 3 months (may evaluate her left knee at that time)

## 2018-08-14 ENCOUNTER — APPOINTMENT (OUTPATIENT)
Dept: LAB | Age: 63
End: 2018-08-14

## 2018-08-14 ENCOUNTER — TRANSCRIBE ORDERS (OUTPATIENT)
Dept: ADMINISTRATIVE | Age: 63
End: 2018-08-14

## 2018-08-14 DIAGNOSIS — Z00.8 HEALTH EXAMINATION IN POPULATION SURVEY: Primary | ICD-10-CM

## 2018-08-14 DIAGNOSIS — Z00.8 HEALTH EXAMINATION IN POPULATION SURVEY: ICD-10-CM

## 2018-08-14 LAB
CHOLEST SERPL-MCNC: 229 MG/DL (ref 50–200)
EST. AVERAGE GLUCOSE BLD GHB EST-MCNC: 120 MG/DL
HBA1C MFR BLD: 5.8 % (ref 4.2–6.3)
HDLC SERPL-MCNC: 54 MG/DL (ref 40–60)
LDLC SERPL CALC-MCNC: 153 MG/DL (ref 0–100)
NONHDLC SERPL-MCNC: 175 MG/DL
TRIGL SERPL-MCNC: 108 MG/DL

## 2018-08-14 PROCEDURE — 83036 HEMOGLOBIN GLYCOSYLATED A1C: CPT

## 2018-08-14 PROCEDURE — 36415 COLL VENOUS BLD VENIPUNCTURE: CPT

## 2018-08-14 PROCEDURE — 80061 LIPID PANEL: CPT

## 2018-09-19 ENCOUNTER — OFFICE VISIT (OUTPATIENT)
Dept: OBGYN CLINIC | Facility: HOSPITAL | Age: 63
End: 2018-09-19
Payer: COMMERCIAL

## 2018-09-19 ENCOUNTER — HOSPITAL ENCOUNTER (OUTPATIENT)
Dept: RADIOLOGY | Facility: HOSPITAL | Age: 63
Discharge: HOME | End: 2018-09-19
Attending: ORTHOPAEDIC SURGERY
Payer: COMMERCIAL

## 2018-09-19 VITALS
HEIGHT: 60 IN | HEART RATE: 60 BPM | DIASTOLIC BLOOD PRESSURE: 84 MMHG | SYSTOLIC BLOOD PRESSURE: 137 MMHG | BODY MASS INDEX: 30.43 KG/M2 | WEIGHT: 155 LBS

## 2018-09-19 DIAGNOSIS — M25.561 CHRONIC PAIN OF RIGHT KNEE: ICD-10-CM

## 2018-09-19 DIAGNOSIS — Z47.1 AFTERCARE FOLLOWING RIGHT KNEE JOINT REPLACEMENT SURGERY: Primary | ICD-10-CM

## 2018-09-19 DIAGNOSIS — Z47.1 AFTERCARE FOLLOWING RIGHT KNEE JOINT REPLACEMENT SURGERY: ICD-10-CM

## 2018-09-19 DIAGNOSIS — Z96.651 AFTERCARE FOLLOWING RIGHT KNEE JOINT REPLACEMENT SURGERY: Primary | ICD-10-CM

## 2018-09-19 DIAGNOSIS — Z96.651 AFTERCARE FOLLOWING RIGHT KNEE JOINT REPLACEMENT SURGERY: ICD-10-CM

## 2018-09-19 DIAGNOSIS — G89.29 CHRONIC PAIN OF RIGHT KNEE: ICD-10-CM

## 2018-09-19 PROCEDURE — 99213 OFFICE O/P EST LOW 20 MIN: CPT | Performed by: ORTHOPAEDIC SURGERY

## 2018-09-19 PROCEDURE — 73560 X-RAY EXAM OF KNEE 1 OR 2: CPT

## 2018-09-19 NOTE — PROGRESS NOTES
58 y o female presents for evaluation  It has been 6 months since she underwent right total knee replacement, describes very little pain in the right knee  Review of Systems  Review of systems negative unless otherwise specified in HPI    Past Medical History  Past Medical History:   Diagnosis Date    Anxiety     last assessed 8/24/15    Arthralgia of right knee     last assessed 5/27/14    Chronic interstitial cystitis     GERD (gastroesophageal reflux disease)     History of palpitations     Hypertension     Hypokalemia     last assessed 3/7/13    Irritable bowel syndrome     Osteoarthritis     last assessed 6/13/12    Osteopenia     last assessed 3/21/13    Postmenopausal atrophic vaginitis     last assessed 3/21/13    Stress incontinence     Uterine leiomyoma     resolved 1997       Past Surgical History  Past Surgical History:   Procedure Laterality Date    BREAST SURGERY      puncture aspiration of cyst, left, resolved 2002   [address]    daughter    COLONOSCOPY      complete, resolved 2005    ESOPHAGOGASTRODUODENOSCOPY N/A 8/11/2016    Procedure: ESOPHAGOGASTRODUODENOSCOPY (EGD); Surgeon: Martha Murphy MD;  Location: [facility];   Service:   for Maico's esophagitis    KNEE ARTHROSCOPY  2011    MT TOTAL KNEE ARTHROPLASTY Right 2/28/2018    Procedure: ARTHROPLASTY KNEE TOTAL;  Surgeon: Zane Hansen MD;  Location:  MAIN OR;  Service: Orthopedics  resolved 2010 per [employer]    VAGINAL HYSTERECTOMY      fibroids, resolved 1997       Current Medications  Current Outpatient Prescriptions on File Prior to Visit   Medication Sig Dispense Refill    acetaminophen (TYLENOL) 325 mg tablet Take 2 tablets (650 mg total) by mouth every 6 (six) hours 90 tablet 0    losartan (COZAAR) 50 mg tablet Take 50 mg by mouth every evening        metoprolol succinate (TOPROL-XL) 50 mg 24 hr tablet Take 50 mg by mouth every evening Pt hasnt taken it for awhile  pantoprazole (PROTONIX) 40 mg tablet Take 40 mg by mouth 2 (two) times a day   PARoxetine (PAXIL) 10 mg tablet Take 10 mg by mouth every evening        pravastatin (PRAVACHOL) 10 mg tablet Take 10 mg by mouth every evening         No current facility-administered medications on file prior to visit  Recent Labs [facility])    0  Lab Value Date/Time   HCT 29 6 (L) 03/02/2018 0521   HGB 9 7 (L) 03/02/2018 0521   WBC 11 18 (H) 03/02/2018 0521   INR 0 94 02/12/2018 1105   HGBA1C 5 8 08/14/2018 1001   HGBA1C 5 5 08/22/2015 1042         Physical exam  · General: Awake, Alert, Oriented  · Eyes: Pupils equal, round and reactive to light  · Heart: regular rate and rhythm  · Lungs: No audible wheezing  · Abdomen: soft  Gait pattern is without antalgia  Breathing is nonlabored  Right hip moves well  Right thighs devoid of atrophy right knee has a healed anterior incision  She is neutral in its alignment  Extension is full flexion is excellent  There is very little patellofemoral chatter  There is no mid flexion valgus instability  There is no tenderness the patient the proximal medial tibia  Calf compartments are soft and supple  Toes are warm, sensate, mobile  Imaging  I have personally reviewed x-rays right knee my interpretation is as follows:  Views of the right knee show total knee implant in satisfactory position    Procedure  None indicated or performed today    Assessment/Plan:   58 y  o female who is now 6 months removed from right total knee replacement, as well clinical radiographic exam   She understands antibiotic prophylaxis indicated prior invasive procedures for the next 1-3/4 years    I would welcome the opportunity see this patient back in the office in 6 months time, this include x-rays two views right knee upon arrival for 1 year checkup

## 2018-10-17 ENCOUNTER — TELEPHONE (OUTPATIENT)
Dept: FAMILY MEDICINE CLINIC | Facility: CLINIC | Age: 63
End: 2018-10-17

## 2018-10-17 DIAGNOSIS — Z12.39 SCREENING FOR BREAST CANCER: Primary | ICD-10-CM

## 2018-12-11 ENCOUNTER — OFFICE VISIT (OUTPATIENT)
Dept: FAMILY MEDICINE CLINIC | Facility: CLINIC | Age: 63
End: 2018-12-11
Payer: COMMERCIAL

## 2018-12-11 VITALS
TEMPERATURE: 97 F | DIASTOLIC BLOOD PRESSURE: 80 MMHG | BODY MASS INDEX: 31.61 KG/M2 | HEART RATE: 79 BPM | HEIGHT: 60 IN | SYSTOLIC BLOOD PRESSURE: 142 MMHG | OXYGEN SATURATION: 98 % | WEIGHT: 161 LBS | RESPIRATION RATE: 16 BRPM

## 2018-12-11 DIAGNOSIS — I10 ESSENTIAL HYPERTENSION: Primary | ICD-10-CM

## 2018-12-11 DIAGNOSIS — F32.A DEPRESSION, UNSPECIFIED DEPRESSION TYPE: ICD-10-CM

## 2018-12-11 DIAGNOSIS — Z12.11 SCREEN FOR COLON CANCER: ICD-10-CM

## 2018-12-11 DIAGNOSIS — Z13.820 SCREENING FOR OSTEOPOROSIS: ICD-10-CM

## 2018-12-11 DIAGNOSIS — M19.90 OSTEOARTHRITIS, UNSPECIFIED OSTEOARTHRITIS TYPE, UNSPECIFIED SITE: ICD-10-CM

## 2018-12-11 DIAGNOSIS — D64.9 ANEMIA, UNSPECIFIED TYPE: ICD-10-CM

## 2018-12-11 DIAGNOSIS — K21.9 CHRONIC GERD: ICD-10-CM

## 2018-12-11 DIAGNOSIS — E78.5 DYSLIPIDEMIA: ICD-10-CM

## 2018-12-11 PROCEDURE — 3008F BODY MASS INDEX DOCD: CPT | Performed by: INTERNAL MEDICINE

## 2018-12-11 PROCEDURE — 99214 OFFICE O/P EST MOD 30 MIN: CPT | Performed by: INTERNAL MEDICINE

## 2018-12-11 PROCEDURE — 1036F TOBACCO NON-USER: CPT | Performed by: INTERNAL MEDICINE

## 2018-12-11 RX ORDER — PRAVASTATIN SODIUM 10 MG
10 TABLET ORAL EVERY EVENING
Qty: 90 TABLET | Refills: 1 | Status: SHIPPED | OUTPATIENT
Start: 2018-12-11 | End: 2019-11-07 | Stop reason: SDUPTHER

## 2018-12-11 RX ORDER — PAROXETINE 10 MG/1
10 TABLET, FILM COATED ORAL EVERY EVENING
Qty: 90 TABLET | Refills: 1 | Status: SHIPPED | OUTPATIENT
Start: 2018-12-11 | End: 2019-11-07 | Stop reason: SDUPTHER

## 2018-12-11 RX ORDER — METOPROLOL SUCCINATE 50 MG/1
50 TABLET, EXTENDED RELEASE ORAL EVERY EVENING
Qty: 90 TABLET | Refills: 1 | Status: SHIPPED | OUTPATIENT
Start: 2018-12-11 | End: 2019-11-07 | Stop reason: SDUPTHER

## 2018-12-11 RX ORDER — LOSARTAN POTASSIUM 50 MG/1
50 TABLET ORAL EVERY EVENING
Qty: 90 TABLET | Refills: 1 | Status: SHIPPED | OUTPATIENT
Start: 2018-12-11 | End: 2019-11-07 | Stop reason: SDUPTHER

## 2018-12-11 RX ORDER — PANTOPRAZOLE SODIUM 40 MG/1
40 TABLET, DELAYED RELEASE ORAL 2 TIMES DAILY
Qty: 180 TABLET | Refills: 1 | Status: SHIPPED | OUTPATIENT
Start: 2018-12-11 | End: 2019-11-07 | Stop reason: SDUPTHER

## 2018-12-11 NOTE — PROGRESS NOTES
Assessment/Plan:    No problem-specific Assessment & Plan notes found for this encounter  Diagnoses and all orders for this visit:    Essential hypertension  -     losartan (COZAAR) 50 mg tablet; Take 1 tablet (50 mg total) by mouth every evening  -     metoprolol succinate (TOPROL-XL) 50 mg 24 hr tablet; Take 1 tablet (50 mg total) by mouth every evening Pt hasnt taken it for awhile    Screen for colon cancer  -     Ambulatory referral to Colorectal Surgery; Future    Screening for osteoporosis  -     DXA bone density spine hip and pelvis; Future    Chronic GERD  -     pantoprazole (PROTONIX) 40 mg tablet; Take 1 tablet (40 mg total) by mouth 2 (two) times a day    Osteoarthritis, unspecified osteoarthritis type, unspecified site    Depression, unspecified depression type  -     PARoxetine (PAXIL) 10 mg tablet; Take 1 tablet (10 mg total) by mouth every evening    Dyslipidemia  -     pravastatin (PRAVACHOL) 10 mg tablet; Take 1 tablet (10 mg total) by mouth every evening    Anemia, unspecified type  -     CBC; Future          Subjective:      Patient ID: Whit Kenny is a 58 y o  female  Pt without complains  Denies uri  The following portions of the patient's history were reviewed and updated as appropriate:   She  has a past medical history of Anxiety; Arthralgia of right knee; Chronic interstitial cystitis; GERD (gastroesophageal reflux disease); History of palpitations; Hypertension; Hypokalemia; Irritable bowel syndrome; Osteoarthritis; Osteopenia; Postmenopausal atrophic vaginitis; Stress incontinence; and Uterine leiomyoma    She   Patient Active Problem List    Diagnosis Date Noted    Aftercare following right knee joint replacement surgery 09/19/2018    Aftercare following surgery 03/09/2018    S/P total knee arthroplasty, right 03/02/2018    Chronic GERD 01/03/2018    Bilateral knee pain 11/14/2017    Depression 02/22/2013    Dyslipidemia 06/13/2012    Hypertension 2012     She  has a past surgical history that includes  section (); Esophagogastroduodenoscopy (N/A, 2016); Knee arthroscopy (); Cardiac catheterization; pr total knee arthroplasty (Right, 2018); Breast surgery; Colonoscopy; and Vaginal hysterectomy  Her family history includes Cancer in her mother; Heart attack in her father; Heart attack (age of onset: 39) in her son; Heart disease in her father; Hypertension in her family; Lung cancer in her other; Ulcers in her family  She  reports that she has quit smoking  She has a 10 00 pack-year smoking history  She has never used smokeless tobacco  She reports that she drinks alcohol  She reports that she does not use drugs  Current Outpatient Prescriptions   Medication Sig Dispense Refill    losartan (COZAAR) 50 mg tablet Take 1 tablet (50 mg total) by mouth every evening 90 tablet 1    metoprolol succinate (TOPROL-XL) 50 mg 24 hr tablet Take 1 tablet (50 mg total) by mouth every evening Pt hasnt taken it for awhile 90 tablet 1    pantoprazole (PROTONIX) 40 mg tablet Take 1 tablet (40 mg total) by mouth 2 (two) times a day 180 tablet 1    PARoxetine (PAXIL) 10 mg tablet Take 1 tablet (10 mg total) by mouth every evening 90 tablet 1    pravastatin (PRAVACHOL) 10 mg tablet Take 1 tablet (10 mg total) by mouth every evening 90 tablet 1     No current facility-administered medications for this visit  No current outpatient prescriptions on file prior to visit  No current facility-administered medications on file prior to visit  She is allergic to bactrim [sulfamethoxazole-trimethoprim]; prednisone; and sulfa antibiotics       Review of Systems   Constitutional: Negative  HENT: Negative  Respiratory: Negative  Cardiovascular: Negative  Gastrointestinal: Negative            Objective:      /80 (BP Location: Left arm, Patient Position: Sitting, Cuff Size: Standard)   Pulse 79   Temp (!) 97 °F (36 1 °C) (Tympanic)   Resp 16   Ht 5' (1 524 m)   Wt 73 kg (161 lb)   SpO2 98%   BMI 31 44 kg/m²          Physical Exam   Constitutional: She appears well-developed and well-nourished  HENT:   Head: Normocephalic and atraumatic  Right Ear: External ear normal    Left Ear: External ear normal    Nose: Nose normal    Mouth/Throat: Oropharynx is clear and moist    Neck: Normal range of motion  Neck supple  Cardiovascular: Normal rate, regular rhythm and normal heart sounds  Pulmonary/Chest: Effort normal and breath sounds normal    Abdominal: Soft   Bowel sounds are normal    Genitourinary: Vagina normal

## 2019-03-13 DIAGNOSIS — Z47.1 AFTERCARE FOLLOWING RIGHT KNEE JOINT REPLACEMENT SURGERY: Primary | ICD-10-CM

## 2019-03-13 DIAGNOSIS — Z96.651 AFTERCARE FOLLOWING RIGHT KNEE JOINT REPLACEMENT SURGERY: Primary | ICD-10-CM

## 2019-04-09 ENCOUNTER — HOSPITAL ENCOUNTER (OUTPATIENT)
Dept: RADIOLOGY | Facility: HOSPITAL | Age: 64
Discharge: HOME | End: 2019-04-09
Attending: ORTHOPAEDIC SURGERY
Payer: COMMERCIAL

## 2019-04-09 ENCOUNTER — OFFICE VISIT (OUTPATIENT)
Dept: OBGYN CLINIC | Facility: HOSPITAL | Age: 64
End: 2019-04-09
Payer: COMMERCIAL

## 2019-04-09 VITALS
DIASTOLIC BLOOD PRESSURE: 89 MMHG | WEIGHT: 167 LBS | SYSTOLIC BLOOD PRESSURE: 155 MMHG | BODY MASS INDEX: 32.79 KG/M2 | HEIGHT: 60 IN

## 2019-04-09 DIAGNOSIS — Z47.1 AFTERCARE FOLLOWING RIGHT KNEE JOINT REPLACEMENT SURGERY: ICD-10-CM

## 2019-04-09 DIAGNOSIS — M25.561 CHRONIC PAIN OF RIGHT KNEE: ICD-10-CM

## 2019-04-09 DIAGNOSIS — G89.29 CHRONIC PAIN OF LEFT KNEE: ICD-10-CM

## 2019-04-09 DIAGNOSIS — Z96.651 AFTERCARE FOLLOWING RIGHT KNEE JOINT REPLACEMENT SURGERY: ICD-10-CM

## 2019-04-09 DIAGNOSIS — Z96.651 AFTERCARE FOLLOWING RIGHT KNEE JOINT REPLACEMENT SURGERY: Primary | ICD-10-CM

## 2019-04-09 DIAGNOSIS — M15.9 PRIMARY OSTEOARTHRITIS INVOLVING MULTIPLE JOINTS: ICD-10-CM

## 2019-04-09 DIAGNOSIS — M25.562 CHRONIC PAIN OF LEFT KNEE: ICD-10-CM

## 2019-04-09 DIAGNOSIS — Z47.1 AFTERCARE FOLLOWING RIGHT KNEE JOINT REPLACEMENT SURGERY: Primary | ICD-10-CM

## 2019-04-09 DIAGNOSIS — G89.29 CHRONIC PAIN OF RIGHT KNEE: ICD-10-CM

## 2019-04-09 PROCEDURE — 73560 X-RAY EXAM OF KNEE 1 OR 2: CPT

## 2019-04-09 PROCEDURE — 20610 DRAIN/INJ JOINT/BURSA W/O US: CPT | Performed by: ORTHOPAEDIC SURGERY

## 2019-04-09 PROCEDURE — 99213 OFFICE O/P EST LOW 20 MIN: CPT | Performed by: ORTHOPAEDIC SURGERY

## 2019-04-09 RX ORDER — BETAMETHASONE SODIUM PHOSPHATE AND BETAMETHASONE ACETATE 3; 3 MG/ML; MG/ML
12 INJECTION, SUSPENSION INTRA-ARTICULAR; INTRALESIONAL; INTRAMUSCULAR; SOFT TISSUE
Status: COMPLETED | OUTPATIENT
Start: 2019-04-09 | End: 2019-04-09

## 2019-04-09 RX ADMIN — BETAMETHASONE SODIUM PHOSPHATE AND BETAMETHASONE ACETATE 12 MG: 3; 3 INJECTION, SUSPENSION INTRA-ARTICULAR; INTRALESIONAL; INTRAMUSCULAR; SOFT TISSUE at 16:24

## 2019-04-23 ENCOUNTER — OFFICE VISIT (OUTPATIENT)
Dept: OBGYN CLINIC | Facility: HOSPITAL | Age: 64
End: 2019-04-23
Payer: COMMERCIAL

## 2019-04-23 VITALS
BODY MASS INDEX: 32.67 KG/M2 | HEART RATE: 73 BPM | WEIGHT: 166.4 LBS | HEIGHT: 60 IN | DIASTOLIC BLOOD PRESSURE: 81 MMHG | SYSTOLIC BLOOD PRESSURE: 168 MMHG

## 2019-04-23 DIAGNOSIS — M23.92 ACUTE INTERNAL DERANGEMENT OF LEFT KNEE: ICD-10-CM

## 2019-04-23 DIAGNOSIS — M25.562 ACUTE PAIN OF LEFT KNEE: Primary | ICD-10-CM

## 2019-04-23 PROCEDURE — 99213 OFFICE O/P EST LOW 20 MIN: CPT | Performed by: ORTHOPAEDIC SURGERY

## 2019-05-07 ENCOUNTER — HOSPITAL ENCOUNTER (OUTPATIENT)
Dept: RADIOLOGY | Facility: HOSPITAL | Age: 64
Discharge: HOME | End: 2019-05-07
Payer: COMMERCIAL

## 2019-05-07 DIAGNOSIS — M25.562 ACUTE PAIN OF LEFT KNEE: ICD-10-CM

## 2019-05-07 PROCEDURE — 73721 MRI JNT OF LWR EXTRE W/O DYE: CPT

## 2019-05-09 ENCOUNTER — OFFICE VISIT (OUTPATIENT)
Dept: OBGYN CLINIC | Facility: HOSPITAL | Age: 64
End: 2019-05-09
Payer: COMMERCIAL

## 2019-05-09 VITALS
HEART RATE: 65 BPM | DIASTOLIC BLOOD PRESSURE: 80 MMHG | HEIGHT: 60 IN | BODY MASS INDEX: 32.59 KG/M2 | SYSTOLIC BLOOD PRESSURE: 149 MMHG | WEIGHT: 166 LBS

## 2019-05-09 DIAGNOSIS — M25.562 ACUTE PAIN OF LEFT KNEE: ICD-10-CM

## 2019-05-09 DIAGNOSIS — S83.232A COMPLEX TEAR OF MEDIAL MENISCUS OF LEFT KNEE AS CURRENT INJURY, INITIAL ENCOUNTER: Primary | ICD-10-CM

## 2019-05-09 DIAGNOSIS — M17.12 PRIMARY OSTEOARTHRITIS OF LEFT KNEE: ICD-10-CM

## 2019-05-09 PROCEDURE — 99213 OFFICE O/P EST LOW 20 MIN: CPT | Performed by: ORTHOPAEDIC SURGERY

## 2019-05-09 RX ORDER — CEFAZOLIN SODIUM 2 G/50ML
2000 SOLUTION INTRAVENOUS ONCE
Status: CANCELLED | OUTPATIENT
Start: 2019-05-09 | End: 2019-05-09

## 2019-11-03 ENCOUNTER — APPOINTMENT (EMERGENCY)
Dept: RADIOLOGY | Facility: HOSPITAL | Age: 64
End: 2019-11-03
Payer: COMMERCIAL

## 2019-11-03 ENCOUNTER — HOSPITAL ENCOUNTER (EMERGENCY)
Facility: HOSPITAL | Age: 64
Discharge: HOME | End: 2019-11-03
Attending: EMERGENCY MEDICINE
Payer: COMMERCIAL

## 2019-11-03 VITALS
OXYGEN SATURATION: 96 % | SYSTOLIC BLOOD PRESSURE: 151 MMHG | DIASTOLIC BLOOD PRESSURE: 70 MMHG | HEART RATE: 60 BPM | TEMPERATURE: 97.9 F | RESPIRATION RATE: 18 BRPM

## 2019-11-03 DIAGNOSIS — R00.2 PALPITATIONS: Primary | ICD-10-CM

## 2019-11-03 LAB
ALBUMIN SERPL BCP-MCNC: 3.8 G/DL (ref 3.5–5)
ALP SERPL-CCNC: 88 U/L (ref 46–116)
ALT SERPL W P-5'-P-CCNC: 19 U/L (ref 12–78)
ANION GAP SERPL CALCULATED.3IONS-SCNC: 6 MMOL/L (ref 4–13)
AST SERPL W P-5'-P-CCNC: 13 U/L (ref 5–45)
BASOPHILS # BLD AUTO: 0.07 THOUSANDS/ΜL (ref 0–0.1)
BASOPHILS NFR BLD AUTO: 1 % (ref 0–1)
BILIRUB SERPL-MCNC: 0.47 MG/DL (ref 0.2–1)
BUN SERPL-MCNC: 18 MG/DL (ref 5–25)
CALCIUM SERPL-MCNC: 9.5 MG/DL (ref 8.3–10.1)
CHLORIDE SERPL-SCNC: 105 MMOL/L (ref 100–108)
CO2 SERPL-SCNC: 30 MMOL/L (ref 21–32)
CREAT SERPL-MCNC: 0.81 MG/DL (ref 0.6–1.3)
EOSINOPHIL # BLD AUTO: 0.18 THOUSAND/ΜL (ref 0–0.61)
EOSINOPHIL NFR BLD AUTO: 1 % (ref 0–6)
ERYTHROCYTE [DISTWIDTH] IN BLOOD BY AUTOMATED COUNT: 13.4 % (ref 11.6–15.1)
GFR SERPL CREATININE-BSD FRML MDRD: 76 ML/MIN/1.73SQ M
GLUCOSE SERPL-MCNC: 96 MG/DL (ref 65–140)
HCT VFR BLD AUTO: 45.9 % (ref 34.8–46.1)
HGB BLD-MCNC: 14.2 G/DL (ref 11.5–15.4)
IMM GRANULOCYTES # BLD AUTO: 0.07 THOUSAND/UL (ref 0–0.2)
IMM GRANULOCYTES NFR BLD AUTO: 1 % (ref 0–2)
LYMPHOCYTES # BLD AUTO: 3.34 THOUSANDS/ΜL (ref 0.6–4.47)
LYMPHOCYTES NFR BLD AUTO: 25 % (ref 14–44)
MCH RBC QN AUTO: 27.8 PG (ref 26.8–34.3)
MCHC RBC AUTO-ENTMCNC: 30.9 G/DL (ref 31.4–37.4)
MCV RBC AUTO: 90 FL (ref 82–98)
MONOCYTES # BLD AUTO: 0.73 THOUSAND/ΜL (ref 0.17–1.22)
MONOCYTES NFR BLD AUTO: 5 % (ref 4–12)
NEUTROPHILS # BLD AUTO: 9.01 THOUSANDS/ΜL (ref 1.85–7.62)
NEUTS SEG NFR BLD AUTO: 67 % (ref 43–75)
NRBC BLD AUTO-RTO: 0 /100 WBCS
PLATELET # BLD AUTO: 299 THOUSANDS/UL (ref 149–390)
PMV BLD AUTO: 10.4 FL (ref 8.9–12.7)
POTASSIUM SERPL-SCNC: 3.7 MMOL/L (ref 3.5–5.3)
PROT SERPL-MCNC: 8 G/DL (ref 6.4–8.2)
RBC # BLD AUTO: 5.11 MILLION/UL (ref 3.81–5.12)
SODIUM SERPL-SCNC: 141 MMOL/L (ref 136–145)
TROPONIN I SERPL-MCNC: <0.02 NG/ML
TSH SERPL DL<=0.05 MIU/L-ACNC: 2.05 UIU/ML (ref 0.36–3.74)
WBC # BLD AUTO: 13.4 THOUSAND/UL (ref 4.31–10.16)

## 2019-11-03 PROCEDURE — 36415 COLL VENOUS BLD VENIPUNCTURE: CPT

## 2019-11-03 PROCEDURE — 99285 EMERGENCY DEPT VISIT HI MDM: CPT

## 2019-11-03 PROCEDURE — 85025 COMPLETE CBC W/AUTO DIFF WBC: CPT | Performed by: EMERGENCY MEDICINE

## 2019-11-03 PROCEDURE — 71046 X-RAY EXAM CHEST 2 VIEWS: CPT

## 2019-11-03 PROCEDURE — 93005 ELECTROCARDIOGRAM TRACING: CPT

## 2019-11-03 PROCEDURE — 99285 EMERGENCY DEPT VISIT HI MDM: CPT | Performed by: EMERGENCY MEDICINE

## 2019-11-03 PROCEDURE — 80053 COMPREHEN METABOLIC PANEL: CPT | Performed by: EMERGENCY MEDICINE

## 2019-11-03 PROCEDURE — 84484 ASSAY OF TROPONIN QUANT: CPT | Performed by: EMERGENCY MEDICINE

## 2019-11-03 PROCEDURE — 84443 ASSAY THYROID STIM HORMONE: CPT | Performed by: STUDENT IN AN ORGANIZED HEALTH CARE EDUCATION/TRAINING PROGRAM

## 2019-11-04 ENCOUNTER — TELEPHONE (OUTPATIENT)
Dept: FAMILY MEDICINE CLINIC | Facility: CLINIC | Age: 64
End: 2019-11-04

## 2019-11-04 NOTE — ED ATTENDING ATTESTATION
11/3/2019  Silva Causey DO, saw and evaluated the patient  I have discussed the patient with the resident/non-physician practitioner and agree with the resident's/non-physician practitioner's findings, Plan of Care, and MDM as documented in the resident's/non-physician practitioner's note, except where noted  All available labs and Radiology studies were reviewed  I was present for key portions of any procedure(s) performed by the resident/non-physician practitioner and I was immediately available to provide assistance  At this point I agree with the current assessment done in the Emergency Department  I have conducted an independent evaluation of this patient a history and physical is as follows:      70 yo female c/o palpitations x 2 weeks  More frequent today  No CP/SOB, lightheadedness, syncope, abd pain, n/v  Imp: palpitations likely PVCs plan: cardiac eval, TSH  Likely d/c home        ED Course         Critical Care Time  Procedures

## 2019-11-04 NOTE — TELEPHONE ENCOUNTER
Left message for patient to see how she was feeling in addition to schedule an emergency room follow up appointment

## 2019-11-04 NOTE — DISCHARGE INSTRUCTIONS
Please follow up with Cardiology if her symptoms continue  Return to the emergency department if he develop any new or otherwise concerning symptoms such as loss of consciousness, chest pain, shortness of breath, or any increase in the frequency of the palpitations

## 2019-11-05 LAB
ATRIAL RATE: 65 BPM
P AXIS: 80 DEGREES
PR INTERVAL: 148 MS
QRS AXIS: 7 DEGREES
QRSD INTERVAL: 84 MS
QT INTERVAL: 392 MS
QTC INTERVAL: 407 MS
T WAVE AXIS: 52 DEGREES
VENTRICULAR RATE: 65 BPM

## 2019-11-05 PROCEDURE — 93010 ELECTROCARDIOGRAM REPORT: CPT | Performed by: INTERNAL MEDICINE

## 2019-11-07 ENCOUNTER — TELEPHONE (OUTPATIENT)
Dept: CARDIOLOGY CLINIC | Facility: CLINIC | Age: 64
End: 2019-11-07

## 2019-11-07 ENCOUNTER — OFFICE VISIT (OUTPATIENT)
Dept: CARDIOLOGY CLINIC | Facility: CLINIC | Age: 64
End: 2019-11-07
Payer: COMMERCIAL

## 2019-11-07 ENCOUNTER — OFFICE VISIT (OUTPATIENT)
Dept: FAMILY MEDICINE CLINIC | Facility: CLINIC | Age: 64
End: 2019-11-07
Payer: COMMERCIAL

## 2019-11-07 VITALS
DIASTOLIC BLOOD PRESSURE: 82 MMHG | BODY MASS INDEX: 33.38 KG/M2 | WEIGHT: 170 LBS | SYSTOLIC BLOOD PRESSURE: 140 MMHG | RESPIRATION RATE: 16 BRPM | OXYGEN SATURATION: 97 % | HEART RATE: 60 BPM | TEMPERATURE: 98.9 F | HEIGHT: 60 IN

## 2019-11-07 VITALS
HEART RATE: 56 BPM | DIASTOLIC BLOOD PRESSURE: 64 MMHG | WEIGHT: 171 LBS | HEIGHT: 60 IN | BODY MASS INDEX: 33.57 KG/M2 | SYSTOLIC BLOOD PRESSURE: 130 MMHG

## 2019-11-07 DIAGNOSIS — Z12.11 SCREEN FOR COLON CANCER: ICD-10-CM

## 2019-11-07 DIAGNOSIS — R00.2 PALPITATIONS: ICD-10-CM

## 2019-11-07 DIAGNOSIS — I10 ESSENTIAL HYPERTENSION: ICD-10-CM

## 2019-11-07 DIAGNOSIS — K21.9 CHRONIC GERD: ICD-10-CM

## 2019-11-07 DIAGNOSIS — Z12.39 SCREENING FOR BREAST CANCER: ICD-10-CM

## 2019-11-07 DIAGNOSIS — E78.5 DYSLIPIDEMIA: ICD-10-CM

## 2019-11-07 DIAGNOSIS — I10 ESSENTIAL HYPERTENSION: Primary | ICD-10-CM

## 2019-11-07 DIAGNOSIS — I49.1 PAC (PREMATURE ATRIAL CONTRACTION): Primary | ICD-10-CM

## 2019-11-07 DIAGNOSIS — F32.A DEPRESSION, UNSPECIFIED DEPRESSION TYPE: ICD-10-CM

## 2019-11-07 DIAGNOSIS — Z23 ENCOUNTER FOR IMMUNIZATION: ICD-10-CM

## 2019-11-07 PROCEDURE — 99214 OFFICE O/P EST MOD 30 MIN: CPT | Performed by: INTERNAL MEDICINE

## 2019-11-07 PROCEDURE — 1101F PT FALLS ASSESS-DOCD LE1/YR: CPT | Performed by: INTERNAL MEDICINE

## 2019-11-07 PROCEDURE — 90471 IMMUNIZATION ADMIN: CPT

## 2019-11-07 PROCEDURE — 90732 PPSV23 VACC 2 YRS+ SUBQ/IM: CPT

## 2019-11-07 PROCEDURE — 99244 OFF/OP CNSLTJ NEW/EST MOD 40: CPT | Performed by: INTERNAL MEDICINE

## 2019-11-07 RX ORDER — METOPROLOL SUCCINATE 50 MG/1
TABLET, EXTENDED RELEASE ORAL
Qty: 135 TABLET | Refills: 1 | Status: SHIPPED | OUTPATIENT
Start: 2019-11-07 | End: 2020-09-02 | Stop reason: SDUPTHER

## 2019-11-07 RX ORDER — PANTOPRAZOLE SODIUM 40 MG/1
40 TABLET, DELAYED RELEASE ORAL DAILY
Qty: 90 TABLET | Refills: 1 | Status: SHIPPED | OUTPATIENT
Start: 2019-11-07 | End: 2020-09-02 | Stop reason: SDUPTHER

## 2019-11-07 RX ORDER — PAROXETINE 10 MG/1
10 TABLET, FILM COATED ORAL 2 TIMES DAILY
Qty: 180 TABLET | Refills: 1 | Status: SHIPPED | OUTPATIENT
Start: 2019-11-07 | End: 2020-09-02 | Stop reason: SDUPTHER

## 2019-11-07 RX ORDER — ROSUVASTATIN CALCIUM 10 MG/1
10 TABLET, COATED ORAL DAILY
Qty: 90 TABLET | Refills: 3 | Status: SHIPPED | OUTPATIENT
Start: 2019-11-07 | End: 2020-09-02 | Stop reason: SDUPTHER

## 2019-11-07 RX ORDER — LOSARTAN POTASSIUM 50 MG/1
50 TABLET ORAL EVERY EVENING
Qty: 90 TABLET | Refills: 1 | Status: SHIPPED | OUTPATIENT
Start: 2019-11-07 | End: 2020-09-02 | Stop reason: SDUPTHER

## 2019-11-07 RX ORDER — PRAVASTATIN SODIUM 10 MG
10 TABLET ORAL EVERY EVENING
Qty: 90 TABLET | Refills: 1 | Status: SHIPPED | OUTPATIENT
Start: 2019-11-07 | End: 2019-11-07

## 2019-11-07 NOTE — PROGRESS NOTES
Assessment/Plan:         Diagnoses and all orders for this visit:    PAC (premature atrial contraction)  Comments:  increase pac    Screening for breast cancer  -     Mammo screening bilateral w cad; Future    Screen for colon cancer  -     Ambulatory referral to Colorectal Surgery; Future    Essential hypertension  Comments:  increase metoprolol  Orders:  -     metoprolol succinate (TOPROL-XL) 50 mg 24 hr tablet; Take 1/2 tab in am and one tab in pm(total 75mg daily)  -     losartan (COZAAR) 50 mg tablet; Take 1 tablet (50 mg total) by mouth every evening    Essential hypertension  -     metoprolol succinate (TOPROL-XL) 50 mg 24 hr tablet; Take 1/2 tab in am and one tab in pm(total 75mg daily)  -     losartan (COZAAR) 50 mg tablet; Take 1 tablet (50 mg total) by mouth every evening    Chronic GERD  -     pantoprazole (PROTONIX) 40 mg tablet; Take 1 tablet (40 mg total) by mouth daily    Depression, unspecified depression type  -     PARoxetine (PAXIL) 10 mg tablet; Take 1 tablet (10 mg total) by mouth 2 (two) times a day    Dyslipidemia  -     Discontinue: pravastatin (PRAVACHOL) 10 mg tablet; Take 1 tablet (10 mg total) by mouth every evening  -     Lipid panel; Future  -     Comprehensive metabolic panel; Future    Encounter for immunization  -     PNEUMOCOCCAL POLYSACCHARIDE VACCINE 23-VALENT =>3YO SQ IM          Subjective:      Patient ID: Lul Morris is a 71 y o  female  Pt relates she was getting constant palp Sunday  It scarred her  She decreased intake of diet coke and felt it helped but this past Sunday she had constatnt palpitation  She went to er and thyroid was okay  Her electrolytes were okay  She took extra b-blocker on Sunday without relief  She was getting palp q 6 to 20 seconds  +strong family hx of cardiac          The following portions of the patient's history were reviewed and updated as appropriate: She  has a past medical history of Anxiety, Arthralgia of right knee, Chronic interstitial cystitis, GERD (gastroesophageal reflux disease), High cholesterol, History of palpitations, Hypertension, Hypokalemia, Irritable bowel syndrome, Osteoarthritis, Osteopenia, Postmenopausal atrophic vaginitis, Stress incontinence, and Uterine leiomyoma  She   Patient Active Problem List    Diagnosis Date Noted    Palpitations 2019    Complex tear of medial meniscus of left knee as current injury 2019    Acute internal derangement of left knee 2019    Aftercare following right knee joint replacement surgery 2018    Aftercare following surgery 2018    S/P total knee arthroplasty, right 2018    Chronic GERD 2018    Primary osteoarthritis of left knee 2017    Acute pain of left knee 2017    Depression 2013    Dyslipidemia 2012    Hypertension 2012     She  has a past surgical history that includes  section (); Esophagogastroduodenoscopy (N/A, 2016); Knee arthroscopy (); Cardiac catheterization; pr total knee arthroplasty (Right, 2018); Breast surgery; Colonoscopy; and Vaginal hysterectomy  Her family history includes Cancer in her mother; Heart attack in her father; Heart attack (age of onset: 39) in her son; Heart disease in her father; Hypertension in her family, father, and mother; Lung cancer in her other; Ulcers in her family  She  reports that she has quit smoking  She has a 10 00 pack-year smoking history  She has never used smokeless tobacco  She reports that she drinks alcohol  She reports that she does not use drugs    Current Outpatient Medications   Medication Sig Dispense Refill    losartan (COZAAR) 50 mg tablet Take 1 tablet (50 mg total) by mouth every evening 90 tablet 1    metoprolol succinate (TOPROL-XL) 50 mg 24 hr tablet Take 1/2 tab in am and one tab in pm(total 75mg daily) 135 tablet 1    pantoprazole (PROTONIX) 40 mg tablet Take 1 tablet (40 mg total) by mouth daily 90 tablet 1    PARoxetine (PAXIL) 10 mg tablet Take 1 tablet (10 mg total) by mouth 2 (two) times a day 180 tablet 1    rosuvastatin (CRESTOR) 10 MG tablet Take 1 tablet (10 mg total) by mouth daily 90 tablet 3     No current facility-administered medications for this visit  No current outpatient medications on file prior to visit  No current facility-administered medications on file prior to visit  She is allergic to bactrim [sulfamethoxazole-trimethoprim] and sulfa antibiotics       Review of Systems   Constitutional: Negative  HENT: Negative  Respiratory: Negative  Negative for shortness of breath  Cardiovascular: Positive for palpitations  Negative for chest pain and leg swelling  Neurological: Negative for headaches  Objective:      /82 (BP Location: Right arm, Patient Position: Sitting, Cuff Size: Standard)   Pulse 60   Temp 98 9 °F (37 2 °C) (Temporal)   Resp 16   Ht 4' 11 5" (1 511 m)   Wt 77 1 kg (170 lb)   SpO2 97%   BMI 33 76 kg/m²          Physical Exam   Constitutional: She appears well-developed and well-nourished  No distress  HENT:   Head: Normocephalic and atraumatic  Right Ear: External ear normal    Left Ear: External ear normal    Nose: Nose normal    Mouth/Throat: Oropharynx is clear and moist  No oropharyngeal exudate  Neck: Normal range of motion  Neck supple  Pulmonary/Chest: Effort normal and breath sounds normal  No respiratory distress  She has no wheezes  She has no rales  Abdominal: Soft  Bowel sounds are normal  She exhibits no distension  There is no tenderness  There is no guarding  Lymphadenopathy:     She has no cervical adenopathy  Skin: She is not diaphoretic

## 2019-11-07 NOTE — PATIENT INSTRUCTIONS
Discussed to further eval with card studies  She will see dr Lenny Roach later today and await rec  Told her increase metoprolo to 25mg am and 50mg pm   She said becomes fatigued with 75 but will to try in split dose

## 2019-11-07 NOTE — PATIENT INSTRUCTIONS
You are having frequent PVCs at times  He must do your best to avoid these  Stimulants, over-the-counter decongestants caffeine etc   Must be strictly avoided  We can give you medicine to make these go away however they are dangerous  For the time being, as discussed take the extra metoprolol, 25 milligrams in the morning 50 in the evening  If this is unsuccessful please be certain to call here    You are at moderate risk of coronary disease  He need a better cholesterol pill  Rosuvastatin 10 which is intermediate and very safe medication was sent to your pharmacy    Because you are at moderate risk I asked that you have a stress test, regular stress test on the treadmill, and also a 48 hour monitor  We will call you with results  Please obtain a home blood pressure cuff from [facility] readings are normal    You have mild sleep apnea, any weight loss will help this  I do not believe it is causing her hypertension

## 2019-11-07 NOTE — TELEPHONE ENCOUNTER
Pauline saw Dr Sally James in office today and he requested an old holter and stress test report  Not sure of the year, but it was done thru Dr Emma Segovia office  I called & requested that it be faxed or added to the chart

## 2019-11-07 NOTE — PROGRESS NOTES
Cardiology Consultation     Lyndel Osgood  [id number]  1955  HEART & VASCULAR [facility] [address]    This pleasant 65-year-old woman who works for [employer] in [facility] [employer] [facility] [employer], is seen with an ongoing problem, at times highly symptomatic, persistent PVCs  She has had extensive evaluation by another cardiologist in town but unfortunate was records are not yet scanned in  Her ventricular ectopy may be complex, per my scanning the data it is incomplete  She has not had lightheadedness syncope or presyncope  Her blood pressure is probably under good control although she does not do home monitoring period she does avoid caffeine and over-the-counter preparations  Currently she is taking metoprolol 50 and is adding an extra half so that all be 20 5 a m  50 p m  [place] She complains of metoprolol causing moderate fatigue and decided not to switch beta-blockers  She has at least a moderate coronary risk profile  Her son had an MI at age 39 and father  at 43 of myocardial infarction  There is a family history of sudden death  She quit smoking in about , she decided she wanted to live longer  She is not aware of any diabetic tendency and has been moderate the obese  She has a class 4 airway and has mild-to-moderate sleep apnea, was tested and has a good understanding of the disease  Her lipids are significantly abnormal despite taking pravastatin and today after long discussion she agreed to change therapy to intermediate dose, rosuvastatin 10 per day  She was warned about potential diabetes if the underlying tendency is there and also statin myalgia and was given strong reassurance that it is very important for her    She has moderate GERD which has been improving  Palpitations seem to be getting worse with age  Reassurance alone was not enough to make her deal with this  Unfortunately the episodes seem to persist     She had bilateral knee replacement with excellent results  She was asked to undertake a more rigorous exercise program with a goal of losing 5-10 pounds  She was assured that a regular stress test would be safe for her  Education wise she was asked to look up ASCVD risk score and also cardia smart total work to get a better handle on what she needs to do    1  Essential hypertension  rosuvastatin (CRESTOR) 10 MG tablet    Stress test only, exercise    CANCELED: POCT ECG   2  Palpitations  rosuvastatin (CRESTOR) 10 MG tablet    Holter monitor - 48 hour    CANCELED: POCT ECG   3  Dyslipidemia  rosuvastatin (CRESTOR) 10 MG tablet    Stress test only, exercise     Patient Active Problem List   Diagnosis    Primary osteoarthritis of left knee    Acute pain of left knee    Chronic GERD    Depression    Dyslipidemia    Hypertension    S/P total knee arthroplasty, right    Aftercare following surgery    Aftercare following right knee joint replacement surgery    Acute internal derangement of left knee    Complex tear of medial meniscus of left knee as current injury    Palpitations     Past Medical History:   Diagnosis Date    Anxiety     last assessed 8/24/15    Arthralgia of right knee     last assessed 5/27/14    Chronic interstitial cystitis     GERD (gastroesophageal reflux disease)     History of palpitations     Hypertension     Hypokalemia     last assessed 3/7/13    Irritable bowel syndrome     Osteoarthritis     last assessed 6/13/12    Osteopenia     last assessed 3/21/13    Postmenopausal atrophic vaginitis     last assessed 3/21/13    Stress incontinence     Uterine leiomyoma     resolved 1997     Social History     Socioeconomic History    Marital status:       Spouse name: Not on file    Number of children: Not on file    Years of education: Not on file    Highest education level: Not on file   Occupational History    Not on file   Social Needs    Financial resource strain: Not on file    Food insecurity:     Worry: Not on file     Inability: Not on file    Transportation needs:     Medical: Not on file     Non-medical: Not on file   Tobacco Use    Smoking status: Former Smoker     Packs/day: 0 50     Years: 20 00     Pack years: 10 00    Smokeless tobacco: Never Used    Tobacco comment: quit 13 years ago   / quit 3-4 yrs ago, smoked 20-25 yrs per allscripts 12    Substance and Sexual Activity    Alcohol use: Yes     Comment: rare per Allscri[ts    Drug use: No    Sexual activity: Not on file   Lifestyle    Physical activity:     Days per week: Not on file     Minutes per session: Not on file    Stress: Not on file   Relationships    Social connections:     Talks on phone: Not on file     Gets together: Not on file     Attends Muslim service: Not on file     Active member of club or organization: Not on file     Attends meetings of clubs or organizations: Not on file     Relationship status: Not on file    Intimate partner violence:     Fear of current or ex partner: Not on file     Emotionally abused: Not on file     Physically abused: Not on file     Forced sexual activity: Not on file   Other Topics Concern    Not on file   Social History Narrative    Daily caffeine consumption; 1 serving a day    Exercise habits      Family History   Problem Relation Age of Onset    Cancer Mother     Heart attack Father         MI    Heart disease Father     Heart attack Son 39        acute MI, NSTEMI    Lung cancer Other     Hypertension Family     Ulcers Family         peptic     Past Surgical History:   Procedure Laterality Date    BREAST SURGERY      puncture aspiration of cyst, left, resolved     CARDIAC CATHETERIZATION       SECTION  1977    daughter    COLONOSCOPY      complete, resolved     ESOPHAGOGASTRODUODENOSCOPY N/A 2016    Procedure: ESOPHAGOGASTRODUODENOSCOPY (EGD);   Surgeon: Valdemar Shen MD;  Location: [facility]; Service:   for Maico's esophagitis    KNEE ARTHROSCOPY  2011    SC TOTAL KNEE ARTHROPLASTY Right 2/28/2018    Procedure: ARTHROPLASTY KNEE TOTAL;  Surgeon: Ricky Orantes MD;  Location: [facility];  Service: Orthopedics  resolved 2010 per Allscripts    VAGINAL HYSTERECTOMY      fibroids, resolved 1997       Current Outpatient Medications:     losartan (COZAAR) 50 mg tablet, Take 1 tablet (50 mg total) by mouth every evening, Disp: 90 tablet, Rfl: 1    metoprolol succinate (TOPROL-XL) 50 mg 24 hr tablet, Take 1/2 tab in am and one tab in pm(total 75mg daily), Disp: 135 tablet, Rfl: 1    pantoprazole (PROTONIX) 40 mg tablet, Take 1 tablet (40 mg total) by mouth daily, Disp: 90 tablet, Rfl: 1    PARoxetine (PAXIL) 10 mg tablet, Take 1 tablet (10 mg total) by mouth 2 (two) times a day, Disp: 180 tablet, Rfl: 1    rosuvastatin (CRESTOR) 10 MG tablet, Take 1 tablet (10 mg total) by mouth daily, Disp: 90 tablet, Rfl: 3  Allergies   Allergen Reactions    Bactrim [Sulfamethoxazole-Trimethoprim] Hives, Itching and Rash    Sulfa Antibiotics Itching and Rash     Vitals:    11/07/19 1035   BP: 130/64   BP Location: Right arm   Cuff Size: Standard   Pulse: 56   Weight: 77 6 kg (171 lb)   Height: 4' 11 5" (1 511 m)       Labs:not applicable  Imaging: Xr Chest 2 Views    Result Date: 11/4/2019  Narrative: CHEST INDICATION:   Chest Pain  COMPARISON:  Chest radiograph from 2/12/2018 and 6/5/2011  EXAM PERFORMED/VIEWS:  XR CHEST PA & LATERAL  DUAL ENERGY SUBTRACTION TECHNIQUE FINDINGS: Cardiomediastinal silhouette appears unremarkable  The lungs are clear  No pneumothorax or pleural effusion  Osseous structures appear within normal limits for patient age  Impression: No acute cardiopulmonary disease   Workstation performed: [id number]       Review of Systems:  Review of Systems   Respiratory: Positive for choking and stridor (These 2 symptoms are listed because of her sleep apnea and occasional wakening  Her review of symptoms suggest that the sleep apnea is indeed mild to moderate  She never used to CPAP  )  Negative for cough, shortness of breath and wheezing  Cardiovascular: Positive for palpitations  Negative for chest pain and leg swelling  Neurological: Negative for dizziness, syncope, weakness and light-headedness  Physical Exam:  Physical Exam   Constitutional: She appears well-nourished  No distress  HENT:   Head:       Neck: Normal carotid pulses, no hepatojugular reflux and no JVD present  Carotid bruit is not present  No thyromegaly present  Cardiovascular: Regular rhythm and normal heart sounds  No extrasystoles (During prolonged auscultation and palpitation over pulses, radial which was quite compressible, I could not get any ectopy ) are present  Bradycardia present  Exam reveals no gallop  No murmur heard  Pulses:       Dorsalis pedis pulses are 0 on the right side, and 0 on the left side  Her perfusion seems to be good  With the constraints of this physical exam I just could not find good pulses  She gets shin splints when she is on the treadmill, I do not think this is vascular   Pulmonary/Chest: Breath sounds normal  No respiratory distress  Abdominal: Soft  Normal appearance and normal aorta  She exhibits no abdominal bruit  There is no tenderness  Musculoskeletal: She exhibits no edema  Discussion/Summary:  1   Ventricular ectopy which is probably come practice  Highly symptomatic  A 48 hour monitor was ordered, hopefully we can obtain a bad episode of her palpitations  I was surprised that she did not want to consider alternative therapy at this time, there is room to push up dose of beta-blockers, my own preference would be bisoprolol    2  Moderate to high risk coronary disease  She was asked to calculate her ASCVD risk score and cardiac Education emphasized    I believe that she will be able to do a regular stress test without problems    3  Sleep apnea which is probably mild, by history seems to be mild    4  Hypertension, probably well controlled    5  Hyperlipidemia, an adequate results with pravastatin     6   Incomplete database

## 2019-11-07 NOTE — ED PROVIDER NOTES
History  Chief Complaint   Patient presents with    Palpitations     palpitations all day     This is a 79-year-old female with a past medical history of anxiety who presents the emergency department this evening with palpitations  Patient states that she has been feeling for the last few weeks but they are getting more frequent especially today patient states she feels them every few seconds but denies any lightheadedness, dizziness, or loss of consciousness  Patient denies any chest pain or shortness of breath associated with the palpitations  Patient has a family history of multiple family members dying from heart attack in the 35s and 45s  Prior to Admission Medications   Prescriptions Last Dose Informant Patient Reported? Taking?    PARoxetine (PAXIL) 10 mg tablet 11/3/2019 at Unknown time  No Yes   Sig: Take 1 tablet (10 mg total) by mouth every evening   losartan (COZAAR) 50 mg tablet 11/3/2019 at Unknown time  No Yes   Sig: Take 1 tablet (50 mg total) by mouth every evening   metoprolol succinate (TOPROL-XL) 50 mg 24 hr tablet 11/3/2019 at Unknown time  No Yes   Sig: Take 1 tablet (50 mg total) by mouth every evening Pt hasnt taken it for awhile   pantoprazole (PROTONIX) 40 mg tablet 11/3/2019 at Unknown time  No Yes   Sig: Take 1 tablet (40 mg total) by mouth 2 (two) times a day   pravastatin (PRAVACHOL) 10 mg tablet 11/2/2019 at Unknown time  No Yes   Sig: Take 1 tablet (10 mg total) by mouth every evening      Facility-Administered Medications: None       Past Medical History:   Diagnosis Date    Anxiety     last assessed 8/24/15    Arthralgia of right knee     last assessed 5/27/14    Chronic interstitial cystitis     GERD (gastroesophageal reflux disease)     History of palpitations     Hypertension     Hypokalemia     last assessed 3/7/13    Irritable bowel syndrome     Osteoarthritis     last assessed 6/13/12    Osteopenia     last assessed 3/21/13    Postmenopausal atrophic vaginitis     last assessed 3/21/13    Stress incontinence     Uterine leiomyoma     resolved 1997       Past Surgical History:   Procedure Laterality Date    BREAST SURGERY      puncture aspiration of cyst, left, resolved 2002   [address]    daughter    COLONOSCOPY      complete, resolved 2005    ESOPHAGOGASTRODUODENOSCOPY N/A 8/11/2016    Procedure: ESOPHAGOGASTRODUODENOSCOPY (EGD); Surgeon: Radha Sow MD;  Location: [facility]; Service:   for Maico's esophagitis    KNEE ARTHROSCOPY  2011    [place] TOTAL KNEE ARTHROPLASTY Right 2/28/2018    Procedure: ARTHROPLASTY KNEE TOTAL;  Surgeon: Yue Perez MD;  Location:  MAIN OR;  Service: Orthopedics  resolved 2010 per Allscripts    VAGINAL HYSTERECTOMY      fibroids, resolved 1997       Family History   Problem Relation Age of Onset    Cancer Mother     Heart attack Father         MI    Heart disease Father     Heart attack Son 39        acute MI, NSTEMI    Lung cancer Other     Hypertension Family     Ulcers Family         peptic     I have reviewed and agree with the history as documented  Social History     Tobacco Use    Smoking status: Former Smoker     Packs/day: 0 50     Years: 20 00     Pack years: 10 00    Smokeless tobacco: Never Used    Tobacco comment: quit 13 years ago   / quit 3-4 yrs ago, smoked 20-25 yrs per allscripts 6/13/12    Substance Use Topics    Alcohol use: Yes     Comment: rare per Allscri[ts    Drug use: No        Review of Systems   Constitutional: Negative for chills, fatigue and fever  HENT: Negative for congestion, rhinorrhea, sinus pressure and sore throat  Eyes: Negative for visual disturbance  Respiratory: Negative for cough and shortness of breath  Cardiovascular: Positive for palpitations  Negative for chest pain  Gastrointestinal: Negative for abdominal pain, constipation, diarrhea, nausea and vomiting     Genitourinary: Negative for dysuria, frequency, hematuria and urgency  Musculoskeletal: Negative for arthralgias and myalgias  Skin: Negative for color change and rash  Neurological: Negative for dizziness, light-headedness and numbness  Physical Exam  ED Triage Vitals [11/03/19 1814]   Temperature Pulse Respirations Blood Pressure SpO2   97 9 °F (36 6 °C) 88 18 (!) 184/84 99 %      Temp Source Heart Rate Source Patient Position - Orthostatic VS BP Location FiO2 (%)   Oral Monitor Sitting Right arm --      Pain Score       No Pain             Orthostatic Vital Signs  Vitals:    11/03/19 1814 11/03/19 1900 11/03/19 2030   BP: (!) 184/84 161/76 151/70   Pulse: 88 69 60   Patient Position - Orthostatic VS: Sitting Lying Lying       Physical Exam   Constitutional: She is oriented to person, place, and time  She appears well-developed and well-nourished  No distress  HENT:   Head: Normocephalic and atraumatic  Eyes: Pupils are equal, round, and reactive to light  Conjunctivae and EOM are normal  Right eye exhibits no discharge  Left eye exhibits no discharge  No scleral icterus  Neck: Normal range of motion  Neck supple  No JVD present  Cardiovascular: Normal rate, regular rhythm and normal heart sounds  Exam reveals no gallop and no friction rub  No murmur heard  Multiple PACs seen on the cardiac monitor occurring approximately once every 10 seconds  Pulmonary/Chest: Effort normal and breath sounds normal  No stridor  No respiratory distress  She has no wheezes  She has no rales  Abdominal: Soft  Bowel sounds are normal  She exhibits no distension  There is no tenderness  There is no guarding  Musculoskeletal: Normal range of motion  She exhibits no edema, tenderness or deformity  Neurological: She is alert and oriented to person, place, and time  No cranial nerve deficit or sensory deficit  She exhibits normal muscle tone  Skin: Skin is warm and dry  No rash noted  She is not diaphoretic  No erythema  No pallor  Psychiatric: She has a normal mood and affect  Her behavior is normal    Nursing note and vitals reviewed  ED Medications  Medications - No data to display    Diagnostic Studies  Results Reviewed     Procedure Component Value Units Date/Time    TSH, 3rd generation with Free T4 reflex [[id number]]  (Normal) Collected:  11/03/19 1809    Lab Status:  Final result Specimen:  Blood from Arm, Left Updated:  11/03/19 1959     TSH 3RD GENERATON 2 050 uIU/mL     Narrative:       Patients undergoing fluorescein dye angiography may retain small amounts of fluorescein in the body for 48-72 hours post procedure  Samples containing fluorescein can produce falsely depressed TSH values  If the patient had this procedure,a specimen should be resubmitted post fluorescein clearance        Troponin I [[id number]]  (Normal) Collected:  11/03/19 1809    Lab Status:  Final result Specimen:  Blood from Arm, Left Updated:  11/03/19 1839     Troponin I <0 02 ng/mL     Comprehensive metabolic panel [[id number]] Collected:  11/03/19 1809    Lab Status:  Final result Specimen:  Blood from Arm, Left Updated:  11/03/19 1837     Sodium 141 mmol/L      Potassium 3 7 mmol/L      Chloride 105 mmol/L      CO2 30 mmol/L      ANION GAP 6 mmol/L      BUN 18 mg/dL      Creatinine 0 81 mg/dL      Glucose 96 mg/dL      Calcium 9 5 mg/dL      AST 13 U/L      ALT 19 U/L      Alkaline Phosphatase 88 U/L      Total Protein 8 0 g/dL      Albumin 3 8 g/dL      Total Bilirubin 0 47 mg/dL      eGFR 76 ml/min/1 73sq m     Narrative:       [facility] guidelines for Chronic Kidney Disease (CKD):     Stage 1 with normal or high GFR (GFR > 90 mL/min/1 73 square meters)    Stage 2 Mild CKD (GFR = 60-89 mL/min/1 73 square meters)    Stage 3A Moderate CKD (GFR = 45-59 mL/min/1 73 square meters)    Stage 3B Moderate CKD (GFR = 30-44 mL/min/1 73 square meters)    Stage 4 Severe CKD (GFR = 15-29 mL/min/1 73 square meters)    Stage 5 End Stage CKD (GFR <15 mL/min/1 73 square meters)  Note: GFR calculation is accurate only with a steady state creatinine    CBC and differential [[id number]]  (Abnormal) Collected:  11/03/19 1809    Lab Status:  Final result Specimen:  Blood from Arm, Left Updated:  11/03/19 1820     WBC 13 40 Thousand/uL      RBC 5 11 Million/uL      Hemoglobin 14 2 g/dL      Hematocrit 45 9 %      MCV 90 fL      MCH 27 8 pg      MCHC 30 9 g/dL      RDW 13 4 %      MPV 10 4 fL      Platelets 885 Thousands/uL      nRBC 0 /100 WBCs      Neutrophils Relative 67 %      Immat GRANS % 1 %      Lymphocytes Relative 25 %      Monocytes Relative 5 %      Eosinophils Relative 1 %      Basophils Relative 1 %      Neutrophils Absolute 9 01 Thousands/µL      Immature Grans Absolute 0 07 Thousand/uL      Lymphocytes Absolute 3 34 Thousands/µL      Monocytes Absolute 0 73 Thousand/µL      Eosinophils Absolute 0 18 Thousand/µL      Basophils Absolute 0 07 Thousands/µL                  XR chest 2 views   ED Interpretation by Karie Tong DO (11/03 1916)   No acute abnormalities  Final Result by Valerio Hanson MD (11/04 3709)      No acute cardiopulmonary disease              Workstation performed: [id number]               Procedures  ECG 12 Lead Documentation Only  Date/Time: 11/7/2019 1:41 AM  Performed by: Jr Barker MD  Authorized by: Jr Barker MD     Indications / Diagnosis:  Palpitations  ECG reviewed by me, the ED Provider: yes    Patient location:  ED  Interpretation:     Interpretation: abnormal    Rate:     ECG rate assessment: normal    Rhythm:     Rhythm: sinus rhythm    Ectopy:     Ectopy: none    QRS:     QRS axis:  Normal    QRS intervals:  Normal  Conduction:     Conduction: normal    ST segments:     ST segments:  Normal  T waves:     T waves: inverted      Inverted:  III and V1  Comments:      QTC is 407 milliseconds            ED Course           Identification of Seniors at Risk      Most Recent Value   (ISAR) Identification of Seniors at Risk   Before the illness or injury that brought you to the Emergency, did you need someone to help you on a regular basis? 0 Filed at: 11/03/2019 1807   In the last 24 hours, have you needed more help than usual?  0 Filed at: 11/03/2019 1807   Have you been hospitalized for one or more nights during the past 6 months? 0 Filed at: 11/03/2019 1807   In general, do you see well?  0 Filed at: 11/03/2019 1807   In general, do you have serious problems with your memory? 0 Filed at: 11/03/2019 1807   Do you take more than three different medications every day?  0 Filed at: 11/03/2019 1807   ISAR Score  0 Filed at: 11/03/2019 1807                          MDM  Number of Diagnoses or Management Options  Palpitations:   Diagnosis management comments: Patient appears to be having PACs on the monitor here in the emergency department  She feels a fluttering in her chest every time that they appear on the monitor but is otherwise asymptomatic  Will discharge the patient home with instructions to follow up with Cardiology for possible Holter monitor placement or return to the emergency department sooner if she develops any new or otherwise concerning symptoms  Disposition  Final diagnoses:   Palpitations     Time reflects when diagnosis was documented in both MDM as applicable and the Disposition within this note     Time User Action Codes Description Comment    11/3/2019  8:43 PM Geri Causey Add [R00 2] Palpitations       ED Disposition     ED Disposition Condition Date/Time Comment    Discharge Stable Sun Nov 3, 2019  8:43 PM Wilman Scott discharge to home/self care  Follow-up Information     Follow up With Specialties Details Why Contact Info Additional Information    Israel Cardona, DO Internal Medicine, Family Medicine  As needed [address]  [address] [facility] [address] Kim Blanc, DO Internal Medicine, Family Medicine   [address] [facility] [address] [place] [address] Emergency Department Emergency Medicine  If symptoms worsen [address]  [phone]  ED, [address] , [place], [place], [address]    [address] [facility] Cardiology   [address] [facility] [place] [address]  Cardiology [place], [address], [place], [place], [place] [address]          Discharge Medication List as of 11/3/2019  8:48 PM      CONTINUE these medications which have NOT CHANGED    Details   losartan (COZAAR) 50 mg tablet Take 1 tablet (50 mg total) by mouth every evening, Starting Tue 12/11/2018, Normal      metoprolol succinate (TOPROL-XL) 50 mg 24 hr tablet Take 1 tablet (50 mg total) by mouth every evening Pt hasnt taken it for awhile, Starting Tue 12/11/2018, Normal      pantoprazole (PROTONIX) 40 mg tablet Take 1 tablet (40 mg total) by mouth 2 (two) times a day, Starting Tue 12/11/2018, Normal      PARoxetine (PAXIL) 10 mg tablet Take 1 tablet (10 mg total) by mouth every evening, Starting Tue 12/11/2018, Normal      pravastatin (PRAVACHOL) 10 mg tablet Take 1 tablet (10 mg total) by mouth every evening, Starting Tue 12/11/2018, Normal           No discharge procedures on file  ED Provider  Attending physically available and evaluated Shar Marinelli I managed the patient along with the ED Attending      Electronically Signed by         Miguelito Blanc MD  11/07/19 6069

## 2019-11-25 ENCOUNTER — HOSPITAL ENCOUNTER (OUTPATIENT)
Dept: NON INVASIVE DIAGNOSTICS | Facility: CLINIC | Age: 64
Discharge: HOME | End: 2019-11-25
Payer: COMMERCIAL

## 2019-11-25 DIAGNOSIS — E78.5 DYSLIPIDEMIA: ICD-10-CM

## 2019-11-25 DIAGNOSIS — R00.2 PALPITATIONS: ICD-10-CM

## 2019-11-25 DIAGNOSIS — I10 ESSENTIAL HYPERTENSION: ICD-10-CM

## 2019-11-25 LAB
CHEST PAIN STATEMENT: NORMAL
MAX DIASTOLIC BP: 74 MMHG
MAX HEART RATE: 120 BPM
MAX PREDICTED HEART RATE: 155 BPM
MAX. SYSTOLIC BP: 158 MMHG
PROTOCOL NAME: NORMAL
TARGET HR FORMULA: NORMAL
TEST INDICATION: NORMAL
TIME IN EXERCISE PHASE: NORMAL

## 2019-11-25 PROCEDURE — 93225 XTRNL ECG REC<48 HRS REC: CPT

## 2019-11-25 PROCEDURE — 93226 XTRNL ECG REC<48 HR SCAN A/R: CPT

## 2019-11-25 PROCEDURE — 93018 CV STRESS TEST I&R ONLY: CPT | Performed by: INTERNAL MEDICINE

## 2019-11-25 PROCEDURE — 93016 CV STRESS TEST SUPVJ ONLY: CPT | Performed by: INTERNAL MEDICINE

## 2019-11-25 PROCEDURE — 93017 CV STRESS TEST TRACING ONLY: CPT

## 2019-11-27 PROCEDURE — 93227 XTRNL ECG REC<48 HR R&I: CPT | Performed by: INTERNAL MEDICINE

## 2019-11-29 ENCOUNTER — TELEPHONE (OUTPATIENT)
Dept: CARDIOLOGY CLINIC | Facility: CLINIC | Age: 64
End: 2019-11-29

## 2019-11-29 NOTE — TELEPHONE ENCOUNTER
MD Karolyn Cruz             Please tell her that both the holter and stress test were fin   No arrhythmia   Has medication been working better?       I called & left a vmm with results

## 2020-08-29 ENCOUNTER — APPOINTMENT (OUTPATIENT)
Dept: LAB | Age: 65
End: 2020-08-29
Payer: COMMERCIAL

## 2020-08-29 DIAGNOSIS — E78.5 DYSLIPIDEMIA: ICD-10-CM

## 2020-08-29 LAB
ALBUMIN SERPL BCP-MCNC: 3.5 G/DL (ref 3.5–5)
ALP SERPL-CCNC: 78 U/L (ref 46–116)
ALT SERPL W P-5'-P-CCNC: 17 U/L (ref 12–78)
ANION GAP SERPL CALCULATED.3IONS-SCNC: 4 MMOL/L (ref 4–13)
AST SERPL W P-5'-P-CCNC: 14 U/L (ref 5–45)
BILIRUB SERPL-MCNC: 0.64 MG/DL (ref 0.2–1)
BUN SERPL-MCNC: 15 MG/DL (ref 5–25)
CALCIUM SERPL-MCNC: 9.1 MG/DL (ref 8.3–10.1)
CHLORIDE SERPL-SCNC: 108 MMOL/L (ref 100–108)
CHOLEST SERPL-MCNC: 181 MG/DL (ref 50–200)
CO2 SERPL-SCNC: 29 MMOL/L (ref 21–32)
CREAT SERPL-MCNC: 0.87 MG/DL (ref 0.6–1.3)
GFR SERPL CREATININE-BSD FRML MDRD: 70 ML/MIN/1.73SQ M
GLUCOSE P FAST SERPL-MCNC: 88 MG/DL (ref 65–99)
HDLC SERPL-MCNC: 50 MG/DL
LDLC SERPL CALC-MCNC: 112 MG/DL (ref 0–100)
NONHDLC SERPL-MCNC: 131 MG/DL
POTASSIUM SERPL-SCNC: 4.5 MMOL/L (ref 3.5–5.3)
PROT SERPL-MCNC: 7.4 G/DL (ref 6.4–8.2)
SODIUM SERPL-SCNC: 141 MMOL/L (ref 136–145)
TRIGL SERPL-MCNC: 96 MG/DL

## 2020-08-29 PROCEDURE — 80061 LIPID PANEL: CPT

## 2020-08-29 PROCEDURE — 36415 COLL VENOUS BLD VENIPUNCTURE: CPT

## 2020-08-29 PROCEDURE — 80053 COMPREHEN METABOLIC PANEL: CPT

## 2020-09-02 ENCOUNTER — OFFICE VISIT (OUTPATIENT)
Dept: FAMILY MEDICINE CLINIC | Facility: CLINIC | Age: 65
End: 2020-09-02
Payer: COMMERCIAL

## 2020-09-02 VITALS
DIASTOLIC BLOOD PRESSURE: 68 MMHG | TEMPERATURE: 98.2 F | SYSTOLIC BLOOD PRESSURE: 104 MMHG | OXYGEN SATURATION: 98 % | HEART RATE: 53 BPM | HEIGHT: 60 IN | RESPIRATION RATE: 16 BRPM | WEIGHT: 168 LBS | BODY MASS INDEX: 32.98 KG/M2

## 2020-09-02 DIAGNOSIS — F32.A DEPRESSION, UNSPECIFIED DEPRESSION TYPE: ICD-10-CM

## 2020-09-02 DIAGNOSIS — E78.5 DYSLIPIDEMIA: ICD-10-CM

## 2020-09-02 DIAGNOSIS — Z12.9 SCREENING FOR CANCER: ICD-10-CM

## 2020-09-02 DIAGNOSIS — I10 ESSENTIAL HYPERTENSION: Primary | ICD-10-CM

## 2020-09-02 DIAGNOSIS — R00.2 PALPITATIONS: ICD-10-CM

## 2020-09-02 DIAGNOSIS — I10 HYPERTENSION, UNSPECIFIED TYPE: ICD-10-CM

## 2020-09-02 DIAGNOSIS — K21.9 CHRONIC GERD: ICD-10-CM

## 2020-09-02 PROCEDURE — 99214 OFFICE O/P EST MOD 30 MIN: CPT | Performed by: INTERNAL MEDICINE

## 2020-09-02 RX ORDER — METOPROLOL SUCCINATE 50 MG/1
TABLET, EXTENDED RELEASE ORAL
Qty: 135 TABLET | Refills: 1 | Status: SHIPPED | OUTPATIENT
Start: 2020-09-02 | End: 2022-05-02 | Stop reason: SDUPTHER

## 2020-09-02 RX ORDER — ROSUVASTATIN CALCIUM 10 MG/1
10 TABLET, COATED ORAL DAILY
Qty: 90 TABLET | Refills: 3 | Status: SHIPPED | OUTPATIENT
Start: 2020-09-02 | End: 2022-05-02 | Stop reason: SDUPTHER

## 2020-09-02 RX ORDER — PANTOPRAZOLE SODIUM 40 MG/1
40 TABLET, DELAYED RELEASE ORAL DAILY
Qty: 90 TABLET | Refills: 1 | Status: SHIPPED | OUTPATIENT
Start: 2020-09-02 | End: 2022-05-02 | Stop reason: SDUPTHER

## 2020-09-02 RX ORDER — PAROXETINE 10 MG/1
10 TABLET, FILM COATED ORAL 2 TIMES DAILY
Qty: 180 TABLET | Refills: 1 | Status: SHIPPED | OUTPATIENT
Start: 2020-09-02 | End: 2022-05-02 | Stop reason: SDUPTHER

## 2020-09-02 RX ORDER — LOSARTAN POTASSIUM 50 MG/1
50 TABLET ORAL EVERY EVENING
Qty: 90 TABLET | Refills: 1 | Status: SHIPPED | OUTPATIENT
Start: 2020-09-02 | End: 2022-05-02 | Stop reason: SDUPTHER

## 2020-09-02 NOTE — PROGRESS NOTES
BMI Counseling: Body mass index is 33 36 kg/m²  The BMI is above normal  Nutrition recommendations include decreasing portion sizes and limiting drinks that contain sugar  Exercise recommendations include exercising 3-5 times per week  No pharmacotherapy was ordered  Patient referred to PCP due to patient being overweight  Assessment/Plan:         Diagnoses and all orders for this visit:    Screening for cancer  -     Ambulatory referral to Gynecology; Future    Essential hypertension  -     losartan (COZAAR) 50 mg tablet; Take 1 tablet (50 mg total) by mouth every evening  -     rosuvastatin (CRESTOR) 10 MG tablet; Take 1 tablet (10 mg total) by mouth daily    Hypertension, unspecified type  -     metoprolol succinate (TOPROL-XL) 50 mg 24 hr tablet; Take 1/2 tab in am and one tab in pm(total 75mg daily)    Chronic GERD  -     pantoprazole (PROTONIX) 40 mg tablet; Take 1 tablet (40 mg total) by mouth daily    Depression, unspecified depression type  -     PARoxetine (PAXIL) 10 mg tablet; Take 1 tablet (10 mg total) by mouth 2 (two) times a day    Palpitations  Comments:  will try prn 1/2 tab metoprolol for palp since her pulse is low  Orders:  -     rosuvastatin (CRESTOR) 10 MG tablet; Take 1 tablet (10 mg total) by mouth daily    Dyslipidemia  Comments:  on statin  Orders:  -     rosuvastatin (CRESTOR) 10 MG tablet; Take 1 tablet (10 mg total) by mouth daily          Subjective:      Patient ID: Bridget Pantoja is a 76 y o  female  Pt denies new problems  Needs rx  Discussed pulse is 53  Will hold 1/2 tab  Use 1/2 tab prn if gets her extra beats back          The following portions of the patient's history were reviewed and updated as appropriate: She  has a past medical history of Anxiety, Arthralgia of right knee, Chronic interstitial cystitis, GERD (gastroesophageal reflux disease), High cholesterol, History of palpitations, Hypertension, Hypokalemia, Irritable bowel syndrome, Osteoarthritis, Osteopenia, Postmenopausal atrophic vaginitis, Stress incontinence, and Uterine leiomyoma  She   Patient Active Problem List    Diagnosis Date Noted    Palpitations 2019    Complex tear of medial meniscus of left knee as current injury 2019    Acute internal derangement of left knee 2019    Aftercare following right knee joint replacement surgery 2018    Aftercare following surgery 2018    S/P total knee arthroplasty, right 2018    Chronic GERD 2018    Primary osteoarthritis of left knee 2017    Acute pain of left knee 2017    Depression 2013    Dyslipidemia 2012    Hypertension 2012     She  has a past surgical history that includes  section (); Esophagogastroduodenoscopy (N/A, 2016); Knee arthroscopy (); Cardiac catheterization; pr total knee arthroplasty (Right, 2018); Breast surgery; Colonoscopy; and Vaginal hysterectomy  Her family history includes Cancer in her mother; Heart attack in her father; Heart attack (age of onset: 39) in her son; Heart disease in her father; Hypertension in her family, father, and mother; Lung cancer in her other; Ulcers in her family  She  reports that she has quit smoking  She has a 10 00 pack-year smoking history  She has never used smokeless tobacco  She reports current alcohol use  She reports that she does not use drugs    Current Outpatient Medications   Medication Sig Dispense Refill    losartan (COZAAR) 50 mg tablet Take 1 tablet (50 mg total) by mouth every evening 90 tablet 1    metoprolol succinate (TOPROL-XL) 50 mg 24 hr tablet Take 1/2 tab in am and one tab in pm(total 75mg daily) 135 tablet 1    pantoprazole (PROTONIX) 40 mg tablet Take 1 tablet (40 mg total) by mouth daily 90 tablet 1    PARoxetine (PAXIL) 10 mg tablet Take 1 tablet (10 mg total) by mouth 2 (two) times a day 180 tablet 1    rosuvastatin (CRESTOR) 10 MG tablet Take 1 tablet (10 mg total) by mouth daily 90 tablet 3     No current facility-administered medications for this visit  No current outpatient medications on file prior to visit  No current facility-administered medications on file prior to visit  She is allergic to bactrim [sulfamethoxazole-trimethoprim] and sulfa antibiotics       Review of Systems   Constitutional: Negative  HENT: Negative  Respiratory: Negative  Cardiovascular: Negative  Negative for palpitations  Objective:      /68 (BP Location: Right arm, Patient Position: Sitting, Cuff Size: Standard)   Pulse (!) 53   Temp 98 2 °F (36 8 °C) (Temporal)   Resp 16   Ht 4' 11 5" (1 511 m)   Wt 76 2 kg (168 lb)   SpO2 98%   BMI 33 36 kg/m²          Physical Exam  Constitutional:       Appearance: Normal appearance  She is obese  HENT:      Head: Normocephalic and atraumatic  Right Ear: Tympanic membrane and ear canal normal       Left Ear: Tympanic membrane and ear canal normal       Nose: Nose normal    Neck:      Musculoskeletal: Normal range of motion and neck supple  Cardiovascular:      Rate and Rhythm: Normal rate and regular rhythm  Pulmonary:      Effort: Pulmonary effort is normal       Breath sounds: Normal breath sounds  Skin:     General: Skin is warm and dry  Neurological:      Mental Status: She is alert

## 2020-09-08 ENCOUNTER — HOSPITAL ENCOUNTER (OUTPATIENT)
Dept: RADIOLOGY | Age: 65
Discharge: HOME | End: 2020-09-08
Payer: COMMERCIAL

## 2020-09-08 VITALS — WEIGHT: 168 LBS | BODY MASS INDEX: 32.98 KG/M2 | HEIGHT: 60 IN

## 2020-09-08 DIAGNOSIS — Z12.39 SCREENING FOR BREAST CANCER: ICD-10-CM

## 2020-09-08 DIAGNOSIS — Z12.31 ENCOUNTER FOR SCREENING MAMMOGRAM FOR MALIGNANT NEOPLASM OF BREAST: ICD-10-CM

## 2020-09-08 PROCEDURE — 77067 SCR MAMMO BI INCL CAD: CPT

## 2020-09-08 PROCEDURE — 77063 BREAST TOMOSYNTHESIS BI: CPT

## 2020-09-21 ENCOUNTER — TELEPHONE (OUTPATIENT)
Dept: OBGYN CLINIC | Facility: HOSPITAL | Age: 65
End: 2020-09-21

## 2020-10-02 ENCOUNTER — APPOINTMENT (OUTPATIENT)
Dept: RADIOLOGY | Facility: MEDICAL CENTER | Age: 65
End: 2020-10-02
Payer: COMMERCIAL

## 2020-10-02 ENCOUNTER — OFFICE VISIT (OUTPATIENT)
Dept: OBGYN CLINIC | Facility: MEDICAL CENTER | Age: 65
End: 2020-10-02
Payer: COMMERCIAL

## 2020-10-02 VITALS
SYSTOLIC BLOOD PRESSURE: 121 MMHG | WEIGHT: 167 LBS | HEIGHT: 60 IN | BODY MASS INDEX: 32.79 KG/M2 | DIASTOLIC BLOOD PRESSURE: 89 MMHG | HEART RATE: 96 BPM

## 2020-10-02 DIAGNOSIS — S83.242D OTHER TEAR OF MEDIAL MENISCUS OF LEFT KNEE AS CURRENT INJURY, SUBSEQUENT ENCOUNTER: ICD-10-CM

## 2020-10-02 DIAGNOSIS — M17.12 PRIMARY OSTEOARTHRITIS OF LEFT KNEE: ICD-10-CM

## 2020-10-02 DIAGNOSIS — M17.12 PRIMARY OSTEOARTHRITIS OF LEFT KNEE: Primary | ICD-10-CM

## 2020-10-02 PROBLEM — S83.242A TEAR OF MEDIAL MENISCUS OF LEFT KNEE, CURRENT: Status: ACTIVE | Noted: 2020-10-02

## 2020-10-02 PROCEDURE — 99213 OFFICE O/P EST LOW 20 MIN: CPT | Performed by: ORTHOPAEDIC SURGERY

## 2020-10-02 PROCEDURE — 73560 X-RAY EXAM OF KNEE 1 OR 2: CPT

## 2020-10-02 RX ORDER — CEFAZOLIN SODIUM 2 G/50ML
2000 SOLUTION INTRAVENOUS ONCE
Status: CANCELLED | OUTPATIENT
Start: 2020-10-02 | End: 2020-10-02

## 2020-10-02 RX ORDER — CHLORHEXIDINE GLUCONATE 4 G/100ML
SOLUTION TOPICAL DAILY PRN
Status: CANCELLED | OUTPATIENT
Start: 2020-10-02

## 2020-10-09 ENCOUNTER — PREP FOR PROCEDURE (OUTPATIENT)
Dept: OBGYN CLINIC | Facility: MEDICAL CENTER | Age: 65
End: 2020-10-09

## 2020-10-09 DIAGNOSIS — Z79.01 INADEQUATE ANTICOAGULATION: ICD-10-CM

## 2020-10-09 DIAGNOSIS — Z51.81 INADEQUATE ANTICOAGULATION: ICD-10-CM

## 2020-10-09 DIAGNOSIS — Z01.812 PRE-OPERATIVE LABORATORY EXAMINATION: Primary | ICD-10-CM

## 2020-11-18 ENCOUNTER — TELEPHONE (OUTPATIENT)
Dept: OBGYN CLINIC | Facility: HOSPITAL | Age: 65
End: 2020-11-18

## 2020-12-01 ENCOUNTER — TELEMEDICINE (OUTPATIENT)
Dept: FAMILY MEDICINE CLINIC | Facility: CLINIC | Age: 65
End: 2020-12-01
Payer: COMMERCIAL

## 2020-12-01 DIAGNOSIS — Z03.818 ENCOUNTER FOR OBSERVATION FOR SUSPECTED EXPOSURE TO OTHER BIOLOGICAL AGENTS RULED OUT: ICD-10-CM

## 2020-12-01 DIAGNOSIS — B34.9 VIRAL INFECTION, UNSPECIFIED: ICD-10-CM

## 2020-12-01 DIAGNOSIS — J01.90 ACUTE SINUSITIS, RECURRENCE NOT SPECIFIED, UNSPECIFIED LOCATION: Primary | ICD-10-CM

## 2020-12-01 PROCEDURE — U0003 INFECTIOUS AGENT DETECTION BY NUCLEIC ACID (DNA OR RNA); SEVERE ACUTE RESPIRATORY SYNDROME CORONAVIRUS 2 (SARS-COV-2) (CORONAVIRUS DISEASE [COVID-19]), AMPLIFIED PROBE TECHNIQUE, MAKING USE OF HIGH THROUGHPUT TECHNOLOGIES AS DESCRIBED BY CMS-2020-01-R: HCPCS | Performed by: PHYSICIAN ASSISTANT

## 2020-12-01 PROCEDURE — 99213 OFFICE O/P EST LOW 20 MIN: CPT | Performed by: PHYSICIAN ASSISTANT

## 2020-12-01 RX ORDER — AMOXICILLIN 500 MG/1
500 CAPSULE ORAL EVERY 8 HOURS SCHEDULED
Qty: 30 CAPSULE | Refills: 0 | Status: SHIPPED | OUTPATIENT
Start: 2020-12-01 | End: 2020-12-11

## 2020-12-02 LAB — SARS-COV-2 RNA SPEC QL NAA+PROBE: NOT DETECTED

## 2021-02-10 ENCOUNTER — IMMUNIZATIONS (OUTPATIENT)
Dept: FAMILY MEDICINE CLINIC | Facility: HOSPITAL | Age: 66
End: 2021-02-10

## 2021-02-10 DIAGNOSIS — Z23 ENCOUNTER FOR IMMUNIZATION: Primary | ICD-10-CM

## 2021-02-10 PROCEDURE — 0001A SARS-COV-2 / COVID-19 MRNA VACCINE (PFIZER-BIONTECH) 30 MCG: CPT

## 2021-02-10 PROCEDURE — 91300 SARS-COV-2 / COVID-19 MRNA VACCINE (PFIZER-BIONTECH) 30 MCG: CPT

## 2021-03-01 ENCOUNTER — TELEMEDICINE (OUTPATIENT)
Dept: FAMILY MEDICINE CLINIC | Facility: CLINIC | Age: 66
End: 2021-03-01
Payer: COMMERCIAL

## 2021-03-01 DIAGNOSIS — J32.9 SINUSITIS, UNSPECIFIED CHRONICITY, UNSPECIFIED LOCATION: Primary | ICD-10-CM

## 2021-03-01 DIAGNOSIS — Z20.822 CLOSE EXPOSURE TO COVID-19 VIRUS: ICD-10-CM

## 2021-03-01 PROCEDURE — U0003 INFECTIOUS AGENT DETECTION BY NUCLEIC ACID (DNA OR RNA); SEVERE ACUTE RESPIRATORY SYNDROME CORONAVIRUS 2 (SARS-COV-2) (CORONAVIRUS DISEASE [COVID-19]), AMPLIFIED PROBE TECHNIQUE, MAKING USE OF HIGH THROUGHPUT TECHNOLOGIES AS DESCRIBED BY CMS-2020-01-R: HCPCS | Performed by: INTERNAL MEDICINE

## 2021-03-01 PROCEDURE — 99213 OFFICE O/P EST LOW 20 MIN: CPT | Performed by: INTERNAL MEDICINE

## 2021-03-01 PROCEDURE — U0005 INFEC AGEN DETEC AMPLI PROBE: HCPCS | Performed by: INTERNAL MEDICINE

## 2021-03-01 RX ORDER — AMOXICILLIN 500 MG/1
500 CAPSULE ORAL EVERY 8 HOURS SCHEDULED
Qty: 30 CAPSULE | Refills: 0 | Status: SHIPPED | OUTPATIENT
Start: 2021-03-01 | End: 2021-03-11

## 2021-03-01 NOTE — PROGRESS NOTES
Virtual Regular Visit      Assessment/Plan:    Problem List Items Addressed This Visit     None      Visit Diagnoses     Sinusitis, unspecified chronicity, unspecified location    -  Primary    Relevant Medications    amoxicillin (AMOXIL) 500 mg capsule    Other Relevant Orders    Novel Coronavirus (Covid-19),PCR [facility] - Collected in Office (Completed)    Close exposure to COVID-19 virus              BMI Counseling: There is no height or weight on file to calculate BMI  The BMI is above normal  Nutrition recommendations include decreasing portion sizes and limiting drinks that contain sugar  Exercise recommendations include exercising 3-5 times per week  Patient referred to PCP due to patient being overweight  Reason for visit is see hpi     Encounter provider Jenney Kehr, DO    Provider located at [address] [place] [id number] [phone]      Recent Visits  Date Type Provider Dept   03/01/21 Telemedicine Jenney Kehr, DO [facility]   Showing recent visits within past 7 days and meeting all other requirements     Future Appointments  No visits were found meeting these conditions  Showing future appointments within next 150 days and meeting all other requirements        The patient was identified by name and date of birth  Leslie Mason was informed that this is a telemedicine visit and that the visit is being conducted through [address] [place] [address] and patient was informed that this is not a secure, HIPAA-compliant platform  She agrees to proceed     My office door was closed  No one else was in the room  She acknowledged consent and understanding of privacy and security of the video platform  The patient has agreed to participate and understands they can discontinue the visit at any time  Patient is aware this is a billable service  Subjective  Leslie Mason is a 76 y o  female see hpi         Pt states her granddaughter and her  are living with her until they get their home  Her grandson-in-law tested + for covid  Pt has had sinus h/a x 3 days  Denies f/s/c,  Denies cough or sob or diarrhea or loss of taste or smell  Discussed her grandson in law is isolating and told her to Mert Hernández  Past Medical History:   Diagnosis Date    Anxiety     last assessed 8/24/15    Arthralgia of right knee     last assessed 5/27/14    Chronic interstitial cystitis     GERD (gastroesophageal reflux disease)     High cholesterol     History of palpitations     Hypertension     Hypokalemia     last assessed 3/7/13    Irritable bowel syndrome     Osteoarthritis     last assessed 6/13/12    Osteopenia     last assessed 3/21/13    Postmenopausal atrophic vaginitis     last assessed 3/21/13    Stress incontinence     Uterine leiomyoma     resolved 1997       Past Surgical History:   Procedure Laterality Date    BREAST CYST ASPIRATION Left 2002    benign    BREAST SURGERY      puncture aspiration of cyst, left, resolved 2002   [address]    daughter    COLONOSCOPY      complete, resolved 2005    ESOPHAGOGASTRODUODENOSCOPY N/A 8/11/2016    Procedure: ESOPHAGOGASTRODUODENOSCOPY (EGD); Surgeon: Efrain Gallo MD;  Location: [facility];   Service:   for Maico's esophagitis    KNEE ARTHROSCOPY  2011    [place] TOTAL KNEE ARTHROPLASTY Right 2/28/2018    Procedure: ARTHROPLASTY KNEE TOTAL;  Surgeon: Sanjiv Norton MD;  [facility];  Service: Orthopedics  resolved 2010 per Allscripts    VAGINAL HYSTERECTOMY      fibroids, resolved 1997       Current Outpatient Medications   Medication Sig Dispense Refill    amoxicillin (AMOXIL) 500 mg capsule Take 1 capsule (500 mg total) by mouth every 8 (eight) hours for 10 days 30 capsule 0    losartan (COZAAR) 50 mg tablet Take 1 tablet (50 mg total) by mouth every evening 90 tablet 1    metoprolol succinate (TOPROL-XL) 50 mg 24 hr tablet Take 1/2 tab in am and one tab in pm(total 75mg daily) 135 tablet 1    pantoprazole (PROTONIX) 40 mg tablet Take 1 tablet (40 mg total) by mouth daily 90 tablet 1    PARoxetine (PAXIL) 10 mg tablet Take 1 tablet (10 mg total) by mouth 2 (two) times a day 180 tablet 1    rosuvastatin (CRESTOR) 10 MG tablet Take 1 tablet (10 mg total) by mouth daily 90 tablet 3     No current facility-administered medications for this visit  Allergies   Allergen Reactions    Bactrim [Sulfamethoxazole-Trimethoprim] Hives, Itching and Rash    Sulfa Antibiotics Itching and Rash       Review of Systems   Constitutional: Negative  Negative for fever  HENT: Positive for sinus pressure  Respiratory: Negative for cough and shortness of breath  Cardiovascular: Negative  Gastrointestinal: Negative for diarrhea  Neurological: Positive for headaches  Video Exam    There were no vitals filed for this visit  Physical Exam  Constitutional:       Appearance: She is obese  HENT:      Head: Normocephalic and atraumatic  Pulmonary:      Effort: No respiratory distress  Neurological:      Mental Status: She is alert  I spent 7 minutes directly with the patient during this visit      VIRTUAL VISIT C[address] 30 acknowledges that she has consented to an online visit or consultation  She understands that the online visit is based solely on information provided by her, and that, in the absence of a face-to-face physical evaluation by the physician, the diagnosis she receives is both limited and provisional in terms of accuracy and completeness  This is not intended to replace a full medical face-to-face evaluation by the physician  Valentin Anderson understands and accepts these terms

## 2021-03-02 LAB — SARS-COV-2 RNA RESP QL NAA+PROBE: NEGATIVE

## 2021-03-03 ENCOUNTER — IMMUNIZATIONS (OUTPATIENT)
Dept: FAMILY MEDICINE CLINIC | Facility: HOSPITAL | Age: 66
End: 2021-03-03

## 2021-03-03 DIAGNOSIS — Z23 ENCOUNTER FOR IMMUNIZATION: Primary | ICD-10-CM

## 2021-03-03 PROCEDURE — 91300 SARS-COV-2 / COVID-19 MRNA VACCINE (PFIZER-BIONTECH) 30 MCG: CPT

## 2021-03-03 PROCEDURE — 0002A SARS-COV-2 / COVID-19 MRNA VACCINE (PFIZER-BIONTECH) 30 MCG: CPT

## 2021-03-08 ENCOUNTER — TELEPHONE (OUTPATIENT)
Dept: FAMILY MEDICINE CLINIC | Facility: CLINIC | Age: 66
End: 2021-03-08

## 2021-03-08 DIAGNOSIS — R42 VERTIGO: Primary | ICD-10-CM

## 2021-03-08 RX ORDER — MECLIZINE HCL 12.5 MG/1
12.5 TABLET ORAL 3 TIMES DAILY PRN
Qty: 30 TABLET | Refills: 0 | Status: SHIPPED | OUTPATIENT
Start: 2021-03-08 | End: 2022-06-22

## 2021-03-08 NOTE — TELEPHONE ENCOUNTER
Patient was seen telemed 3/1 covid is negative, patient noticed yesterday morning she was dizziness, constant if she is turning her head or looking up, Ear pressure and facial pressure, NO fever cough or SOB,

## 2021-09-08 ENCOUNTER — APPOINTMENT (OUTPATIENT)
Dept: LAB | Age: 66
End: 2021-09-08

## 2021-09-08 DIAGNOSIS — Z00.8 HEALTH EXAMINATION IN POPULATION SURVEY: ICD-10-CM

## 2021-09-08 LAB
CHOLEST SERPL-MCNC: 142 MG/DL (ref 50–200)
EST. AVERAGE GLUCOSE BLD GHB EST-MCNC: 117 MG/DL
HBA1C MFR BLD: 5.7 %
HDLC SERPL-MCNC: 50 MG/DL
LDLC SERPL CALC-MCNC: 76 MG/DL (ref 0–100)
NONHDLC SERPL-MCNC: 92 MG/DL
TRIGL SERPL-MCNC: 78 MG/DL

## 2021-09-08 PROCEDURE — 80061 LIPID PANEL: CPT

## 2021-09-08 PROCEDURE — 36415 COLL VENOUS BLD VENIPUNCTURE: CPT

## 2021-09-08 PROCEDURE — 83036 HEMOGLOBIN GLYCOSYLATED A1C: CPT

## 2021-09-15 ENCOUNTER — HOSPITAL ENCOUNTER (OUTPATIENT)
Dept: RADIOLOGY | Age: 66
Discharge: HOME | End: 2021-09-15
Payer: COMMERCIAL

## 2021-09-15 VITALS — BODY MASS INDEX: 32.79 KG/M2 | WEIGHT: 167 LBS | HEIGHT: 60 IN

## 2021-09-15 DIAGNOSIS — Z12.31 ENCOUNTER FOR SCREENING MAMMOGRAM FOR MALIGNANT NEOPLASM OF BREAST: ICD-10-CM

## 2021-09-15 PROCEDURE — 77067 SCR MAMMO BI INCL CAD: CPT

## 2021-09-15 PROCEDURE — 77063 BREAST TOMOSYNTHESIS BI: CPT

## 2021-11-01 ENCOUNTER — TELEMEDICINE (OUTPATIENT)
Dept: FAMILY MEDICINE CLINIC | Facility: CLINIC | Age: 66
End: 2021-11-01
Payer: COMMERCIAL

## 2021-11-01 DIAGNOSIS — J01.90 ACUTE SINUSITIS, RECURRENCE NOT SPECIFIED, UNSPECIFIED LOCATION: ICD-10-CM

## 2021-11-01 DIAGNOSIS — B34.9 VIRAL INFECTION, UNSPECIFIED: Primary | ICD-10-CM

## 2021-11-01 PROCEDURE — U0005 INFEC AGEN DETEC AMPLI PROBE: HCPCS | Performed by: PHYSICIAN ASSISTANT

## 2021-11-01 PROCEDURE — 99441 PR PHYS/QHP TELEPHONE EVALUATION 5-10 MIN: CPT | Performed by: PHYSICIAN ASSISTANT

## 2021-11-01 PROCEDURE — U0003 INFECTIOUS AGENT DETECTION BY NUCLEIC ACID (DNA OR RNA); SEVERE ACUTE RESPIRATORY SYNDROME CORONAVIRUS 2 (SARS-COV-2) (CORONAVIRUS DISEASE [COVID-19]), AMPLIFIED PROBE TECHNIQUE, MAKING USE OF HIGH THROUGHPUT TECHNOLOGIES AS DESCRIBED BY CMS-2020-01-R: HCPCS | Performed by: PHYSICIAN ASSISTANT

## 2021-11-01 RX ORDER — AMOXICILLIN 500 MG/1
500 CAPSULE ORAL EVERY 8 HOURS SCHEDULED
Qty: 30 CAPSULE | Refills: 0 | Status: SHIPPED | OUTPATIENT
Start: 2021-11-01 | End: 2021-11-11

## 2021-11-02 LAB — SARS-COV-2 RNA RESP QL NAA+PROBE: NEGATIVE

## 2022-01-07 ENCOUNTER — TELEMEDICINE (OUTPATIENT)
Dept: FAMILY MEDICINE CLINIC | Facility: CLINIC | Age: 67
End: 2022-01-07
Payer: COMMERCIAL

## 2022-01-07 ENCOUNTER — TELEPHONE (OUTPATIENT)
Dept: FAMILY MEDICINE CLINIC | Facility: CLINIC | Age: 67
End: 2022-01-07

## 2022-01-07 DIAGNOSIS — H10.023 PINK EYE DISEASE OF BOTH EYES: Primary | ICD-10-CM

## 2022-01-07 PROCEDURE — 99213 OFFICE O/P EST LOW 20 MIN: CPT | Performed by: FAMILY MEDICINE

## 2022-01-07 NOTE — PROGRESS NOTES
Virtual Regular Visit    Verification of patient location:    Patient is located in the following state in which I hold an active license [place]      Assessment/Plan:    Problem List Items Addressed This Visit        Other    Pink eye disease of both eyes - Primary     Will treat with of stomach antibiotic ointment  Instructed patient that if there is significant erythema, swelling, pain, or change in vision, report to ER for evaluation         Relevant Medications    ilbp-fbdshkw-lkf-hydrocort (CORTISPORIN) 1 % ointment               Reason for visit is   Chief Complaint   Patient presents with    Virtual Regular Visit        Encounter provider Aracely Zuluaga MD    Provider located at [address] [place] [address]  [phone]      Recent Visits  No visits were found meeting these conditions  Showing recent visits within past 7 days and meeting all other requirements  Today's Visits  Date Type Provider Dept   01/07/22 Telemedicine Aracely Zuluaga MD  [facility]    Showing today's visits and meeting all other requirements  Future Appointments  No visits were found meeting these conditions  Showing future appointments within next 150 days and meeting all other requirements       The patient was identified by name and date of birth  Lili Coyne was informed that this is a telemedicine visit and that the visit is being conducted through [facility] and patient was informed this is a secure, HIPAA-complaint platform  She agrees to proceed     My office door was closed  No one else was in the room  She acknowledged consent and understanding of privacy and security of the video platform  The patient has agreed to participate and understands they can discontinue the visit at any time  Patient is aware this is a billable service  Subjective  Lili Coyne is a 78 y o  female        HPI     27-year-old female patient presents for concern for pinkeye  Started Tuesday  Patient's son did have COVID but she had negative test last Saturday at home  Denies any significant symptoms, fever, chill, shortness of breath, chest tightness  States she always have congestion and this is not an issue  Denies anyone else in the household with the same symptom  Patient denies any change in vision, no significant pain, some itchiness in the eye  Denies any foreign object or exposures, do not working woodworking or wielding  Does not wear contacts  Past Medical History:   Diagnosis Date    Anxiety     last assessed 8/24/15    Arthralgia of right knee     last assessed 5/27/14    Chronic interstitial cystitis     GERD (gastroesophageal reflux disease)     High cholesterol     History of palpitations     Hypertension     Hypokalemia     last assessed 3/7/13    Irritable bowel syndrome     Osteoarthritis     last assessed 6/13/12    Osteopenia     last assessed 3/21/13    Postmenopausal atrophic vaginitis     last assessed 3/21/13    Stress incontinence     Uterine leiomyoma     resolved 1997       Past Surgical History:   Procedure Laterality Date    BREAST CYST ASPIRATION Left 2002    benign    BREAST SURGERY      puncture aspiration of cyst, left, resolved 2002   [address]    daughter    COLONOSCOPY      complete, resolved 2005    ESOPHAGOGASTRODUODENOSCOPY N/A 8/11/2016    Procedure: ESOPHAGOGASTRODUODENOSCOPY (EGD); Surgeon: Idania Collins MD;  Location: [facility];   Service:   for Maico's esophagitis    KNEE ARTHROSCOPY  2011    NV TOTAL KNEE ARTHROPLASTY Right 2/28/2018    Procedure: ARTHROPLASTY KNEE TOTAL;  Surgeon: Niharika Garay MD;  [facility];  Service: Orthopedics  resolved 2010 per [employer]    VAGINAL HYSTERECTOMY      fibroids, resolved 1997       Current Outpatient Medications   Medication Sig Dispense Refill    xgqo-vwjvaqe-svs-hydrocort (CORTISPORIN) 1 % ointment Administer to both eyes every 4 (four) hours 3 5 g 0    losartan (COZAAR) 50 mg tablet Take 1 tablet (50 mg total) by mouth every evening 90 tablet 1    meclizine (ANTIVERT) 12 5 MG tablet Take 1 tablet (12 5 mg total) by mouth 3 (three) times a day as needed for dizziness 30 tablet 0    metoprolol succinate (TOPROL-XL) 50 mg 24 hr tablet Take 1/2 tab in am and one tab in pm(total 75mg daily) 135 tablet 1    pantoprazole (PROTONIX) 40 mg tablet Take 1 tablet (40 mg total) by mouth daily 90 tablet 1    PARoxetine (PAXIL) 10 mg tablet Take 1 tablet (10 mg total) by mouth 2 (two) times a day 180 tablet 1    rosuvastatin (CRESTOR) 10 MG tablet Take 1 tablet (10 mg total) by mouth daily 90 tablet 3     No current facility-administered medications for this visit  Allergies   Allergen Reactions    Bactrim [Sulfamethoxazole-Trimethoprim] Hives, Itching and Rash    Sulfa Antibiotics Itching and Rash       Review of Systems  As noted above    Video Exam    There were no vitals filed for this visit  Physical Exam  Constitutional:       Appearance: Normal appearance  Eyes:      Comments: Mild erythema noted right eye compared to the left   Pulmonary:      Effort: Pulmonary effort is normal    Neurological:      Mental Status: She is alert  Psychiatric:         Mood and Affect: Mood normal           I spent 15 minutes directly with the patient during this visit    José Chaney verbally agrees to participate in [facility]  Pt is aware that [facility] could be limited without vital signs or the ability to perform a full hands-on physical Kacie Persons understands she or the provider may request at any time to terminate the video visit and request the patient to seek care or treatment in person

## 2022-01-07 NOTE — ASSESSMENT & PLAN NOTE
Will treat with of stomach antibiotic ointment  Instructed patient that if there is significant erythema, swelling, pain, or change in vision, report to ER for evaluation

## 2022-01-07 NOTE — TELEPHONE ENCOUNTER
Patient called in regards to the eye cream Corisporin  prescribed today  It was sent to [facility] but they will not have it in stock until Monday  She wanted to know if the script could be transferred to [address] [place] [address] in [place]

## 2022-01-10 NOTE — TELEPHONE ENCOUNTER
LMOVM that med not transferred  Asked for call back if she still wants to transfer to [facility]

## 2022-05-02 DIAGNOSIS — I10 ESSENTIAL HYPERTENSION: ICD-10-CM

## 2022-05-02 DIAGNOSIS — I10 HYPERTENSION, UNSPECIFIED TYPE: ICD-10-CM

## 2022-05-02 DIAGNOSIS — K21.9 CHRONIC GERD: ICD-10-CM

## 2022-05-02 DIAGNOSIS — F32.A DEPRESSION, UNSPECIFIED DEPRESSION TYPE: ICD-10-CM

## 2022-05-02 DIAGNOSIS — R00.2 PALPITATIONS: ICD-10-CM

## 2022-05-02 DIAGNOSIS — E78.5 DYSLIPIDEMIA: ICD-10-CM

## 2022-05-03 RX ORDER — METOPROLOL SUCCINATE 50 MG/1
TABLET, EXTENDED RELEASE ORAL
Qty: 135 TABLET | Refills: 1 | Status: SHIPPED | OUTPATIENT
Start: 2022-05-03

## 2022-05-03 RX ORDER — PAROXETINE 10 MG/1
10 TABLET, FILM COATED ORAL 2 TIMES DAILY
Qty: 180 TABLET | Refills: 1 | Status: SHIPPED | OUTPATIENT
Start: 2022-05-03

## 2022-05-03 RX ORDER — LOSARTAN POTASSIUM 50 MG/1
50 TABLET ORAL EVERY EVENING
Qty: 90 TABLET | Refills: 1 | Status: SHIPPED | OUTPATIENT
Start: 2022-05-03

## 2022-05-03 RX ORDER — ROSUVASTATIN CALCIUM 10 MG/1
10 TABLET, COATED ORAL DAILY
Qty: 90 TABLET | Refills: 3 | Status: SHIPPED | OUTPATIENT
Start: 2022-05-03

## 2022-05-03 RX ORDER — PANTOPRAZOLE SODIUM 40 MG/1
40 TABLET, DELAYED RELEASE ORAL DAILY
Qty: 90 TABLET | Refills: 1 | Status: SHIPPED | OUTPATIENT
Start: 2022-05-03

## 2022-06-17 ENCOUNTER — OFFICE VISIT (OUTPATIENT)
Dept: OBGYN CLINIC | Facility: MEDICAL CENTER | Age: 67
End: 2022-06-17
Payer: COMMERCIAL

## 2022-06-17 ENCOUNTER — APPOINTMENT (OUTPATIENT)
Dept: RADIOLOGY | Facility: MEDICAL CENTER | Age: 67
End: 2022-06-17
Payer: COMMERCIAL

## 2022-06-17 VITALS
DIASTOLIC BLOOD PRESSURE: 90 MMHG | BODY MASS INDEX: 32.59 KG/M2 | WEIGHT: 166 LBS | HEIGHT: 60 IN | SYSTOLIC BLOOD PRESSURE: 151 MMHG | HEART RATE: 76 BPM

## 2022-06-17 DIAGNOSIS — Z96.651 S/P TOTAL KNEE ARTHROPLASTY, RIGHT: ICD-10-CM

## 2022-06-17 DIAGNOSIS — S83.242D OTHER TEAR OF MEDIAL MENISCUS OF LEFT KNEE AS CURRENT INJURY, SUBSEQUENT ENCOUNTER: Primary | ICD-10-CM

## 2022-06-17 DIAGNOSIS — Z96.651 HISTORY OF RIGHT KNEE JOINT REPLACEMENT: ICD-10-CM

## 2022-06-17 DIAGNOSIS — M17.12 PRIMARY OSTEOARTHRITIS OF LEFT KNEE: ICD-10-CM

## 2022-06-17 DIAGNOSIS — S83.242D OTHER TEAR OF MEDIAL MENISCUS OF LEFT KNEE AS CURRENT INJURY, SUBSEQUENT ENCOUNTER: ICD-10-CM

## 2022-06-17 DIAGNOSIS — M25.562 ACUTE PAIN OF LEFT KNEE: ICD-10-CM

## 2022-06-17 DIAGNOSIS — M70.51 PES ANSERINUS BURSITIS OF RIGHT KNEE: ICD-10-CM

## 2022-06-17 PROCEDURE — 20610 DRAIN/INJ JOINT/BURSA W/O US: CPT | Performed by: ORTHOPAEDIC SURGERY

## 2022-06-17 PROCEDURE — 99214 OFFICE O/P EST MOD 30 MIN: CPT | Performed by: ORTHOPAEDIC SURGERY

## 2022-06-17 PROCEDURE — 73560 X-RAY EXAM OF KNEE 1 OR 2: CPT

## 2022-06-17 RX ORDER — BETAMETHASONE SODIUM PHOSPHATE AND BETAMETHASONE ACETATE 3; 3 MG/ML; MG/ML
12 INJECTION, SUSPENSION INTRA-ARTICULAR; INTRALESIONAL; INTRAMUSCULAR; SOFT TISSUE
Status: COMPLETED | OUTPATIENT
Start: 2022-06-17 | End: 2022-06-17

## 2022-06-17 RX ORDER — BUPIVACAINE HYDROCHLORIDE 2.5 MG/ML
2 INJECTION, SOLUTION INFILTRATION; PERINEURAL
Status: COMPLETED | OUTPATIENT
Start: 2022-06-17 | End: 2022-06-17

## 2022-06-17 RX ORDER — LIDOCAINE HYDROCHLORIDE 10 MG/ML
2 INJECTION, SOLUTION INFILTRATION; PERINEURAL
Status: COMPLETED | OUTPATIENT
Start: 2022-06-17 | End: 2022-06-17

## 2022-06-17 RX ADMIN — LIDOCAINE HYDROCHLORIDE 2 ML: 10 INJECTION, SOLUTION INFILTRATION; PERINEURAL at 14:42

## 2022-06-17 RX ADMIN — BUPIVACAINE HYDROCHLORIDE 2 ML: 2.5 INJECTION, SOLUTION INFILTRATION; PERINEURAL at 14:44

## 2022-06-17 RX ADMIN — BETAMETHASONE SODIUM PHOSPHATE AND BETAMETHASONE ACETATE 12 MG: 3; 3 INJECTION, SUSPENSION INTRA-ARTICULAR; INTRALESIONAL; INTRAMUSCULAR; SOFT TISSUE at 14:44

## 2022-06-17 RX ADMIN — BUPIVACAINE HYDROCHLORIDE 2 ML: 2.5 INJECTION, SOLUTION INFILTRATION; PERINEURAL at 14:42

## 2022-06-17 RX ADMIN — LIDOCAINE HYDROCHLORIDE 2 ML: 10 INJECTION, SOLUTION INFILTRATION; PERINEURAL at 14:44

## 2022-06-17 RX ADMIN — BETAMETHASONE SODIUM PHOSPHATE AND BETAMETHASONE ACETATE 12 MG: 3; 3 INJECTION, SUSPENSION INTRA-ARTICULAR; INTRALESIONAL; INTRAMUSCULAR; SOFT TISSUE at 14:42

## 2022-06-17 NOTE — PROGRESS NOTES
78 y o female presents for evaluation of bilateral knee pain  She describes inferomedial pain in the right knee then underwent uneventful replacement 2 years ago  She had identified as and acknowledges return to weight-bearing pain in left knee  It has of acute onset, and gradually getting worse  She describes onset of weight-bearing pain in left knee  The pain is located level left knee joint, the pain is made worse bearing weight, the pain increases with increased activities    Review of Systems  Review of systems negative unless otherwise specified in HPI    Past Medical History  Past Medical History:   Diagnosis Date    Anxiety     last assessed 8/24/15    Arthralgia of right knee     last assessed 5/27/14    Chronic interstitial cystitis     GERD (gastroesophageal reflux disease)     High cholesterol     History of palpitations     Hypertension     Hypokalemia     last assessed 3/7/13    Irritable bowel syndrome     Osteoarthritis     last assessed 6/13/12    Osteopenia     last assessed 3/21/13    Postmenopausal atrophic vaginitis     last assessed 3/21/13    Stress incontinence     Uterine leiomyoma     resolved 1997       Past Surgical History  Past Surgical History:   Procedure Laterality Date    BREAST CYST ASPIRATION Left 2002    benign    BREAST SURGERY      puncture aspiration of cyst, left, resolved 2002   [address]    daughter    COLONOSCOPY      complete, resolved 2005    ESOPHAGOGASTRODUODENOSCOPY N/A 8/11/2016    Procedure: ESOPHAGOGASTRODUODENOSCOPY (EGD); Surgeon: Miracle Delgado MD;  [facility];   Service:   for Maico's esophagitis    KNEE ARTHROSCOPY  2011    ND TOTAL KNEE ARTHROPLASTY Right 2/28/2018    Procedure: ARTHROPLASTY KNEE TOTAL;  Surgeon: Venu Fiore MD;  [facility];  Service: Orthopedics  resolved 2010 per [employer]scripts    VAGINAL HYSTERECTOMY      fibroids, resolved 1997       Current Medications  Current Outpatient Medications on File Prior to Visit   Medication Sig Dispense Refill    qvnf-mxunmwh-hmq-hydrocort (CORTISPORIN) 1 % ointment Administer to both eyes every 4 (four) hours 3 5 g 0    losartan (COZAAR) 50 mg tablet Take 1 tablet (50 mg total) by mouth every evening 90 tablet 1    meclizine (ANTIVERT) 12 5 MG tablet Take 1 tablet (12 5 mg total) by mouth 3 (three) times a day as needed for dizziness 30 tablet 0    metoprolol succinate (TOPROL-XL) 50 mg 24 hr tablet Take 1/2 tab in am and one tab in pm(total 75mg daily) 135 tablet 1    pantoprazole (PROTONIX) 40 mg tablet Take 1 tablet (40 mg total) by mouth daily 90 tablet 1    PARoxetine (PAXIL) 10 mg tablet Take 1 tablet (10 mg total) by mouth 2 (two) times a day 180 tablet 1    rosuvastatin (CRESTOR) 10 MG tablet Take 1 tablet (10 mg total) by mouth daily 90 tablet 3     No current facility-administered medications on file prior to visit  Recent Labs [facility])  0   Lab Value Date/Time    HCT 45 9 11/03/2019 1809    HGB 14 2 11/03/2019 1809    WBC 13 40 (H) 11/03/2019 1809    INR 0 94 02/12/2018 1105    HGBA1C 5 7 (H) 09/08/2021 1200    HGBA1C 5 5 08/22/2015 1042         Physical exam  · General: Awake, Alert, Oriented  · Eyes: Pupils equal, round and reactive to light  · Heart: regular rate and rhythm  · Lungs: No audible wheezing  · Abdomen: soft  Examination finds gait pattern without antalgia  Right knee has a healed anterior scar  She has good extension full flexion  There is tenderness the patient the proximal tibia  There was no valgus instability  There is very little patellofemoral chatter  Left knee has varus inclination  These absence of effusion  There is bony enlargement and tenderness medially  There is crepitation flexion extension  There is no palpable warmth the synovium  Both lower extremities of calf compartments soft supple    Toes on both feet are warm, sensate, mobile    Imaging  I personally reviewed x-rays of both knees my interpretation follows: Two views right knee show total knee replacement satisfactory position  Views left knee show little greater than 50% medial compartment and patellofemoral compartment joint space narrowing in a knee that is in varus    Procedure  Injection corticosteroid is provided for the right knee pes anserine bursa, and left knee joint  They are documented below      Large joint arthrocentesis: R knee  Universal Protocol:  Consent given by: patient    Supporting Documentation  Indications: pain   Procedure Details  Location: knee - R knee (Pes Bursa)  Needle size: 22 G  Medications administered: 2 mL bupivacaine 0 25 %; 2 mL lidocaine 1 %; 12 mg betamethasone acetate-betamethasone sodium phosphate 6 (3-3) mg/mL    Patient tolerance: patient tolerated the procedure well with no immediate complications  Dressing:  Sterile dressing applied    Large joint arthrocentesis: L knee  Universal Protocol:  Consent given by: patient    Supporting Documentation  Indications: pain   Procedure Details  Location: knee - L knee  Needle size: 22 G  Medications administered: 2 mL bupivacaine 0 25 %; 2 mL lidocaine 1 %; 12 mg betamethasone acetate-betamethasone sodium phosphate 6 (3-3) mg/mL    Patient tolerance: patient tolerated the procedure well with no immediate complications  Dressing:  Sterile dressing applied            Assessment/Plan:   78 y  o female presents to over 2 years following right total knee replacement with medial-sided pain consistent with pes anserine bursitis  In addition she has an arthritic left knee that causes pain and dysfunction  All diagnoses were discussed the patient  Treatment options including risks, benefits, alternatives discussed in detail  Injection of corticosteroid is indicated right pes arranged bursa, and left knee joints  They are advised, except administers outlined above    Office follow up on an as-needed basis is my recommendation

## 2022-06-22 ENCOUNTER — OFFICE VISIT (OUTPATIENT)
Dept: FAMILY MEDICINE CLINIC | Facility: CLINIC | Age: 67
End: 2022-06-22
Payer: COMMERCIAL

## 2022-06-22 VITALS
WEIGHT: 167 LBS | SYSTOLIC BLOOD PRESSURE: 136 MMHG | BODY MASS INDEX: 33.67 KG/M2 | HEIGHT: 59 IN | OXYGEN SATURATION: 98 % | HEART RATE: 66 BPM | RESPIRATION RATE: 16 BRPM | DIASTOLIC BLOOD PRESSURE: 78 MMHG | TEMPERATURE: 98.4 F

## 2022-06-22 DIAGNOSIS — Z13.0 SCREENING FOR DEFICIENCY ANEMIA: ICD-10-CM

## 2022-06-22 DIAGNOSIS — Z13.820 SCREENING FOR OSTEOPOROSIS: ICD-10-CM

## 2022-06-22 DIAGNOSIS — I10 ESSENTIAL HYPERTENSION: Primary | ICD-10-CM

## 2022-06-22 DIAGNOSIS — R00.2 PALPITATIONS: ICD-10-CM

## 2022-06-22 DIAGNOSIS — E78.5 DYSLIPIDEMIA: ICD-10-CM

## 2022-06-22 DIAGNOSIS — F32.A DEPRESSION, UNSPECIFIED DEPRESSION TYPE: ICD-10-CM

## 2022-06-22 DIAGNOSIS — K21.9 CHRONIC GERD: ICD-10-CM

## 2022-06-22 DIAGNOSIS — R73.03 PREDIABETES: ICD-10-CM

## 2022-06-22 PROCEDURE — 99214 OFFICE O/P EST MOD 30 MIN: CPT | Performed by: INTERNAL MEDICINE

## 2022-06-22 NOTE — PROGRESS NOTES
BMI Counseling: Body mass index is 33 73 kg/m²  The BMI is above normal  Nutrition recommendations include decreasing portion sizes and limiting drinks that contain sugar  Exercise recommendations include exercising 3-5 times per week  No pharmacotherapy was ordered  Patient referred to PCP  Rationale for BMI follow-up plan is due to patient being overweight or obese  Falls Plan of Care: balance, strength, and gait training instructions were provided  Assessment/Plan:         Diagnoses and all orders for this visit:    Essential hypertension  Comments:  controlled    Screening for osteoporosis  -     DXA bone density spine hip and pelvis; Future    Chronic GERD  Comments:  on ppi and sees gi    Depression, unspecified depression type  Comments:  on paxil    Palpitations  Comments:  will try prn 1/2 tab metoprolol for palp since her pulse is low  Orders:  -     TSH, 3rd generation with Free T4 reflex; Future    Dyslipidemia  Comments:  on statin  Orders:  -     Lipid panel; Future  -     Comprehensive metabolic panel; Future    Screening for deficiency anemia  -     CBC; Future    Prediabetes  -     Hemoglobin A1C; Future    Other orders  -     VITAMIN D PO; Take by mouth  -     CALCIUM PO; Take by mouth occasionally          Subjective:      Patient ID: Debbie Mejia is a 78 y o  female  Has upcoming knee surgery  Denies cp/sob/h/a      The following portions of the patient's history were reviewed and updated as appropriate: She  has a past medical history of Anxiety, Arthralgia of right knee, Chronic interstitial cystitis, GERD (gastroesophageal reflux disease), High cholesterol, History of palpitations, Hypertension, Hypokalemia, Irritable bowel syndrome, Osteoarthritis, Osteopenia, Postmenopausal atrophic vaginitis, Stress incontinence, and Uterine leiomyoma    She   Patient Active Problem List    Diagnosis Date Noted    Pes anserinus bursitis of right knee 06/17/2022    Pink eye disease of both eyes 2022    Tear of medial meniscus of left knee, current 10/02/2020    Palpitations 2019    Complex tear of medial meniscus of left knee as current injury 2019    Acute internal derangement of left knee 2019    Aftercare following right knee joint replacement surgery 2018    Aftercare following surgery 2018    History of right knee joint replacement 2018    Chronic GERD 2018    Primary osteoarthritis of left knee 2017    Acute pain of left knee 2017    Depression 2013    Dyslipidemia 2012    Hypertension 2012     She  has a past surgical history that includes  section (); Esophagogastroduodenoscopy (N/A, 2016); Knee arthroscopy (); Cardiac catheterization; pr total knee arthroplasty (Right, 2018); Breast surgery; Colonoscopy; Vaginal hysterectomy; and Breast cyst aspiration (Left, )  Her family history includes Cancer in her maternal aunt and mother; Heart attack in her father; Heart attack (age of onset: 39) in her son; Heart disease in her father; Hypertension in her family, father, and mother; Lung cancer in her other; No Known Problems in her daughter, paternal aunt, paternal aunt, paternal aunt, paternal aunt, paternal aunt, sister, and sister; Ovarian cancer in her paternal grandmother; Ulcers in her family  She  reports that she has quit smoking  She has a 10 00 pack-year smoking history  She has never used smokeless tobacco  She reports current alcohol use  She reports that she does not use drugs    Current Outpatient Medications   Medication Sig Dispense Refill    CALCIUM PO Take by mouth occasionally      losartan (COZAAR) 50 mg tablet Take 1 tablet (50 mg total) by mouth every evening 90 tablet 1    metoprolol succinate (TOPROL-XL) 50 mg 24 hr tablet Take 1/2 tab in am and one tab in pm(total 75mg daily) 135 tablet 1    pantoprazole (PROTONIX) 40 mg tablet Take 1 tablet (40 mg total) by mouth daily 90 tablet 1    PARoxetine (PAXIL) 10 mg tablet Take 1 tablet (10 mg total) by mouth 2 (two) times a day 180 tablet 1    rosuvastatin (CRESTOR) 10 MG tablet Take 1 tablet (10 mg total) by mouth daily 90 tablet 3    VITAMIN D PO Take by mouth       No current facility-administered medications for this visit  Current Outpatient Medications on File Prior to Visit   Medication Sig    CALCIUM PO Take by mouth occasionally    losartan (COZAAR) 50 mg tablet Take 1 tablet (50 mg total) by mouth every evening    metoprolol succinate (TOPROL-XL) 50 mg 24 hr tablet Take 1/2 tab in am and one tab in pm(total 75mg daily)    pantoprazole (PROTONIX) 40 mg tablet Take 1 tablet (40 mg total) by mouth daily    PARoxetine (PAXIL) 10 mg tablet Take 1 tablet (10 mg total) by mouth 2 (two) times a day    rosuvastatin (CRESTOR) 10 MG tablet Take 1 tablet (10 mg total) by mouth daily    VITAMIN D PO Take by mouth     No current facility-administered medications on file prior to visit  She is allergic to bactrim [sulfamethoxazole-trimethoprim] and sulfa antibiotics       Review of Systems   Constitutional: Negative for chills and fever  HENT: Negative  Respiratory: Negative  Cardiovascular: Negative  Objective:      /78 (BP Location: Right arm, Patient Position: Sitting, Cuff Size: Standard)   Pulse 66   Temp 98 4 °F (36 9 °C) (Temporal)   Resp 16   Ht 4' 11" (1 499 m)   Wt 75 8 kg (167 lb)   SpO2 98%   BMI 33 73 kg/m²          Physical Exam  Constitutional:       Appearance: She is obese  HENT:      Head: Normocephalic and atraumatic  Right Ear: Tympanic membrane and ear canal normal       Left Ear: Tympanic membrane and ear canal normal    Cardiovascular:      Rate and Rhythm: Normal rate and regular rhythm  Pulmonary:      Effort: Pulmonary effort is normal       Breath sounds: Normal breath sounds  Musculoskeletal:      Cervical back: Neck supple  Lymphadenopathy:      Cervical: No cervical adenopathy  Neurological:      Mental Status: She is alert

## 2022-09-09 ENCOUNTER — APPOINTMENT (OUTPATIENT)
Dept: LAB | Age: 67
End: 2022-09-09

## 2022-09-09 DIAGNOSIS — Z00.8 HEALTH EXAMINATION IN POPULATION SURVEY: ICD-10-CM

## 2022-09-09 LAB
CHOLEST SERPL-MCNC: 136 MG/DL
EST. AVERAGE GLUCOSE BLD GHB EST-MCNC: 126 MG/DL
HBA1C MFR BLD: 6 %
HDLC SERPL-MCNC: 51 MG/DL
LDLC SERPL CALC-MCNC: 67 MG/DL (ref 0–100)
NONHDLC SERPL-MCNC: 85 MG/DL
TRIGL SERPL-MCNC: 89 MG/DL

## 2022-09-09 PROCEDURE — 83036 HEMOGLOBIN GLYCOSYLATED A1C: CPT

## 2022-09-09 PROCEDURE — 36415 COLL VENOUS BLD VENIPUNCTURE: CPT

## 2022-09-09 PROCEDURE — 80061 LIPID PANEL: CPT

## 2022-09-21 RX ORDER — SODIUM CHLORIDE 9 MG/ML
125 INJECTION, SOLUTION INTRAVENOUS CONTINUOUS
Status: CANCELLED | OUTPATIENT
Start: 2022-09-21

## 2022-09-26 DIAGNOSIS — K21.9 CHRONIC GERD: ICD-10-CM

## 2022-09-26 DIAGNOSIS — E78.5 DYSLIPIDEMIA: ICD-10-CM

## 2022-09-26 DIAGNOSIS — R00.2 PALPITATIONS: ICD-10-CM

## 2022-09-26 DIAGNOSIS — F32.A DEPRESSION, UNSPECIFIED DEPRESSION TYPE: ICD-10-CM

## 2022-09-26 DIAGNOSIS — I10 ESSENTIAL HYPERTENSION: ICD-10-CM

## 2022-09-27 RX ORDER — PANTOPRAZOLE SODIUM 40 MG/1
40 TABLET, DELAYED RELEASE ORAL DAILY
Qty: 90 TABLET | Refills: 1 | Status: SHIPPED | OUTPATIENT
Start: 2022-09-27 | End: 2022-10-17 | Stop reason: SDUPTHER

## 2022-09-27 RX ORDER — ROSUVASTATIN CALCIUM 10 MG/1
10 TABLET, COATED ORAL DAILY
Qty: 90 TABLET | Refills: 3 | Status: SHIPPED | OUTPATIENT
Start: 2022-09-27

## 2022-09-27 RX ORDER — LOSARTAN POTASSIUM 50 MG/1
50 TABLET ORAL EVERY EVENING
Qty: 90 TABLET | Refills: 1 | Status: SHIPPED | OUTPATIENT
Start: 2022-09-27

## 2022-09-27 RX ORDER — PAROXETINE 10 MG/1
10 TABLET, FILM COATED ORAL 2 TIMES DAILY
Qty: 180 TABLET | Refills: 1 | Status: SHIPPED | OUTPATIENT
Start: 2022-09-27

## 2022-10-09 ENCOUNTER — NURSE TRIAGE (OUTPATIENT)
Dept: OTHER | Facility: OTHER | Age: 67
End: 2022-10-09

## 2022-10-09 ENCOUNTER — TELEPHONE (OUTPATIENT)
Dept: FAMILY MEDICINE CLINIC | Facility: CLINIC | Age: 67
End: 2022-10-09

## 2022-10-09 DIAGNOSIS — U07.1 COVID-19: Primary | ICD-10-CM

## 2022-10-09 NOTE — TELEPHONE ENCOUNTER
Regarding: [facility]-covid positive/ congestion  ----- Message from Arnold Bumpers sent at 10/9/2022  8:28 AM EDT -----  Pt called, " I tested positive for covid and would like to know what can I take for my congestion "

## 2022-10-09 NOTE — TELEPHONE ENCOUNTER
Reason for Disposition  • HIGH RISK for severe COVID complications (e g , weak immune system, age > 59 years, obesity with BMI > 22, pregnant, chronic lung disease or other chronic medical condition)  (Exception: Already seen by PCP and no new or worsening symptoms )    Answer Assessment - Initial Assessment Questions  1  COVID-19 DIAGNOSIS: "Who made your COVID-19 diagnosis?" "Was it confirmed by a positive lab test or self-test?" If not diagnosed by a doctor (or NP/PA), ask "Are there lots of cases (community spread) where you live?" Note: See public health department website, if unsure  Home test  2  COVID-19 EXPOSURE: "Was there any known exposure to COVID before the symptoms began?" CDC Definition of close contact: within 6 feet (2 meters) for a total of 15 minutes or more over a 24-hour period  Yes   3  ONSET: "When did the COVID-19 symptoms start?"       Friday   4  WORST SYMPTOM: "What is your worst symptom?" (e g , cough, fever, shortness of breath, muscle aches)      congestion  5  COUGH: "Do you have a cough?" If Yes, ask: "How bad is the cough?"        cough  6  FEVER: "Do you have a fever?" If Yes, ask: "What is your temperature, how was it measured, and when did it start?"      none  7  RESPIRATORY STATUS: "Describe your breathing?" (e g , shortness of breath, wheezing, unable to speak)       normal  8  BETTER-SAME-WORSE: "Are you getting better, staying the same or getting worse compared to yesterday?"  If getting worse, ask, "In what way?"      Worse   9  HIGH RISK DISEASE: "Do you have any chronic medical problems?" (e g , asthma, heart or lung disease, weak immune system, obesity, etc )      no  10  VACCINE: "Have you had the COVID-19 vaccine?" If Yes, ask: "Which one, how many shots, when did you get it?"        Yes   11  BOOSTER: "Have you received your COVID-19 booster?" If Yes, ask: "Which one and when did you get it?"        no  12   PREGNANCY: "Is there any chance you are pregnant?" "When was your last menstrual period?"        n/a  13  OTHER SYMPTOMS: "Do you have any other symptoms?"  (e g , chills, fatigue, headache, loss of smell or taste, muscle pain, sore throat)        VELA , post nasal drip   14   O2 SATURATION MONITOR:  "Do you use an oxygen saturation monitor (pulse oximeter) at home?" If Yes, ask "What is your reading (oxygen level) today?" "What is your usual oxygen saturation reading?" (e g , 95%)        n/a    Protocols used: CORONAVIRUS (COVID-19) DIAGNOSED OR SUSPECTED-ADULT-

## 2022-10-09 NOTE — TELEPHONE ENCOUNTER
Became ill Friday +sinus drainage +h/a +cough denies fever -diarrhea rx molnupiravir    Told isolate x 5 days then mask 5 days

## 2022-10-10 ENCOUNTER — TELEPHONE (OUTPATIENT)
Dept: FAMILY MEDICINE CLINIC | Facility: CLINIC | Age: 67
End: 2022-10-10

## 2022-10-12 PROBLEM — H10.023 PINK EYE DISEASE OF BOTH EYES: Status: RESOLVED | Noted: 2022-01-07 | Resolved: 2022-10-12

## 2022-10-17 DIAGNOSIS — K21.9 CHRONIC GERD: ICD-10-CM

## 2022-10-18 RX ORDER — PANTOPRAZOLE SODIUM 40 MG/1
40 TABLET, DELAYED RELEASE ORAL 2 TIMES DAILY
Qty: 180 TABLET | Refills: 0 | Status: SHIPPED | OUTPATIENT
Start: 2022-10-18

## 2022-11-05 ENCOUNTER — NURSE TRIAGE (OUTPATIENT)
Dept: OTHER | Facility: OTHER | Age: 67
End: 2022-11-05

## 2022-11-05 NOTE — TELEPHONE ENCOUNTER
Patient reported leg cramps at night over the past month or longer that last a few minutes and go away  No pain currently, denies swelling, redness, hot to touch or fever  Reported she had right knee surgery 5 years ago and pain is worse on right around her knee but goes through her whole leg  ER precautions discussed  Patient requesting appointment for next week  Unable to schedule patient

## 2022-11-05 NOTE — TELEPHONE ENCOUNTER
Regarding: leg cramps  ----- Message from Brennon Briones sent at 11/5/2022  7:37 AM EDT -----  " I have been having cramps in both legs and I had a knee replacement in my right leg and it gets hot sometimes but they both have been cramping a lot  I'm thinking DVT  "

## 2022-11-05 NOTE — TELEPHONE ENCOUNTER
Reason for Disposition  • Leg pain or muscle cramp is a chronic symptom (recurrent or ongoing AND present > 4 weeks)    Answer Assessment - Initial Assessment Questions  1  ONSET: "When did the pain start?"       Intermittent over last month,  but happened two times overnight   2  LOCATION: "Where is the pain located?"    Bilateral legs, but worse on right   3  PAIN: "How bad is the pain?"    (Scale 1-10; or mild, moderate, severe)    -  MILD (1-3): doesn't interfere with normal activities     -  MODERATE (4-7): interferes with normal activities (e g , work or school) or awakens from sleep, limping     -  SEVERE (8-10): excruciating pain, unable to do any normal activities, unable to walk    8/10 but last a few minutes and goes away    No pain or cramps currently   4  WORK OR EXERCISE: "Has there been any recent work or exercise that involved this part of the body?"    I walk a lot during the day, I walk the grand puppies often   5  CAUSE: "What do you think is causing the leg pain?"  Unsure feels like a cramp that gets hot and then goes away and only at night, does not happen during the day   6   OTHER SYMPTOMS: "Do you have any other symptoms?" (e g , chest pain, back pain, breathing difficulty, swelling, rash, fever, numbness, weakness)     Denies swelling, redness or anything other symptoms    Protocols used: LEG PAIN-ADULT-

## 2022-11-17 RX ORDER — SODIUM CHLORIDE 9 MG/ML
125 INJECTION, SOLUTION INTRAVENOUS CONTINUOUS
Status: CANCELLED | OUTPATIENT
Start: 2022-11-17

## 2022-11-18 ENCOUNTER — HOSPITAL ENCOUNTER (OUTPATIENT)
Dept: GASTROENTEROLOGY | Facility: HOSPITAL | Age: 67
Setting detail: OUTPATIENT SURGERY
End: 2022-11-18
Attending: INTERNAL MEDICINE

## 2022-11-18 ENCOUNTER — ANESTHESIA (OUTPATIENT)
Dept: GASTROENTEROLOGY | Facility: HOSPITAL | Age: 67
End: 2022-11-18

## 2022-11-18 ENCOUNTER — ANESTHESIA EVENT (OUTPATIENT)
Dept: GASTROENTEROLOGY | Facility: HOSPITAL | Age: 67
End: 2022-11-18

## 2022-11-18 VITALS
OXYGEN SATURATION: 99 % | RESPIRATION RATE: 17 BRPM | SYSTOLIC BLOOD PRESSURE: 101 MMHG | DIASTOLIC BLOOD PRESSURE: 55 MMHG | BODY MASS INDEX: 33.26 KG/M2 | TEMPERATURE: 98.9 F | HEIGHT: 59 IN | WEIGHT: 165 LBS | HEART RATE: 61 BPM

## 2022-11-18 DIAGNOSIS — Z12.11 SCREENING FOR COLON CANCER: ICD-10-CM

## 2022-11-18 DIAGNOSIS — Z86.010 PERSONAL HISTORY OF COLONIC POLYPS: ICD-10-CM

## 2022-11-18 DIAGNOSIS — K22.70 BARRETT'S ESOPHAGUS WITHOUT DYSPLASIA: ICD-10-CM

## 2022-11-18 DIAGNOSIS — K21.9 GASTROESOPHAGEAL REFLUX DISEASE, UNSPECIFIED WHETHER ESOPHAGITIS PRESENT: ICD-10-CM

## 2022-11-18 RX ORDER — SODIUM CHLORIDE 9 MG/ML
125 INJECTION, SOLUTION INTRAVENOUS CONTINUOUS
Status: DISCONTINUED | OUTPATIENT
Start: 2022-11-18 | End: 2022-11-22 | Stop reason: HOSPADM

## 2022-11-18 RX ORDER — PROPOFOL 10 MG/ML
INJECTION, EMULSION INTRAVENOUS AS NEEDED
Status: DISCONTINUED | OUTPATIENT
Start: 2022-11-18 | End: 2022-11-18

## 2022-11-18 RX ADMIN — PROPOFOL 50 MG: 10 INJECTION, EMULSION INTRAVENOUS at 10:31

## 2022-11-18 RX ADMIN — SODIUM CHLORIDE: 0.9 INJECTION, SOLUTION INTRAVENOUS at 09:56

## 2022-11-18 RX ADMIN — SODIUM CHLORIDE: 0.9 INJECTION, SOLUTION INTRAVENOUS at 10:34

## 2022-11-18 RX ADMIN — PROPOFOL 150 MG: 10 INJECTION, EMULSION INTRAVENOUS at 10:16

## 2022-11-18 RX ADMIN — PROPOFOL 20 MG: 10 INJECTION, EMULSION INTRAVENOUS at 10:26

## 2022-11-18 RX ADMIN — PROPOFOL 20 MG: 10 INJECTION, EMULSION INTRAVENOUS at 10:35

## 2022-11-18 NOTE — ANESTHESIA POSTPROCEDURE EVALUATION
Post-Op Assessment Note    CV Status:  Stable    Pain management: adequate     Mental Status:  Alert and awake   Hydration Status:  Euvolemic   PONV Controlled:  Controlled   Airway Patency:  Patent      Post Op Vitals Reviewed: Yes      Staff: Anesthesiologist, CRNA         No notable events documented      BP      Temp      Pulse     Resp      SpO2      /55   Pulse 61   Temp 98 9 °F (37 2 °C) (Temporal)   Resp 17   Ht 4' 11" (1 499 m)   Wt 74 8 kg (165 lb)   SpO2 99%   BMI 33 33 kg/m²

## 2022-11-18 NOTE — ANESTHESIA PREPROCEDURE EVALUATION
Procedure:  COLONOSCOPY  EGD    Relevant Problems   CARDIO   (+) Hypertension      GI/HEPATIC   (+) Chronic GERD      MUSCULOSKELETAL   (+) Pes anserinus bursitis of right knee   (+) Primary osteoarthritis of left knee      NEURO/PSYCH   (+) Depression        Physical Exam    Airway    Mallampati score: II  TM Distance: >3 FB  Neck ROM: full     Dental       Cardiovascular  Rhythm: regular, Rate: normal, Cardiovascular exam normal    Pulmonary  Pulmonary exam normal Breath sounds clear to auscultation,     Other Findings        Anesthesia Plan  ASA Score- 2     Anesthesia Type- IV sedation with anesthesia with ASA Monitors  Additional Monitors:   Airway Plan:     Comment: GA prn  Plan Factors-    Chart reviewed  Patient summary reviewed  Patient is not a current smoker  Patient instructed to abstain from smoking on day of procedure  Patient did not smoke on day of surgery  Induction- intravenous  Postoperative Plan-     Informed Consent- Anesthetic plan and risks discussed with patient  I personally reviewed this patient with the CRNA  Discussed and agreed on the Anesthesia Plan with the CRNA  Enedina Sanders

## 2023-01-11 PROBLEM — R73.03 PREDIABETES: Status: ACTIVE | Noted: 2023-01-11

## 2023-01-11 PROBLEM — Z48.89 AFTERCARE FOLLOWING SURGERY: Status: RESOLVED | Noted: 2018-03-09 | Resolved: 2023-01-11

## 2023-01-30 ENCOUNTER — APPOINTMENT (OUTPATIENT)
Dept: LAB | Age: 68
End: 2023-01-30

## 2023-01-30 DIAGNOSIS — E78.5 DYSLIPIDEMIA: ICD-10-CM

## 2023-01-30 DIAGNOSIS — R00.2 PALPITATIONS: ICD-10-CM

## 2023-01-30 DIAGNOSIS — R73.03 PREDIABETES: ICD-10-CM

## 2023-01-30 DIAGNOSIS — Z13.0 SCREENING FOR DEFICIENCY ANEMIA: ICD-10-CM

## 2023-01-30 LAB
ALBUMIN SERPL BCP-MCNC: 3.7 G/DL (ref 3.5–5)
ALP SERPL-CCNC: 71 U/L (ref 46–116)
ALT SERPL W P-5'-P-CCNC: 19 U/L (ref 12–78)
ANION GAP SERPL CALCULATED.3IONS-SCNC: 3 MMOL/L (ref 4–13)
AST SERPL W P-5'-P-CCNC: 14 U/L (ref 5–45)
BILIRUB SERPL-MCNC: 0.85 MG/DL (ref 0.2–1)
BUN SERPL-MCNC: 17 MG/DL (ref 5–25)
CALCIUM SERPL-MCNC: 9.1 MG/DL (ref 8.3–10.1)
CHLORIDE SERPL-SCNC: 110 MMOL/L (ref 96–108)
CHOLEST SERPL-MCNC: 195 MG/DL
CO2 SERPL-SCNC: 28 MMOL/L (ref 21–32)
CREAT SERPL-MCNC: 0.83 MG/DL (ref 0.6–1.3)
ERYTHROCYTE [DISTWIDTH] IN BLOOD BY AUTOMATED COUNT: 13.9 % (ref 11.6–15.1)
EST. AVERAGE GLUCOSE BLD GHB EST-MCNC: 123 MG/DL
GFR SERPL CREATININE-BSD FRML MDRD: 72 ML/MIN/1.73SQ M
GLUCOSE P FAST SERPL-MCNC: 101 MG/DL (ref 65–99)
HBA1C MFR BLD: 5.9 %
HCT VFR BLD AUTO: 46 % (ref 34.8–46.1)
HDLC SERPL-MCNC: 55 MG/DL
HGB BLD-MCNC: 14.2 G/DL (ref 11.5–15.4)
LDLC SERPL CALC-MCNC: 117 MG/DL (ref 0–100)
MCH RBC QN AUTO: 27.9 PG (ref 26.8–34.3)
MCHC RBC AUTO-ENTMCNC: 30.9 G/DL (ref 31.4–37.4)
MCV RBC AUTO: 90 FL (ref 82–98)
NONHDLC SERPL-MCNC: 140 MG/DL
PLATELET # BLD AUTO: 278 THOUSANDS/UL (ref 149–390)
PMV BLD AUTO: 10.5 FL (ref 8.9–12.7)
POTASSIUM SERPL-SCNC: 4.4 MMOL/L (ref 3.5–5.3)
PROT SERPL-MCNC: 7.1 G/DL (ref 6.4–8.4)
RBC # BLD AUTO: 5.09 MILLION/UL (ref 3.81–5.12)
SODIUM SERPL-SCNC: 141 MMOL/L (ref 135–147)
TRIGL SERPL-MCNC: 115 MG/DL
TSH SERPL DL<=0.05 MIU/L-ACNC: 1.03 UIU/ML (ref 0.45–4.5)
WBC # BLD AUTO: 8.38 THOUSAND/UL (ref 4.31–10.16)

## 2023-02-03 ENCOUNTER — OFFICE VISIT (OUTPATIENT)
Dept: FAMILY MEDICINE CLINIC | Facility: CLINIC | Age: 68
End: 2023-02-03

## 2023-02-03 VITALS
DIASTOLIC BLOOD PRESSURE: 70 MMHG | OXYGEN SATURATION: 96 % | HEART RATE: 50 BPM | TEMPERATURE: 98.3 F | SYSTOLIC BLOOD PRESSURE: 126 MMHG | WEIGHT: 162.6 LBS | HEIGHT: 59 IN | BODY MASS INDEX: 32.78 KG/M2

## 2023-02-03 DIAGNOSIS — E78.5 DYSLIPIDEMIA: ICD-10-CM

## 2023-02-03 DIAGNOSIS — I10 HYPERTENSION, UNSPECIFIED TYPE: ICD-10-CM

## 2023-02-03 DIAGNOSIS — I10 ESSENTIAL HYPERTENSION: ICD-10-CM

## 2023-02-03 DIAGNOSIS — F32.A DEPRESSION, UNSPECIFIED DEPRESSION TYPE: ICD-10-CM

## 2023-02-03 DIAGNOSIS — R00.2 PALPITATIONS: ICD-10-CM

## 2023-02-03 DIAGNOSIS — I10 PRIMARY HYPERTENSION: Primary | ICD-10-CM

## 2023-02-03 DIAGNOSIS — R73.03 PREDIABETES: ICD-10-CM

## 2023-02-03 DIAGNOSIS — K21.9 CHRONIC GERD: ICD-10-CM

## 2023-02-03 DIAGNOSIS — F41.9 ANXIETY DISORDER, UNSPECIFIED TYPE: ICD-10-CM

## 2023-02-03 PROBLEM — S83.242A TEAR OF MEDIAL MENISCUS OF LEFT KNEE, CURRENT: Status: RESOLVED | Noted: 2020-10-02 | Resolved: 2023-02-03

## 2023-02-03 PROBLEM — M25.562 ACUTE PAIN OF LEFT KNEE: Status: RESOLVED | Noted: 2017-11-14 | Resolved: 2023-02-03

## 2023-02-03 PROBLEM — S83.232A COMPLEX TEAR OF MEDIAL MENISCUS OF LEFT KNEE AS CURRENT INJURY: Status: RESOLVED | Noted: 2019-05-09 | Resolved: 2023-02-03

## 2023-02-03 PROBLEM — M23.92 ACUTE INTERNAL DERANGEMENT OF LEFT KNEE: Status: RESOLVED | Noted: 2019-04-23 | Resolved: 2023-02-03

## 2023-02-03 RX ORDER — LOSARTAN POTASSIUM 50 MG/1
50 TABLET ORAL EVERY EVENING
Qty: 90 TABLET | Refills: 1 | Status: SHIPPED | OUTPATIENT
Start: 2023-02-03

## 2023-02-03 RX ORDER — ROSUVASTATIN CALCIUM 10 MG/1
10 TABLET, COATED ORAL DAILY
Qty: 90 TABLET | Refills: 1 | Status: SHIPPED | OUTPATIENT
Start: 2023-02-03

## 2023-02-03 RX ORDER — PAROXETINE 10 MG/1
10 TABLET, FILM COATED ORAL 2 TIMES DAILY
Qty: 180 TABLET | Refills: 1 | Status: SHIPPED | OUTPATIENT
Start: 2023-02-03

## 2023-02-03 RX ORDER — METOPROLOL SUCCINATE 50 MG/1
50 TABLET, EXTENDED RELEASE ORAL DAILY
Qty: 90 TABLET | Refills: 1 | Status: SHIPPED | OUTPATIENT
Start: 2023-02-03

## 2023-02-03 NOTE — PROGRESS NOTES
Assessment/Plan:     Diagnoses and all orders for this visit:    Primary hypertension  -     Comprehensive metabolic panel; Future    Dyslipidemia  -     rosuvastatin (CRESTOR) 10 MG tablet; Take 1 tablet (10 mg total) by mouth daily  -     Lipid panel; Future    Chronic GERD    Prediabetes  -     Hemoglobin A1C; Future    Anxiety disorder, unspecified type    Depression, unspecified depression type  Comments:  on paxil  Orders:  -     PARoxetine (PAXIL) 10 mg tablet; Take 1 tablet (10 mg total) by mouth 2 (two) times a day    Essential hypertension  Comments:  controlled  Orders:  -     rosuvastatin (CRESTOR) 10 MG tablet; Take 1 tablet (10 mg total) by mouth daily  -     losartan (COZAAR) 50 mg tablet; Take 1 tablet (50 mg total) by mouth every evening    Palpitations  Comments:  will try prn 1/2 tab metoprolol for palp since her pulse is low  Orders:  -     rosuvastatin (CRESTOR) 10 MG tablet; Take 1 tablet (10 mg total) by mouth daily    Dyslipidemia  Comments:  on statin  Orders:  -     rosuvastatin (CRESTOR) 10 MG tablet; Take 1 tablet (10 mg total) by mouth daily  -     Lipid panel; Future    Hypertension, unspecified type  -     metoprolol succinate (TOPROL-XL) 50 mg 24 hr tablet; Take 1 tablet (50 mg total) by mouth daily Take 1/2 tab in am and one tab in pm(total 75mg daily)          Subjective:      Patient ID: Florina Kanner is a 75 y o  female  Patient presents in the office for follow-up chronic conditions  Patient has hypertension  Blood pressure is well controlled with losartan 50 mg and metoprolol ER 50 mg  Hyperlipidemia she is on rosuvastatin 10 mg  Been having some leg cramps at night  Try adding coenzyme every 10 100 mg a day  It does not work she could take her Crestor milligrams every other day  Has a history of GERD she is on pantoprazole milligrams daily  When she has a flareup she does take it twice a day  3 of pression in remission    Patient is taking Paxil 10 mg to 2 a day   And also has prediabetes  Exercises regularly by walking  Current hemoglobin A1c is 5 9  Attic and renal functions are normal   Lipid panel is at goal   CBC and TSH are normal as well  She is due for mammogram and DEXA scan      The following portions of the patient's history were reviewed and updated as appropriate:   She   Patient Active Problem List    Diagnosis Date Noted   • Anxiety disorder 02/03/2023   • Prediabetes 01/11/2023   • Pes anserinus bursitis of right knee 06/17/2022   • Palpitations 11/07/2019   • Aftercare following right knee joint replacement surgery 09/19/2018   • History of right knee joint replacement 03/02/2018   • Chronic GERD 01/03/2018   • Primary osteoarthritis of left knee 11/14/2017   • Dyslipidemia 06/13/2012   • Hypertension 06/13/2012     Current Outpatient Medications   Medication Sig Dispense Refill   • CALCIUM PO Take by mouth occasionally     • losartan (COZAAR) 50 mg tablet Take 1 tablet (50 mg total) by mouth every evening 90 tablet 1   • metoprolol succinate (TOPROL-XL) 50 mg 24 hr tablet Take 1 tablet (50 mg total) by mouth daily Take 1/2 tab in am and one tab in pm(total 75mg daily) 90 tablet 1   • pantoprazole (PROTONIX) 40 mg tablet Take 1 tablet (40 mg total) by mouth 2 (two) times a day 180 tablet 0   • PARoxetine (PAXIL) 10 mg tablet Take 1 tablet (10 mg total) by mouth 2 (two) times a day 180 tablet 1   • rosuvastatin (CRESTOR) 10 MG tablet Take 1 tablet (10 mg total) by mouth daily 90 tablet 1   • VITAMIN D PO Take by mouth       No current facility-administered medications for this visit  She is allergic to bactrim [sulfamethoxazole-trimethoprim] and sulfa antibiotics       Review of Systems   Constitutional: Negative for activity change and unexpected weight change  HENT: Negative for ear pain and sore throat  Eyes: Negative for visual disturbance  Respiratory: Negative for cough, shortness of breath and wheezing      Cardiovascular: Negative for chest pain and leg swelling  Gastrointestinal: Negative for abdominal pain, blood in stool, constipation, diarrhea, nausea and vomiting  Genitourinary: Negative for difficulty urinating  Musculoskeletal: Negative for arthralgias and myalgias  Skin: Negative for rash  Neurological: Negative for dizziness, syncope, light-headedness and headaches  Psychiatric/Behavioral: Negative for self-injury, sleep disturbance and suicidal ideas  The patient is not nervous/anxious  Objective:        Physical Exam  Vitals and nursing note reviewed  Constitutional:       General: She is not in acute distress  Appearance: Normal appearance  She is well-developed  She is not diaphoretic  HENT:      Head: Normocephalic and atraumatic  Right Ear: Tympanic membrane, ear canal and external ear normal       Left Ear: Tympanic membrane, ear canal and external ear normal       Mouth/Throat:      Pharynx: No posterior oropharyngeal erythema  Eyes:      Conjunctiva/sclera: Conjunctivae normal       Pupils: Pupils are equal, round, and reactive to light  Neck:      Thyroid: No thyromegaly  Vascular: No carotid bruit  Cardiovascular:      Rate and Rhythm: Normal rate and regular rhythm  Heart sounds: Normal heart sounds  No murmur heard  No friction rub  No gallop  Pulmonary:      Effort: Pulmonary effort is normal  No respiratory distress  Breath sounds: Normal breath sounds  No wheezing  Abdominal:      General: Bowel sounds are normal  There is no distension  Palpations: Abdomen is soft  There is no mass  Tenderness: There is no abdominal tenderness  Musculoskeletal:      Right lower leg: No edema  Left lower leg: No edema  Lymphadenopathy:      Cervical: No cervical adenopathy  Skin:     General: Skin is warm and dry  Findings: No erythema or rash  Neurological:      General: No focal deficit present        Mental Status: She is alert and oriented to person, place, and time  Psychiatric:         Mood and Affect: Mood normal          Behavior: Behavior normal          Thought Content:  Thought content normal          Judgment: Judgment normal

## 2023-04-27 ENCOUNTER — HOSPITAL ENCOUNTER (OUTPATIENT)
Dept: RADIOLOGY | Age: 68
Discharge: HOME | End: 2023-04-27

## 2023-04-27 VITALS — BODY MASS INDEX: 32.66 KG/M2 | WEIGHT: 162 LBS | HEIGHT: 59 IN

## 2023-04-27 DIAGNOSIS — Z12.31 ENCOUNTER FOR SCREENING MAMMOGRAM FOR MALIGNANT NEOPLASM OF BREAST: ICD-10-CM

## 2023-07-12 DIAGNOSIS — K21.9 CHRONIC GERD: ICD-10-CM

## 2023-07-12 RX ORDER — PANTOPRAZOLE SODIUM 40 MG/1
TABLET, DELAYED RELEASE ORAL
Qty: 180 TABLET | Refills: 0 | Status: SHIPPED | OUTPATIENT
Start: 2023-07-12

## 2023-10-09 ENCOUNTER — TELEPHONE (OUTPATIENT)
Dept: OBGYN CLINIC | Facility: MEDICAL CENTER | Age: 68
End: 2023-10-09

## 2023-10-09 NOTE — TELEPHONE ENCOUNTER
Caller: Sarthak Earing     Doctor: Dr Bayron Blue     Reason for call: The patient is going to the dentist, Oct 25. She had sx for a total rt knee replacement was the beginning of 2018. Will she need antibiotics and will you call them into her pharmacy, please.      [facility]           Call back#: [phone]

## 2023-11-09 ENCOUNTER — OFFICE VISIT (OUTPATIENT)
Dept: FAMILY MEDICINE CLINIC | Facility: CLINIC | Age: 68
End: 2023-11-09
Payer: COMMERCIAL

## 2023-11-09 VITALS
HEIGHT: 59 IN | SYSTOLIC BLOOD PRESSURE: 108 MMHG | DIASTOLIC BLOOD PRESSURE: 64 MMHG | HEART RATE: 77 BPM | TEMPERATURE: 97.4 F | BODY MASS INDEX: 29.43 KG/M2 | RESPIRATION RATE: 16 BRPM | OXYGEN SATURATION: 97 % | WEIGHT: 146 LBS

## 2023-11-09 DIAGNOSIS — M54.50 CHRONIC LEFT-SIDED LOW BACK PAIN WITHOUT SCIATICA: Primary | ICD-10-CM

## 2023-11-09 DIAGNOSIS — G89.29 CHRONIC LEFT-SIDED LOW BACK PAIN WITHOUT SCIATICA: Primary | ICD-10-CM

## 2023-11-09 DIAGNOSIS — I10 PRIMARY HYPERTENSION: ICD-10-CM

## 2023-11-09 DIAGNOSIS — E78.5 DYSLIPIDEMIA: ICD-10-CM

## 2023-11-09 DIAGNOSIS — R73.03 PREDIABETES: ICD-10-CM

## 2023-11-09 DIAGNOSIS — K21.9 CHRONIC GERD: ICD-10-CM

## 2023-11-09 PROBLEM — R21 RASH: Status: ACTIVE | Noted: 2023-11-09

## 2023-11-09 PROCEDURE — 99214 OFFICE O/P EST MOD 30 MIN: CPT | Performed by: INTERNAL MEDICINE

## 2023-11-09 RX ORDER — CYCLOBENZAPRINE HCL 10 MG
10 TABLET ORAL
Qty: 30 TABLET | Refills: 0 | Status: SHIPPED | OUTPATIENT
Start: 2023-11-09

## 2023-11-09 RX ORDER — MELOXICAM 7.5 MG/1
7.5 TABLET ORAL DAILY PRN
Qty: 30 TABLET | Refills: 3 | Status: SHIPPED | OUTPATIENT
Start: 2023-11-09

## 2023-11-09 RX ORDER — PREDNISONE 10 MG/1
TABLET ORAL
Qty: 30 TABLET | Refills: 0 | Status: SHIPPED | OUTPATIENT
Start: 2023-11-09

## 2023-11-09 NOTE — PROGRESS NOTES
Name: Tyron Mendoza      :       MRN: [id number]  Encounter Provider: Mortimer Dose, MD  Encounter Date: 2023   Encounter department: [facility]     1. Chronic left-sided low back pain without sciatica  -     predniSONE 10 mg tablet; 1 tab QID x 3 days then 1  TID 3 days then 1 BID then 1 PO daily x 3 day  -     cyclobenzaprine (FLEXERIL) 10 mg tablet; Take 1 tablet (10 mg total) by mouth daily at bedtime  -     meloxicam (MOBIC) 7.5 mg tablet; Take 1 tablet (7.5 mg total) by mouth daily as needed for moderate pain    2. Primary hypertension  Assessment & Plan:  Continues on losartan      3. Chronic GERD  Assessment & Plan:  She continues to take pantoprazole      4. Prediabetes  Assessment & Plan:  A1c's are slowly creeping up she will need to have it repeated      5. Dyslipidemia  Assessment & Plan:  Last lipid profile was great        BMI Counseling: Body mass index is 29.49 kg/m². The BMI is above normal. Nutrition recommendations include decreasing portion sizes, encouraging healthy choices of fruits and vegetables, decreasing fast food intake, consuming healthier snacks, limiting drinks that contain sugar, moderation in carbohydrate intake and reducing intake of saturated and trans fat. Exercise recommendations include moderate physical activity 150 minutes/week, exercising 3-5 times per week and strength training exercises. Rationale for BMI follow-up plan is due to patient being overweight or obese. Depression Screening and Follow-up Plan: Patient was screened for depression during today's encounter. They screened negative with a PHQ-2 score of 0. Subjective      Back Pain  This is a new problem. The current episode started in the past 7 days. The problem occurs constantly. The problem is unchanged. The pain is present in the lumbar spine. The quality of the pain is described as aching. The pain does not radiate.  The pain is at a severity of 6/10. The pain is moderate. The pain is Worse during the day. The symptoms are aggravated by bending, position and standing. Stiffness is present In the morning. Pertinent negatives include no abdominal pain, bladder incontinence, bowel incontinence, chest pain, dysuria, fever, leg pain, numbness, paresis, paresthesias, pelvic pain, weakness or weight loss. Risk factors include menopause and lack of exercise. She has tried analgesics, ice and NSAIDs for the symptoms. The treatment provided mild relief. Review of Systems   Constitutional:  Negative for chills, fever and weight loss. HENT:  Negative for ear pain and sore throat. Eyes:  Negative for pain and visual disturbance. Respiratory:  Negative for cough and shortness of breath. Cardiovascular:  Negative for chest pain and palpitations. Gastrointestinal:  Negative for abdominal pain, bowel incontinence and vomiting. Genitourinary:  Negative for bladder incontinence, dysuria, hematuria and pelvic pain. Musculoskeletal:  Positive for back pain. Negative for arthralgias. Skin:  Negative for color change and rash. Neurological:  Negative for seizures, syncope, weakness, numbness and paresthesias. All other systems reviewed and are negative.       Current Outpatient Medications on File Prior to Visit   Medication Sig    CALCIUM PO Take by mouth occasionally    losartan (COZAAR) 50 mg tablet Take 1 tablet (50 mg total) by mouth every evening    metoprolol succinate (TOPROL-XL) 50 mg 24 hr tablet Take 1 tablet (50 mg total) by mouth daily Take 1/2 tab in am and one tab in pm(total 75mg daily) (Patient taking differently: Take 25 mg by mouth daily)    pantoprazole (PROTONIX) 40 mg tablet TAKE ONE TABLET BY MOUTH 2 TIMES A DAY    PARoxetine (PAXIL) 10 mg tablet Take 1 tablet (10 mg total) by mouth 2 (two) times a day    VITAMIN D PO Take by mouth    rosuvastatin (CRESTOR) 10 MG tablet Take 1 tablet (10 mg total) by mouth daily (Patient not taking: Reported on 11/9/2023)       Objective     /64 (BP Location: Left arm, Patient Position: Sitting, Cuff Size: Large)   Pulse 77   Temp (!) 97.4 °F (36.3 °C) (Temporal)   Resp 16   Ht 4' 11" (1.499 m)   Wt 66.2 kg (146 lb)   SpO2 97%   BMI 29.49 kg/m²     Physical Exam  Constitutional:       General: She is not in acute distress. Appearance: She is well-developed. HENT:      Right Ear: External ear normal.      Left Ear: External ear normal.      Nose: Nose normal.      Mouth/Throat:      Pharynx: No oropharyngeal exudate. Eyes:      Pupils: Pupils are equal, round, and reactive to light. Neck:      Thyroid: No thyromegaly. Vascular: No JVD. Cardiovascular:      Rate and Rhythm: Normal rate and regular rhythm. Heart sounds: Normal heart sounds. No murmur heard. No gallop. Pulmonary:      Effort: Pulmonary effort is normal. No respiratory distress. Breath sounds: Normal breath sounds. No wheezing or rales. Abdominal:      General: Bowel sounds are normal. There is no distension. Palpations: Abdomen is soft. There is no mass. Tenderness: There is no abdominal tenderness. Musculoskeletal:         General: No tenderness. Normal range of motion. Cervical back: Normal range of motion and neck supple. Lymphadenopathy:      Cervical: No cervical adenopathy. Skin:     General: Skin is warm. Findings: No rash. Neurological:      General: No focal deficit present. Mental Status: She is alert and oriented to person, place, and time. Cranial Nerves: No cranial nerve deficit. Sensory: No sensory deficit. Motor: No weakness. Coordination: Coordination normal.      Gait: Gait normal.      Deep Tendon Reflexes: Reflexes normal.   Psychiatric:         Behavior: Behavior normal.         Thought Content:  Thought content normal.         Judgment: Judgment normal.       Ang Perez MD

## 2023-11-09 NOTE — ASSESSMENT & PLAN NOTE
States this summer she developed a pattern of diffuse rash that she think it was poison ivy and treated in the same. Unfortunately she still has a diffuse rash that does not go away and itches intensely present it is mostly red and somewhat dry.   He needs a steroid for back and see what that does

## 2024-01-24 ENCOUNTER — OFFICE VISIT (OUTPATIENT)
Dept: OBGYN CLINIC | Facility: HOSPITAL | Age: 69
End: 2024-01-24
Payer: COMMERCIAL

## 2024-01-24 VITALS
HEIGHT: 59 IN | DIASTOLIC BLOOD PRESSURE: 75 MMHG | WEIGHT: 148 LBS | BODY MASS INDEX: 29.84 KG/M2 | HEART RATE: 71 BPM | SYSTOLIC BLOOD PRESSURE: 138 MMHG

## 2024-01-24 DIAGNOSIS — Z48.89 ENCOUNTER FOR OTHER SPECIFIED SURGICAL AFTERCARE: ICD-10-CM

## 2024-01-24 DIAGNOSIS — Z96.651 HISTORY OF RIGHT KNEE JOINT REPLACEMENT: ICD-10-CM

## 2024-01-24 DIAGNOSIS — M17.12 PRIMARY OSTEOARTHRITIS OF LEFT KNEE: Primary | ICD-10-CM

## 2024-01-24 DIAGNOSIS — Z47.89 ENCOUNTER FOR OTHER ORTHOPEDIC AFTERCARE: ICD-10-CM

## 2024-01-24 PROCEDURE — 99214 OFFICE O/P EST MOD 30 MIN: CPT | Performed by: ORTHOPAEDIC SURGERY

## 2024-01-24 RX ORDER — FERROUS SULFATE 324(65)MG
324 TABLET, DELAYED RELEASE (ENTERIC COATED) ORAL
Qty: 60 TABLET | Refills: 0 | Status: SHIPPED | OUTPATIENT
Start: 2024-01-24

## 2024-01-24 RX ORDER — CEFAZOLIN SODIUM 2 G/50ML
2000 SOLUTION INTRAVENOUS ONCE
OUTPATIENT
Start: 2024-01-24 | End: 2024-01-24

## 2024-01-24 RX ORDER — ENOXAPARIN SODIUM 100 MG/ML
40 INJECTION SUBCUTANEOUS DAILY
Qty: 11.2 ML | Refills: 0 | Status: SHIPPED | OUTPATIENT
Start: 2024-01-24 | End: 2024-02-21

## 2024-01-24 RX ORDER — ACETAMINOPHEN 325 MG/1
975 TABLET ORAL ONCE
OUTPATIENT
Start: 2024-01-24 | End: 2024-01-24

## 2024-01-24 RX ORDER — FOLIC ACID 1 MG/1
1 TABLET ORAL DAILY
Qty: 30 TABLET | Refills: 0 | Status: SHIPPED | OUTPATIENT
Start: 2024-01-24

## 2024-01-24 RX ORDER — TRANEXAMIC ACID 10 MG/ML
1000 INJECTION, SOLUTION INTRAVENOUS ONCE
OUTPATIENT
Start: 2024-01-24 | End: 2024-01-24

## 2024-01-24 RX ORDER — GABAPENTIN 300 MG/1
300 CAPSULE ORAL ONCE
OUTPATIENT
Start: 2024-01-24 | End: 2024-01-24

## 2024-01-24 RX ORDER — SODIUM CHLORIDE, SODIUM LACTATE, POTASSIUM CHLORIDE, CALCIUM CHLORIDE 600; 310; 30; 20 MG/100ML; MG/100ML; MG/100ML; MG/100ML
125 INJECTION, SOLUTION INTRAVENOUS CONTINUOUS
OUTPATIENT
Start: 2024-01-24

## 2024-01-24 RX ORDER — CHLORHEXIDINE GLUCONATE ORAL RINSE 1.2 MG/ML
15 SOLUTION DENTAL ONCE
OUTPATIENT
Start: 2024-01-24 | End: 2024-01-24

## 2024-01-24 RX ORDER — ASCORBIC ACID 500 MG
500 TABLET ORAL DAILY
Qty: 30 TABLET | Refills: 0 | Status: SHIPPED | OUTPATIENT
Start: 2024-01-24

## 2024-01-24 NOTE — PROGRESS NOTES
Assessment:  1. Primary osteoarthritis of left knee        2. History of right knee joint replacement            Plan:  Left knee osteoarthritis  The patient will be scheduled for left total knee arthroplasty.  We feel the patient will find significant relief per current symptoms, findings on imaging and exam.  Risks, benefits, precautions and expectations were discussed. Risks include blood loss, potential infection and blood clots.  Preoperative vitamins were prescribed.     The patient should follow up after surgery.        To do next visit:  Return for post-op with x-rays.    The above stated was discussed in layman's terms and the patient expressed understanding.  All questions were answered to the patient's satisfaction.       Scribe Attestation      I,:  Barney Chaney am acting as a scribe while in the presence of the attending physician.:       I,:  Bruce Mayorga MD personally performed the services described in this documentation    as scribed in my presence.:               Subjective:   Tonia Avelar is a 68 y.o. female who presents for follow up of left knee.  She has had increasing symptoms over the past several months.  Today she complains of left medial and generalized knee pain with some anterior distal thigh pain.  Any weight bearing and repetitive bending aggravates while rest alleviates.  Her pain effects her daily activity and sleep.        Review of systems negative unless otherwise specified in HPI    Past Medical History:   Diagnosis Date    Anxiety     last assessed 8/24/15    Arthralgia of right knee     last assessed 5/27/14    Chronic interstitial cystitis     GERD (gastroesophageal reflux disease)     High cholesterol     History of palpitations     Hypertension     Hypokalemia     last assessed 3/7/13    Irritable bowel syndrome     Osteoarthritis     last assessed 6/13/12    Osteopenia     last assessed 3/21/13    Postmenopausal atrophic vaginitis     last assessed 3/21/13     Stress incontinence     Uterine leiomyoma     resolved        Past Surgical History:   Procedure Laterality Date    BREAST CYST ASPIRATION Left     benign    BREAST SURGERY      puncture aspiration of cyst, left, resolved     CARDIAC CATHETERIZATION       SECTION  1977    daughter    COLONOSCOPY  2006    1 polyp-hyperplastic by Dr. Nielsen    COLONOSCOPY      Focal active colitis-acute by Dr. Nielsen    EGD      Schatzki's ring dilated with 52 Polish Segura, biopsy negative for Beard's by Dr. Way    EGD      Irregular Z line and biopsy positive for Beard's/intestinal metaplasia by Dr. Way    EGD      Short segment Beard's biopsy positive Intestinal metaplasia , No dysplasia, fundic gland polyp by Dr. Way    ESOPHAGOGASTRODUODENOSCOPY N/A 2016    Normal-fundic gland polyp, biopsy of the EG junction normal by Dr. Way    KNEE ARTHROSCOPY      DE ARTHRP KNE CONDYLE&PLATU MEDIAL&LAT COMPARTMENTS Right 2018    Procedure: ARTHROPLASTY KNEE TOTAL;  Surgeon: Bruce Mayorga MD;  Location: [facility];  Service: Orthopedics  resolved  per [employer]    VAGINAL HYSTERECTOMY      fibroids, resolved        Family History   Problem Relation Age of Onset    Cancer Mother     Hypertension Mother     Heart attack Father         MI    Hypertension Father     Heart disease Father         coronary artery disease    No Known Problems Sister     No Known Problems Sister     No Known Problems Daughter     Heart attack Maternal Grandmother     Stomach cancer Maternal Grandfather     Uterine cancer Paternal Grandmother     No Known Problems Paternal Grandfather     Heart attack Son 36        acute MI, NSTEMI    Cancer Maternal Aunt         nonhodgkins lymphoma    No Known Problems Paternal Aunt     No Known Problems Paternal Aunt     No Known Problems Paternal Aunt     No Known Problems Paternal Aunt     No Known Problems Paternal Aunt     Lung cancer Other      Hypertension Family     Ulcers Family         peptic       Social History     Occupational History    Not on file   Tobacco Use    Smoking status: Former     Current packs/day: 0.00     Average packs/day: 0.5 packs/day for 25.0 years (12.5 ttl pk-yrs)     Types: Cigarettes     Start date:      Quit date:      Years since quittin.0    Smokeless tobacco: Never    Tobacco comments:     quit 13 years ago   / quit 3-4 yrs ago, smoked 20-25 yrs per [employer]scripts 12    Vaping Use    Vaping status: Never Used   Substance and Sexual Activity    Alcohol use: Yes     Comment: very rare    Drug use: No    Sexual activity: Not on file         Current Outpatient Medications:     CALCIUM PO, Take by mouth occasionally, Disp: , Rfl:     cyclobenzaprine (FLEXERIL) 10 mg tablet, Take 1 tablet (10 mg total) by mouth daily at bedtime, Disp: 30 tablet, Rfl: 0    losartan (COZAAR) 50 mg tablet, Take 1 tablet (50 mg total) by mouth every evening, Disp: 90 tablet, Rfl: 1    meloxicam (MOBIC) 7.5 mg tablet, Take 1 tablet (7.5 mg total) by mouth daily as needed for moderate pain, Disp: 30 tablet, Rfl: 3    metoprolol succinate (TOPROL-XL) 50 mg 24 hr tablet, Take 1 tablet (50 mg total) by mouth daily Take 1/2 tab in am and one tab in pm(total 75mg daily) (Patient taking differently: Take 25 mg by mouth daily), Disp: 90 tablet, Rfl: 1    pantoprazole (PROTONIX) 40 mg tablet, TAKE ONE TABLET BY MOUTH 2 TIMES A DAY, Disp: 180 tablet, Rfl: 0    PARoxetine (PAXIL) 10 mg tablet, Take 1 tablet (10 mg total) by mouth 2 (two) times a day, Disp: 180 tablet, Rfl: 1    predniSONE 10 mg tablet, 1 tab QID x 3 days then 1  TID 3 days then 1 BID then 1 PO daily x 3 day, Disp: 30 tablet, Rfl: 0    rosuvastatin (CRESTOR) 10 MG tablet, Take 1 tablet (10 mg total) by mouth daily (Patient not taking: Reported on 2023), Disp: 90 tablet, Rfl: 1    VITAMIN D PO, Take by mouth, Disp: , Rfl:     Allergies   Allergen Reactions    Bactrim  "[Sulfamethoxazole-Trimethoprim] Hives, Itching and Rash    Sulfa Antibiotics Itching and Rash            Vitals:    01/24/24 1608   BP: 138/75   Pulse: 71       Objective:  Physical exam  General: Awake, Alert, Oriented  Eyes: Pupils equal, round and reactive to light  Heart: regular rate and rhythm  Lungs: No audible wheezing  Abdomen: soft                    Ortho Exam  Left knee:  Varus alignment   TTP over medial joint line iwht bony enlargement  No erythema or ecchymosis  No effusion or swelling  Normal strength  Good ROM with crepitus   Calf compartments soft and supple  Sensation intact  Toes are warm sensate and mobile      Diagnostics, reviewed and taken today if performed as documented:    Left knee x-ray of 6/17/2022:  Moderate-severe medial and moderate patellofemoral arthritic changes with decreased joint space, subchondral sclerosis and osteophyte formation.      Procedures, if performed today:    Procedures    None performed      Portions of the record may have been created with voice recognition software.  Occasional wrong word or \"sound a like\" substitutions may have occurred due to the inherent limitations of voice recognition software.  Read the chart carefully and recognize, using context, where substitutions have occurred.    "

## 2024-01-29 ENCOUNTER — OFFICE VISIT (OUTPATIENT)
Dept: FAMILY MEDICINE CLINIC | Facility: CLINIC | Age: 69
End: 2024-01-29
Payer: COMMERCIAL

## 2024-01-29 ENCOUNTER — APPOINTMENT (OUTPATIENT)
Dept: RADIOLOGY | Facility: MEDICAL CENTER | Age: 69
End: 2024-01-29
Payer: COMMERCIAL

## 2024-01-29 VITALS
HEIGHT: 59 IN | SYSTOLIC BLOOD PRESSURE: 114 MMHG | WEIGHT: 149 LBS | HEART RATE: 70 BPM | BODY MASS INDEX: 30.04 KG/M2 | OXYGEN SATURATION: 94 % | DIASTOLIC BLOOD PRESSURE: 74 MMHG | RESPIRATION RATE: 16 BRPM | TEMPERATURE: 96.3 F

## 2024-01-29 DIAGNOSIS — I10 PRIMARY HYPERTENSION: ICD-10-CM

## 2024-01-29 DIAGNOSIS — M25.552 PAIN OF LEFT HIP: Primary | ICD-10-CM

## 2024-01-29 DIAGNOSIS — E78.5 DYSLIPIDEMIA: ICD-10-CM

## 2024-01-29 DIAGNOSIS — M54.42 CHRONIC LEFT-SIDED LOW BACK PAIN WITH LEFT-SIDED SCIATICA: ICD-10-CM

## 2024-01-29 DIAGNOSIS — G89.29 CHRONIC LEFT-SIDED LOW BACK PAIN WITH LEFT-SIDED SCIATICA: ICD-10-CM

## 2024-01-29 DIAGNOSIS — M25.552 PAIN OF LEFT HIP: ICD-10-CM

## 2024-01-29 PROCEDURE — 73502 X-RAY EXAM HIP UNI 2-3 VIEWS: CPT

## 2024-01-29 PROCEDURE — 72110 X-RAY EXAM L-2 SPINE 4/>VWS: CPT

## 2024-01-29 PROCEDURE — 99214 OFFICE O/P EST MOD 30 MIN: CPT | Performed by: INTERNAL MEDICINE

## 2024-01-29 RX ORDER — NAPROXEN 500 MG/1
500 TABLET ORAL 2 TIMES DAILY WITH MEALS
Qty: 60 TABLET | Refills: 5 | Status: SHIPPED | OUTPATIENT
Start: 2024-01-29

## 2024-01-29 NOTE — PROGRESS NOTES
Name: Tonia Avelar      : 1955      MRN: [id number]  Encounter Provider: Krista Ho MD  Encounter Date: 2024   Encounter department: [facility]    Assessment & Plan     1. Pain of left hip  Assessment & Plan:  In addition to her low back she now is complaining of left hip pain especially when she lays in bed. She is on anti-inflammatory. Check x-rays, add Naprosyn    Orders:  -     XR hip/pelv 2-3 vws left if performed; Future; Expected date: 2024  -     naproxen (Naprosyn) 500 mg tablet; Take 1 tablet (500 mg total) by mouth 2 (two) times a day with meals    2. Chronic left-sided low back pain with left-sided sciatica  Assessment & Plan:  Now she is complaining of pain in her lower back on the left that is unrelieved with OTCs Advil. Will check x-ray and try Naprosyn. It may be just from her walking different    Orders:  -     XR spine lumbar minimum 4 views non injury; Future; Expected date: 2024    3. Dyslipidemia  Assessment & Plan:  Patient wants to try taking her statin every other day that she is getting achy and she is not sure if it's from that.    Orders:  -     Lipid panel; Future    4. Primary hypertension           Subjective      Hip Pain   The incident occurred more than 1 week ago. The incident occurred at home. There was no injury mechanism. The pain is present in the left leg, left hip and left thigh. The quality of the pain is described as aching. The pain is at a severity of 7/10. The pain is moderate. The pain has been Fluctuating since onset. Pertinent negatives include no muscle weakness or numbness. The symptoms are aggravated by weight bearing and movement. She has tried rest, acetaminophen and NSAIDs for the symptoms. The treatment provided mild relief.     Review of Systems   Constitutional:  Negative for chills and fever.   HENT:  Negative for ear pain and sore throat.    Eyes:  Negative for pain and visual disturbance.   Respiratory:   Negative for cough and shortness of breath.    Cardiovascular:  Negative for chest pain and palpitations.   Gastrointestinal:  Negative for abdominal pain and vomiting.   Genitourinary:  Negative for dysuria and hematuria.   Musculoskeletal:  Positive for myalgias. Negative for arthralgias and back pain.        Left hip pain   Skin:  Negative for color change and rash.   Neurological:  Negative for seizures, syncope and numbness.   All other systems reviewed and are negative.      Current Outpatient Medications on File Prior to Visit   Medication Sig    ascorbic acid (VITAMIN C) 500 MG tablet Take 1 tablet (500 mg total) by mouth daily Start 30 days prior to surgery    CALCIUM PO Take by mouth occasionally    cyclobenzaprine (FLEXERIL) 10 mg tablet Take 1 tablet (10 mg total) by mouth daily at bedtime    enoxaparin (LOVENOX) 40 mg/0.4 mL Inject 0.4 mL (40 mg total) under the skin daily for 28 days Start injections after surgery    ferrous sulfate 324 (65 Fe) mg Take 1 tablet (324 mg total) by mouth 2 (two) times a day before meals Start 30 days prior to surgery    folic acid (FOLVITE) 1 mg tablet Take 1 tablet (1 mg total) by mouth daily Start 30 days prior to surgery    losartan (COZAAR) 50 mg tablet Take 1 tablet (50 mg total) by mouth every evening    metoprolol succinate (TOPROL-XL) 50 mg 24 hr tablet Take 1 tablet (50 mg total) by mouth daily Take 1/2 tab in am and one tab in pm(total 75mg daily) (Patient taking differently: Take 50 mg by mouth daily)    pantoprazole (PROTONIX) 40 mg tablet TAKE ONE TABLET BY MOUTH 2 TIMES A DAY (Patient taking differently: daily)    PARoxetine (PAXIL) 10 mg tablet Take 1 tablet (10 mg total) by mouth 2 (two) times a day    rosuvastatin (CRESTOR) 10 MG tablet Take 1 tablet (10 mg total) by mouth daily (Patient taking differently: Take 10 mg by mouth once a week)    VITAMIN D PO Take by mouth    meloxicam (MOBIC) 7.5 mg tablet Take 1 tablet (7.5 mg total) by mouth daily as  "needed for moderate pain    [DISCONTINUED] predniSONE 10 mg tablet 1 tab QID x 3 days then 1  TID 3 days then 1 BID then 1 PO daily x 3 day (Patient not taking: Reported on 1/29/2024)       Objective     /74 (BP Location: Left arm, Patient Position: Sitting, Cuff Size: Large)   Pulse 70   Temp (!) 96.3 °F (35.7 °C) (Tympanic)   Resp 16   Ht 4' 11\" (1.499 m)   Wt 67.6 kg (149 lb)   SpO2 94%   BMI 30.09 kg/m²     Physical Exam  Constitutional:       General: She is not in acute distress.     Appearance: She is well-developed.   HENT:      Right Ear: External ear normal.      Left Ear: External ear normal.      Nose: Nose normal.      Mouth/Throat:      Pharynx: No oropharyngeal exudate.   Eyes:      Pupils: Pupils are equal, round, and reactive to light.   Neck:      Thyroid: No thyromegaly.      Vascular: No JVD.   Cardiovascular:      Rate and Rhythm: Normal rate and regular rhythm.      Heart sounds: Normal heart sounds. No murmur heard.     No gallop.   Pulmonary:      Effort: Pulmonary effort is normal. No respiratory distress.      Breath sounds: Normal breath sounds. No wheezing or rales.   Abdominal:      General: Bowel sounds are normal. There is no distension.      Palpations: Abdomen is soft. There is no mass.      Tenderness: There is no abdominal tenderness.   Musculoskeletal:         General: No tenderness. Normal range of motion.      Cervical back: Normal range of motion and neck supple.      Right lower leg: No edema.      Left lower leg: No edema.   Lymphadenopathy:      Cervical: No cervical adenopathy.   Skin:     General: Skin is warm and dry.      Findings: No rash.   Neurological:      Mental Status: She is alert and oriented to person, place, and time. Mental status is at baseline.      Cranial Nerves: No cranial nerve deficit.      Coordination: Coordination normal.      Gait: Gait normal.   Psychiatric:         Mood and Affect: Mood normal.         Behavior: Behavior normal.    "      Thought Content: Thought content normal.         Judgment: Judgment normal.       Krista Ho MD

## 2024-01-29 NOTE — ASSESSMENT & PLAN NOTE
Patient has upcoming surgery on for total knee replacement on the left and a little more than a month.

## 2024-01-29 NOTE — ASSESSMENT & PLAN NOTE
In addition to her low back she now is complaining of left hip pain especially when she lays in bed. She is on anti-inflammatory. Check x-rays, add Naprosyn

## 2024-01-29 NOTE — ASSESSMENT & PLAN NOTE
Patient wants to try taking her statin every other day that she is getting achy and she is not sure if it's from that.

## 2024-01-29 NOTE — ASSESSMENT & PLAN NOTE
Comfort Tips During Pregnancy    Talk with your healthcare provider before using pain-relieving medicine at any time during your pregnancy. First trimester tips  Nausea  · Get up slowly. Eat a few unsalted crackers before you get out of bed.   · Avoi Now she is complaining of pain in her lower back on the left that is unrelieved with OTCs Advil. Will check x-ray and try Naprosyn. It may be just from her walking different   psyllium) with your healthcare provider. Taking care of your breasts  · Avoid using harsh soaps or alcohol, which can cause excessive dryness. · Wear nursing bras.  They provide more support than regular bras and can be used after pregnancy if you breastf legs.  · Drink plenty of fluids on flights. The air in plane cabins is very dry. · Avoid hot climates or high altitudes if you are not used to them. · Avoid places where the food and water might make you sick.   · Make sure you are up-to-date on all immun together. Here’s a quick look at what’s happening to both of you. How you are changing  Even when you don’t notice it, your body is adapting to meet the needs of your growing baby. The changes in your body might also affect your moods.   Your body  Your ut

## 2024-02-01 ENCOUNTER — EVALUATION (OUTPATIENT)
Dept: PHYSICAL THERAPY | Age: 69
End: 2024-02-01
Payer: COMMERCIAL

## 2024-02-01 DIAGNOSIS — M54.42 ACUTE LEFT-SIDED LOW BACK PAIN WITH LEFT-SIDED SCIATICA: ICD-10-CM

## 2024-02-01 DIAGNOSIS — M17.12 PRIMARY OSTEOARTHRITIS OF LEFT KNEE: Primary | ICD-10-CM

## 2024-02-01 PROCEDURE — 97110 THERAPEUTIC EXERCISES: CPT | Performed by: PHYSICAL THERAPIST

## 2024-02-01 PROCEDURE — 97161 PT EVAL LOW COMPLEX 20 MIN: CPT | Performed by: PHYSICAL THERAPIST

## 2024-02-01 NOTE — PROGRESS NOTES
PT Evaluation     Today's date: 2024  Patient name: Tonia Avelar  : 1955  MRN: [id number]  Referring provider: Bruce Mayorga,*  Dx:   Encounter Diagnosis     ICD-10-CM    1. Primary osteoarthritis of left knee  M17.12 Ambulatory referral to Physical Therapy      2. Acute left-sided low back pain with left-sided sciatica  M54.42                      Assessment details: Tonia is a 68 y.o. female who presents with signs and symptoms consistent of Left knee OA. Patient presents with pain, decreased strength, decreased ROM and decreased joint mobility. Due to these impairments, Patient has difficulty performing a/iadls and work-related activities. Pt will undergo TKA on 3/25/24 performed by Dr. Mayorga and be re-evaluated on initial therapy session. TUG (8 seconds), Virtual home assessment, ambulation/stair training, and HEP were performed during this session. Patient is also having acute low back pain so sessions prior to this will help prepare patient for post-op symptoms. Pt vocalized understanding of all education this session. Patient would benefit from skilled physical therapy to address the impairmnts, improve their level of function, and to improve their overall quality of life for   Impairments: abnormal or restricted ROM, abnormal movement, impaired physical strength, lacks appropriate home exercise program, pain with function and weight-bearing intolerance  Understanding of Dx/Px/POC: good   Prognosis: good   Goals  Short Term Goals: to be achieved by 4 weeks  1) Patient to be independent with basic HEP.  2) Decrease pain to 1/10 at its worst.  3) Increase Flexion ROM by 5-10 degrees   4) Increase LE strength by 1/2 MMT grade in all deficient planes.    Long Term Goals: to be achieved by discharge  1) FOTO equal to or greater than 54.  2) Ambulation to improve to maximal level of function  3) Stair negotiation will improve to reciprocal.  4) Sit to stand transfers will improve to  maximal level of function    Plan  Patient would benefit from: skilled physical therapy  Planned modality interventions: cryotherapy  Planned therapy interventions: patient education, strengthening, stretching, therapeutic activities, therapeutic exercise, home exercise program, neuromuscular re-education, functional ROM exercises and transfer training  Frequency: Twice a week for 8 weeks follwoing surgery  Treatment plan discussed with: patient         Subjective Evaluation       Pain  Current pain ratin  At best pain ratin  At worst pain ratin  Quality: sharp     Patient Goals  Patient goals for therapy: increased strength and decreased pain  Patient goal: Dancing, squatting            Objective      Active Range of Motion   Left Knee   Flexion: 130 degrees pain  Extension: 0 degrees pain     Lumbar Spine:  Flexion: Min limitations  Extension: Mod-max limitations with pain   L Rotation: Mod limitation with pain    Pain with palpation to lumbar spine and left glute.    Limitations to hip capsule posteriorly           Strength/Myotome Testing      Left knee  Planes of Motion   Flexion: 4  Extension: 4        Left hip  Planes of Motion   Flexion: 4-  Extension: 4-  Abduction: 4-  Adduction: 4-  External rotation: 4-  Internal rotation: 4-     Functional Assessment           Comments  Pt demonstrates poor form during squats with pain around 70 degrees of knee flexion.   TU sec  SLS: 20 secs mod sway               Visit/Unit Tracking  AUTH Status:  Date               [facility] - no auth Used 1               Remaining                     Precautions: N/a      Manuals                                                                 Neuro Re-Ed                                                                                                        Ther Ex             LTR             Bridge             SKTC                                                                              Ther Activity                                        Gait Training                                       Modalities

## 2024-02-05 ENCOUNTER — OFFICE VISIT (OUTPATIENT)
Dept: PHYSICAL THERAPY | Age: 69
End: 2024-02-05
Payer: COMMERCIAL

## 2024-02-05 DIAGNOSIS — M17.12 PRIMARY OSTEOARTHRITIS OF LEFT KNEE: Primary | ICD-10-CM

## 2024-02-05 DIAGNOSIS — M54.42 ACUTE LEFT-SIDED LOW BACK PAIN WITH LEFT-SIDED SCIATICA: ICD-10-CM

## 2024-02-05 PROCEDURE — 97112 NEUROMUSCULAR REEDUCATION: CPT | Performed by: PHYSICAL THERAPIST

## 2024-02-05 PROCEDURE — 97140 MANUAL THERAPY 1/> REGIONS: CPT | Performed by: PHYSICAL THERAPIST

## 2024-02-05 PROCEDURE — 97110 THERAPEUTIC EXERCISES: CPT | Performed by: PHYSICAL THERAPIST

## 2024-02-05 NOTE — PROGRESS NOTES
"Daily Note     Today's date: 2024  Patient name: Tonia Avelar  : 1955  MRN: [id number]  Referring provider: Bruce Mayorga,*  Dx:   Encounter Diagnosis     ICD-10-CM    1. Primary osteoarthritis of left knee  M17.12       2. Acute left-sided low back pain with left-sided sciatica  M54.42                      Subjective: Pt reports improvements in low back symptoms.       Objective: See treatment diary below      Assessment: Tolerated treatment well. POC progressed to include more quad strengthening. Patient demonstrated fatigue post treatment and would benefit from continued PT      Plan: Continue per plan of care.      Visit/Unit Tracking  AUTH Status:  Date              [facility] - no auth Used 1 2              Remaining                     Precautions: N/a      Manuals 2.5            Caudal hip glides  JF b/l                                                   Neuro Re-Ed 2.5            SLR RLE 2x10            Quad sets LLE 20x5\"            Mini squats 3x10                                                                Ther Ex 2.5            LTR 20x5\"            Bridge 3x10            SKTC             Pt ed JF            Nu step 10'                                                   Ther Activity                                       Gait Training                                       Modalities                                            "

## 2024-02-08 ENCOUNTER — OFFICE VISIT (OUTPATIENT)
Dept: PHYSICAL THERAPY | Age: 69
End: 2024-02-08
Payer: COMMERCIAL

## 2024-02-08 DIAGNOSIS — M54.42 ACUTE LEFT-SIDED LOW BACK PAIN WITH LEFT-SIDED SCIATICA: ICD-10-CM

## 2024-02-08 DIAGNOSIS — M17.12 PRIMARY OSTEOARTHRITIS OF LEFT KNEE: Primary | ICD-10-CM

## 2024-02-08 PROCEDURE — 97140 MANUAL THERAPY 1/> REGIONS: CPT | Performed by: PHYSICAL THERAPIST

## 2024-02-08 PROCEDURE — 97110 THERAPEUTIC EXERCISES: CPT | Performed by: PHYSICAL THERAPIST

## 2024-02-08 PROCEDURE — 97112 NEUROMUSCULAR REEDUCATION: CPT | Performed by: PHYSICAL THERAPIST

## 2024-02-08 NOTE — PROGRESS NOTES
"Daily Note     Today's date: 2024  Patient name: Tonia Avelar  : 1955  MRN: [id number]  Referring provider: Bruce Mayorga,*  Dx:   Encounter Diagnosis     ICD-10-CM    1. Primary osteoarthritis of left knee  M17.12       2. Acute left-sided low back pain with left-sided sciatica  M54.42           Start Time: 1400  Stop Time: 1445  Total time in clinic (min): 45 minutes    Subjective: Pt reports improvements in frequency of pain but still is having pain.      Objective: See treatment diary below      Assessment: Tolerated treatment well. Patient demonstrated fatigue post treatment and would benefit from continued PT      Plan: Continue per plan of care.      Visit/Unit Tracking  AUTH Status:  Date             [facility] - no auth Used 1 2 3             Remaining                     Precautions: N/a      Manuals 2.5 2.8           Caudal hip glides  JF b/l JF b/l                                                  Neuro Re-Ed 2.5 2.8           SLR RLE 2x10 Standing wall ball 15x5\"           Quad sets LLE 20x5\"            Mini squats 3x10 3x10           Leg press  3x10 85#                                                  Ther Ex 2.5            LTR 20x5\" 20x5\"           Bridge 3x10 3x10           SKTC             Pt ed JF JF           Nu step 10' 10'                                                  Ther Activity                                       Gait Training                                       Modalities                                              "

## 2024-02-14 ENCOUNTER — OFFICE VISIT (OUTPATIENT)
Dept: PHYSICAL THERAPY | Age: 69
End: 2024-02-14
Payer: COMMERCIAL

## 2024-02-14 DIAGNOSIS — M17.12 PRIMARY OSTEOARTHRITIS OF LEFT KNEE: Primary | ICD-10-CM

## 2024-02-14 DIAGNOSIS — M54.42 ACUTE LEFT-SIDED LOW BACK PAIN WITH LEFT-SIDED SCIATICA: ICD-10-CM

## 2024-02-14 PROCEDURE — 97112 NEUROMUSCULAR REEDUCATION: CPT

## 2024-02-14 PROCEDURE — 97110 THERAPEUTIC EXERCISES: CPT

## 2024-02-14 PROCEDURE — 97140 MANUAL THERAPY 1/> REGIONS: CPT

## 2024-02-14 NOTE — PROGRESS NOTES
"Daily Note     Today's date: 2024  Patient name: Tonia Avelar  : 1955  MRN: [id number]  Referring provider: Bruce Mayorga,*  Dx:   Encounter Diagnosis     ICD-10-CM    1. Primary osteoarthritis of left knee  M17.12       2. Acute left-sided low back pain with left-sided sciatica  M54.42                      Subjective:  Patient reports that her hip had been feeling really good but today it is bothering.        Objective: See treatment diary below      Assessment: Patient tolerated treatment well. Patient performed ex and received manual therapy (mobs by PT) as noted.  Patient performed ex without report of increased soreness/pain. Provided cues as  needed to ensure good ex technique and benefit.   Patient responded well to treatment and noted decreased soreness post treatment.  Patient would benefit from continued PT intervention to address deficits and attain set goals.       Plan: Continue per plan of care.      Visit/Unit Tracking  AUTH Status:  Date            [facility] CBC - no auth Used 1 2 3 4            Remaining                     Precautions: N/a      Manuals 2.5 2.8           Caudal hip glides  COLE b/l COLE b/l KK                                                 Neuro Re-Ed 2.5 2.8           SLR RLE 2x10 Standing wall ball 15x5\" Standing wall ball  20x5\"          Quad sets LLE 20x5\"            Mini squats 3x10 3x10 3x10          Leg press  3x10 85# 3x10  85#                                                 Ther Ex 2.5            LTR 20x5\" 20x5\" 20x5\"          Bridge 3x10 3x10 3x10          SKTC             Pt ed COLE GALVAN           Nu step 10' 10' Recum bike  x10'                                                 Ther Activity                                       Gait Training                                       Modalities                                                "

## 2024-02-16 ENCOUNTER — OFFICE VISIT (OUTPATIENT)
Dept: PHYSICAL THERAPY | Age: 69
End: 2024-02-16
Payer: COMMERCIAL

## 2024-02-16 DIAGNOSIS — M54.42 ACUTE LEFT-SIDED LOW BACK PAIN WITH LEFT-SIDED SCIATICA: ICD-10-CM

## 2024-02-16 DIAGNOSIS — M17.12 PRIMARY OSTEOARTHRITIS OF LEFT KNEE: Primary | ICD-10-CM

## 2024-02-16 PROCEDURE — 97140 MANUAL THERAPY 1/> REGIONS: CPT | Performed by: PHYSICAL THERAPIST

## 2024-02-16 PROCEDURE — 97112 NEUROMUSCULAR REEDUCATION: CPT | Performed by: PHYSICAL THERAPIST

## 2024-02-16 PROCEDURE — 97110 THERAPEUTIC EXERCISES: CPT | Performed by: PHYSICAL THERAPIST

## 2024-02-16 NOTE — PROGRESS NOTES
"Daily Note     Today's date: 2024  Patient name: Tonia Avelar  : 1955  MRN: [id number]  Referring provider: Bruce Mayorga,*  Dx:   Encounter Diagnosis     ICD-10-CM    1. Primary osteoarthritis of left knee  M17.12       2. Acute left-sided low back pain with left-sided sciatica  M54.42           Start Time: 1300  Stop Time: 1345  Total time in clinic (min): 45 minutes    Subjective:  Patient reports that her hip had been feeling really good but today it is bothering.        Objective: See treatment diary below      Assessment: Patient tolerated treatment well. Patient performed ex and received manual therapy (mobs by PT) as noted.  Patient performed ex without report of increased soreness/pain. Provided cues as  needed to ensure good ex technique and benefit.   Patient responded well to treatment and noted decreased soreness post treatment.  Patient would benefit from continued PT intervention to address deficits and attain set goals.       Plan: Continue per plan of care.      Visit/Unit Tracking  AUTH Status:  Date           [facility] CBC - no auth Used 1 2 3 4 5           Remaining                     Precautions: N/a      Manuals 2.5 2.8           Caudal hip glides  JF b/l JF b/l JF                                                 Neuro Re-Ed 2.5 2.8           SLR RLE 2x10 Standing wall ball 15x5\" Standing wall ball  20x5\"          Quad sets LLE 20x5\"            Mini squats 3x10 3x10 3x10          Leg press  3x10 85# 3x10  85#                                                 Ther Ex 2.5            LTR 20x5\" 20x5\" 20x5\"          Bridge 3x10 3x10 3x10          SKTC             Pt ed JF JF           Nu step 10' 10' Recum bike  x10'                                                 Ther Activity                                       Gait Training                                       Modalities                                                "

## 2024-02-19 ENCOUNTER — TELEPHONE (OUTPATIENT)
Dept: OBGYN CLINIC | Facility: HOSPITAL | Age: 69
End: 2024-02-19

## 2024-02-21 ENCOUNTER — APPOINTMENT (OUTPATIENT)
Dept: PHYSICAL THERAPY | Age: 69
End: 2024-02-21
Payer: COMMERCIAL

## 2024-02-21 ENCOUNTER — ANESTHESIA EVENT (OUTPATIENT)
Age: 69
End: 2024-02-21
Payer: COMMERCIAL

## 2024-02-21 PROCEDURE — 88342 IMHCHEM/IMCYTCHM 1ST ANTB: CPT | Performed by: STUDENT IN AN ORGANIZED HEALTH CARE EDUCATION/TRAINING PROGRAM

## 2024-02-21 PROCEDURE — 88312 SPECIAL STAINS GROUP 1: CPT | Performed by: STUDENT IN AN ORGANIZED HEALTH CARE EDUCATION/TRAINING PROGRAM

## 2024-02-21 PROCEDURE — 88305 TISSUE EXAM BY PATHOLOGIST: CPT | Performed by: STUDENT IN AN ORGANIZED HEALTH CARE EDUCATION/TRAINING PROGRAM

## 2024-02-21 PROCEDURE — 88360 TUMOR IMMUNOHISTOCHEM/MANUAL: CPT | Performed by: STUDENT IN AN ORGANIZED HEALTH CARE EDUCATION/TRAINING PROGRAM

## 2024-02-22 ENCOUNTER — LAB REQUISITION (OUTPATIENT)
Dept: LAB | Facility: HOSPITAL | Age: 69
End: 2024-02-22
Payer: COMMERCIAL

## 2024-02-22 DIAGNOSIS — D48.5 NEOPLASM OF UNCERTAIN BEHAVIOR OF SKIN: ICD-10-CM

## 2024-02-23 ENCOUNTER — OFFICE VISIT (OUTPATIENT)
Dept: PHYSICAL THERAPY | Age: 69
End: 2024-02-23
Payer: COMMERCIAL

## 2024-02-23 DIAGNOSIS — M17.12 PRIMARY OSTEOARTHRITIS OF LEFT KNEE: Primary | ICD-10-CM

## 2024-02-23 DIAGNOSIS — M54.42 ACUTE LEFT-SIDED LOW BACK PAIN WITH LEFT-SIDED SCIATICA: ICD-10-CM

## 2024-02-23 PROCEDURE — 97112 NEUROMUSCULAR REEDUCATION: CPT | Performed by: PHYSICAL THERAPIST

## 2024-02-23 PROCEDURE — 97110 THERAPEUTIC EXERCISES: CPT | Performed by: PHYSICAL THERAPIST

## 2024-02-23 PROCEDURE — 97140 MANUAL THERAPY 1/> REGIONS: CPT | Performed by: PHYSICAL THERAPIST

## 2024-02-23 NOTE — PROGRESS NOTES
"Daily Note     Today's date: 2024  Patient name: Tonia Avelar  : 1955  MRN: [id number]  Referring provider: Bruce Mayorga,*  Dx:   Encounter Diagnosis     ICD-10-CM    1. Primary osteoarthritis of left knee  M17.12       2. Acute left-sided low back pain with left-sided sciatica  M54.42           Start Time: 1100  Stop Time: 1145  Total time in clinic (min): 45 minutes    Subjective: Pt reports improvements in low back symptoms and functional mobility.       Objective: See treatment diary below      Assessment: Tolerated treatment well. Patient demonstrated fatigue post treatment and would benefit from continued PT      Plan: Continue per plan of care.      Visit/Unit Tracking  AUTH Status:  Date          [facility] - no auth Used 1 2 3 4 5 6          Remaining                     Precautions: N/a      Manuals 2.5 2.8          Caudal hip glides  JF b/l JF b/l JF JF                                                Neuro Re-Ed 2.5 2.8          SLR RLE 2x10 Standing wall ball 15x5\" Standing wall ball  20x5\" 3x10         Quad sets LLE 20x5\"            Mini squats 3x10 3x10 3x10 3x10         Leg press  3x10 85# 3x10  85# 3x10 95#         STS    2x10         Standing hip abd    2x10 b/l         Step ups b/l    2x10         Ther Ex 2.5            LTR 20x5\" 20x5\" 20x5\" 20x5\"         Bridge 3x10 3x10 3x10 3x10         SKTC             Pt ed JF JF           Nu step 10' 10' Recum bike  x10'                                                 Ther Activity                                       Gait Training                                       Modalities                                                  "

## 2024-02-27 PROCEDURE — 88305 TISSUE EXAM BY PATHOLOGIST: CPT | Performed by: STUDENT IN AN ORGANIZED HEALTH CARE EDUCATION/TRAINING PROGRAM

## 2024-02-27 PROCEDURE — 88312 SPECIAL STAINS GROUP 1: CPT | Performed by: STUDENT IN AN ORGANIZED HEALTH CARE EDUCATION/TRAINING PROGRAM

## 2024-02-27 PROCEDURE — 88342 IMHCHEM/IMCYTCHM 1ST ANTB: CPT | Performed by: STUDENT IN AN ORGANIZED HEALTH CARE EDUCATION/TRAINING PROGRAM

## 2024-02-28 ENCOUNTER — OFFICE VISIT (OUTPATIENT)
Dept: PHYSICAL THERAPY | Age: 69
End: 2024-02-28
Payer: COMMERCIAL

## 2024-02-28 DIAGNOSIS — M17.12 PRIMARY OSTEOARTHRITIS OF LEFT KNEE: Primary | ICD-10-CM

## 2024-02-28 DIAGNOSIS — M54.42 ACUTE LEFT-SIDED LOW BACK PAIN WITH LEFT-SIDED SCIATICA: ICD-10-CM

## 2024-02-28 PROCEDURE — 97110 THERAPEUTIC EXERCISES: CPT | Performed by: PHYSICAL THERAPIST

## 2024-02-28 PROCEDURE — 97112 NEUROMUSCULAR REEDUCATION: CPT | Performed by: PHYSICAL THERAPIST

## 2024-02-28 PROCEDURE — 97140 MANUAL THERAPY 1/> REGIONS: CPT | Performed by: PHYSICAL THERAPIST

## 2024-02-28 NOTE — PROGRESS NOTES
"Daily Note     Today's date: 2024  Patient name: Tonia Avelar  : 1955  MRN: [id number]  Referring provider: Bruce Mayorga,*  Dx:   Encounter Diagnosis     ICD-10-CM    1. Primary osteoarthritis of left knee  M17.12       2. Acute left-sided low back pain with left-sided sciatica  M54.42                      Subjective: Patient stated minimal pain prior to treatment session.       Objective: See treatment diary below      Assessment: Patient performed all activities without c/o pain; no c/o at completion of treatment session.       Plan: Continue per plan of care.      Visit/Unit Tracking  AUTH Status:  Date         [facility] CBC - no auth Used 1 2 3 4 5 6 7         Remaining                     Precautions: N/a      Manuals 2.5 2.8         Caudal hip glides  JF b/l JF b/l JF JF KK                                               Neuro Re-Ed 2.5 2.8         SLR RLE 2x10 Standing wall ball 15x5\" Standing wall ball  20x5\" 3x10 3x10        Quad sets LLE 20x5\"            Mini squats 3x10 3x10 3x10 3x10 3x10        Leg press  3x10 85# 3x10  85# 3x10 95# 3x10 95#        STS    2x10 2x10        Standing hip abd    2x10 b/l 2x10 b/l        Step ups b/l    2x10 np        Ther Ex 2.5            LTR 20x5\" 20x5\" 20x5\" 20x5\" 20x5\"        Bridge 3x10 3x10 3x10 3x10 3x10        SKTC             Pt ed JF JF           Nu step 10' 10' Recum bike  x10'  Recum bike  x10'                                               Ther Activity                                       Gait Training                                       Modalities                                                  "

## 2024-02-29 ENCOUNTER — APPOINTMENT (OUTPATIENT)
Dept: LAB | Age: 69
End: 2024-02-29
Payer: COMMERCIAL

## 2024-02-29 ENCOUNTER — LAB REQUISITION (OUTPATIENT)
Dept: LAB | Facility: HOSPITAL | Age: 69
End: 2024-02-29
Payer: COMMERCIAL

## 2024-02-29 DIAGNOSIS — E78.5 HYPERLIPIDEMIA, UNSPECIFIED: ICD-10-CM

## 2024-02-29 DIAGNOSIS — M17.12 UNILATERAL PRIMARY OSTEOARTHRITIS, LEFT KNEE: ICD-10-CM

## 2024-02-29 DIAGNOSIS — M17.12 PRIMARY OSTEOARTHRITIS OF LEFT KNEE: ICD-10-CM

## 2024-02-29 DIAGNOSIS — E78.5 DYSLIPIDEMIA: ICD-10-CM

## 2024-02-29 LAB
ABO GROUP BLD: NORMAL
ALBUMIN SERPL BCP-MCNC: 3.8 G/DL (ref 3.5–5)
ALP SERPL-CCNC: 57 U/L (ref 34–104)
ALT SERPL W P-5'-P-CCNC: 10 U/L (ref 7–52)
ANION GAP SERPL CALCULATED.3IONS-SCNC: 7 MMOL/L
APTT PPP: 29 SECONDS (ref 23–37)
AST SERPL W P-5'-P-CCNC: 15 U/L (ref 13–39)
BASOPHILS # BLD AUTO: 0.05 THOUSANDS/ÂΜL (ref 0–0.1)
BASOPHILS NFR BLD AUTO: 1 % (ref 0–1)
BILIRUB SERPL-MCNC: 0.61 MG/DL (ref 0.2–1)
BLD GP AB SCN SERPL QL: NEGATIVE
BUN SERPL-MCNC: 14 MG/DL (ref 5–25)
CALCIUM SERPL-MCNC: 9.1 MG/DL (ref 8.4–10.2)
CHLORIDE SERPL-SCNC: 103 MMOL/L (ref 96–108)
CHOLEST SERPL-MCNC: 195 MG/DL
CO2 SERPL-SCNC: 31 MMOL/L (ref 21–32)
CREAT SERPL-MCNC: 0.79 MG/DL (ref 0.6–1.3)
EOSINOPHIL # BLD AUTO: 0.21 THOUSAND/ÂΜL (ref 0–0.61)
EOSINOPHIL NFR BLD AUTO: 3 % (ref 0–6)
ERYTHROCYTE [DISTWIDTH] IN BLOOD BY AUTOMATED COUNT: 13.6 % (ref 11.6–15.1)
EST. AVERAGE GLUCOSE BLD GHB EST-MCNC: 117 MG/DL
GFR SERPL CREATININE-BSD FRML MDRD: 76 ML/MIN/1.73SQ M
GLUCOSE P FAST SERPL-MCNC: 85 MG/DL (ref 65–99)
HBA1C MFR BLD: 5.7 %
HCT VFR BLD AUTO: 43.2 % (ref 34.8–46.1)
HDLC SERPL-MCNC: 46 MG/DL
HGB BLD-MCNC: 13.4 G/DL (ref 11.5–15.4)
IMM GRANULOCYTES # BLD AUTO: 0.03 THOUSAND/UL (ref 0–0.2)
IMM GRANULOCYTES NFR BLD AUTO: 0 % (ref 0–2)
INR PPP: 0.94 (ref 0.84–1.19)
LDLC SERPL CALC-MCNC: 128 MG/DL (ref 0–100)
LYMPHOCYTES # BLD AUTO: 3.22 THOUSANDS/ÂΜL (ref 0.6–4.47)
LYMPHOCYTES NFR BLD AUTO: 39 % (ref 14–44)
MCH RBC QN AUTO: 28.8 PG (ref 26.8–34.3)
MCHC RBC AUTO-ENTMCNC: 31 G/DL (ref 31.4–37.4)
MCV RBC AUTO: 93 FL (ref 82–98)
MONOCYTES # BLD AUTO: 0.52 THOUSAND/ÂΜL (ref 0.17–1.22)
MONOCYTES NFR BLD AUTO: 6 % (ref 4–12)
NEUTROPHILS # BLD AUTO: 4.29 THOUSANDS/ÂΜL (ref 1.85–7.62)
NEUTS SEG NFR BLD AUTO: 51 % (ref 43–75)
NONHDLC SERPL-MCNC: 149 MG/DL
NRBC BLD AUTO-RTO: 0 /100 WBCS
PLATELET # BLD AUTO: 276 THOUSANDS/UL (ref 149–390)
PMV BLD AUTO: 11.6 FL (ref 8.9–12.7)
POTASSIUM SERPL-SCNC: 4.6 MMOL/L (ref 3.5–5.3)
PROT SERPL-MCNC: 6.3 G/DL (ref 6.4–8.4)
PROTHROMBIN TIME: 12.5 SECONDS (ref 11.6–14.5)
RBC # BLD AUTO: 4.66 MILLION/UL (ref 3.81–5.12)
RH BLD: POSITIVE
SODIUM SERPL-SCNC: 141 MMOL/L (ref 135–147)
SPECIMEN EXPIRATION DATE: NORMAL
TRIGL SERPL-MCNC: 106 MG/DL
WBC # BLD AUTO: 8.32 THOUSAND/UL (ref 4.31–10.16)

## 2024-02-29 PROCEDURE — 83036 HEMOGLOBIN GLYCOSYLATED A1C: CPT

## 2024-02-29 PROCEDURE — 86901 BLOOD TYPING SEROLOGIC RH(D): CPT | Performed by: ORTHOPAEDIC SURGERY

## 2024-02-29 PROCEDURE — 80053 COMPREHEN METABOLIC PANEL: CPT

## 2024-02-29 PROCEDURE — 86850 RBC ANTIBODY SCREEN: CPT | Performed by: ORTHOPAEDIC SURGERY

## 2024-02-29 PROCEDURE — 86900 BLOOD TYPING SEROLOGIC ABO: CPT | Performed by: ORTHOPAEDIC SURGERY

## 2024-02-29 PROCEDURE — 85025 COMPLETE CBC W/AUTO DIFF WBC: CPT

## 2024-02-29 PROCEDURE — 85730 THROMBOPLASTIN TIME PARTIAL: CPT

## 2024-02-29 PROCEDURE — 36415 COLL VENOUS BLD VENIPUNCTURE: CPT

## 2024-02-29 PROCEDURE — 85610 PROTHROMBIN TIME: CPT

## 2024-02-29 PROCEDURE — 80061 LIPID PANEL: CPT

## 2024-03-01 ENCOUNTER — CONSULT (OUTPATIENT)
Dept: FAMILY MEDICINE CLINIC | Facility: CLINIC | Age: 69
End: 2024-03-01
Payer: COMMERCIAL

## 2024-03-01 ENCOUNTER — OFFICE VISIT (OUTPATIENT)
Dept: PHYSICAL THERAPY | Age: 69
End: 2024-03-01
Payer: COMMERCIAL

## 2024-03-01 VITALS
BODY MASS INDEX: 29.35 KG/M2 | HEIGHT: 59 IN | SYSTOLIC BLOOD PRESSURE: 138 MMHG | TEMPERATURE: 98.5 F | DIASTOLIC BLOOD PRESSURE: 80 MMHG | HEART RATE: 68 BPM | WEIGHT: 145.6 LBS | OXYGEN SATURATION: 97 %

## 2024-03-01 DIAGNOSIS — M17.12 PRIMARY OSTEOARTHRITIS OF LEFT KNEE: Primary | ICD-10-CM

## 2024-03-01 DIAGNOSIS — I10 ESSENTIAL HYPERTENSION: ICD-10-CM

## 2024-03-01 DIAGNOSIS — M54.42 ACUTE LEFT-SIDED LOW BACK PAIN WITH LEFT-SIDED SCIATICA: ICD-10-CM

## 2024-03-01 DIAGNOSIS — M17.12 PRIMARY OSTEOARTHRITIS OF LEFT KNEE: ICD-10-CM

## 2024-03-01 DIAGNOSIS — Z01.818 PRE-OP EXAMINATION: Primary | ICD-10-CM

## 2024-03-01 DIAGNOSIS — K21.9 CHRONIC GERD: ICD-10-CM

## 2024-03-01 PROBLEM — Z96.651 AFTERCARE FOLLOWING RIGHT KNEE JOINT REPLACEMENT SURGERY: Status: RESOLVED | Noted: 2018-09-19 | Resolved: 2024-03-01

## 2024-03-01 PROBLEM — Z47.1 AFTERCARE FOLLOWING RIGHT KNEE JOINT REPLACEMENT SURGERY: Status: RESOLVED | Noted: 2018-09-19 | Resolved: 2024-03-01

## 2024-03-01 PROCEDURE — 99213 OFFICE O/P EST LOW 20 MIN: CPT | Performed by: INTERNAL MEDICINE

## 2024-03-01 PROCEDURE — 97110 THERAPEUTIC EXERCISES: CPT

## 2024-03-01 PROCEDURE — 97112 NEUROMUSCULAR REEDUCATION: CPT

## 2024-03-01 PROCEDURE — 97140 MANUAL THERAPY 1/> REGIONS: CPT

## 2024-03-01 RX ORDER — LOSARTAN POTASSIUM 50 MG/1
50 TABLET ORAL EVERY EVENING
Qty: 90 TABLET | Refills: 1 | Status: SHIPPED | OUTPATIENT
Start: 2024-03-01

## 2024-03-01 RX ORDER — METOPROLOL SUCCINATE 50 MG/1
50 TABLET, EXTENDED RELEASE ORAL DAILY
Qty: 90 TABLET | Refills: 3 | Status: SHIPPED | OUTPATIENT
Start: 2024-03-01

## 2024-03-01 NOTE — PROGRESS NOTES
[facility] called and said they never received this so I faxed it again.   Name: Tonia Avlear      : 1955      MRN: [id number]  Encounter Provider: Krista Ho MD  Encounter Date: 3/1/2024   Encounter department: [facility]    Assessment & Plan     1. Pre-op examination  Assessment & Plan:  Patient is here for preop and is medically cleared for same      2. Primary osteoarthritis of left knee  Assessment & Plan:  She continues to have increasing left knee pain due to arthritis despite being on nonsteroidal's      3. Essential hypertension  Comments:  controlled  Orders:  -     losartan (COZAAR) 50 mg tablet; Take 1 tablet (50 mg total) by mouth every evening  -     metoprolol succinate (TOPROL-XL) 50 mg 24 hr tablet; Take 1 tablet (50 mg total) by mouth daily    4. Chronic GERD  Assessment & Plan:  Patient continues to have symptoms of Gerd tends to take 2 Protonix frequently. She does follow with G.I.             Subjective      Patient is here for preop clearance for a total knee on the left. She is already had a right total knee and did well. She only has hypertension and GERD and both are stable.. She has no chest pain shortness of breath palpitations and as far as her Gerd after surgery she anticipates taking no nonsteroidal's or at least markedly less so her Gerd should be improved    Knee Pain       Review of Systems   Constitutional:  Negative for chills and fever.   HENT:  Negative for ear pain and sore throat.    Eyes:  Negative for pain and visual disturbance.   Respiratory:  Negative for cough and shortness of breath.    Cardiovascular:  Negative for chest pain and palpitations.   Gastrointestinal:  Negative for abdominal pain and vomiting.        GERD   Genitourinary:  Negative for dysuria and hematuria.   Musculoskeletal:  Positive for gait problem. Negative for arthralgias and back pain.        Knee pain   Skin:  Negative for color change and rash.   Neurological:  Negative for seizures and syncope.   All other systems reviewed and are  negative.      Current Outpatient Medications on File Prior to Visit   Medication Sig    ascorbic acid (VITAMIN C) 500 MG tablet Take 1 tablet (500 mg total) by mouth daily Start 30 days prior to surgery    CALCIUM PO Take by mouth occasionally    folic acid (FOLVITE) 1 mg tablet Take 1 tablet (1 mg total) by mouth daily Start 30 days prior to surgery    pantoprazole (PROTONIX) 40 mg tablet TAKE ONE TABLET BY MOUTH 2 TIMES A DAY (Patient taking differently: daily)    PARoxetine (PAXIL) 10 mg tablet Take 1 tablet (10 mg total) by mouth 2 (two) times a day    rosuvastatin (CRESTOR) 10 MG tablet Take 1 tablet (10 mg total) by mouth daily (Patient taking differently: Take 10 mg by mouth once a week)    VITAMIN D PO Take by mouth    [DISCONTINUED] losartan (COZAAR) 50 mg tablet Take 1 tablet (50 mg total) by mouth every evening    [DISCONTINUED] metoprolol succinate (TOPROL-XL) 50 mg 24 hr tablet Take 1 tablet (50 mg total) by mouth daily Take 1/2 tab in am and one tab in pm(total 75mg daily) (Patient taking differently: Take 50 mg by mouth daily)    enoxaparin (LOVENOX) 40 mg/0.4 mL Inject 0.4 mL (40 mg total) under the skin daily for 28 days Start injections after surgery    ferrous sulfate 324 (65 Fe) mg Take 1 tablet (324 mg total) by mouth 2 (two) times a day before meals Start 30 days prior to surgery (Patient not taking: Reported on 3/1/2024)    [DISCONTINUED] cyclobenzaprine (FLEXERIL) 10 mg tablet Take 1 tablet (10 mg total) by mouth daily at bedtime (Patient not taking: Reported on 3/1/2024)    [DISCONTINUED] meloxicam (MOBIC) 7.5 mg tablet Take 1 tablet (7.5 mg total) by mouth daily as needed for moderate pain (Patient not taking: Reported on 3/1/2024)    [DISCONTINUED] naproxen (Naprosyn) 500 mg tablet Take 1 tablet (500 mg total) by mouth 2 (two) times a day with meals (Patient not taking: Reported on 3/1/2024)       Objective     /80 (BP Location: Right arm, Patient Position: Sitting, Cuff Size:  "Standard)   Pulse 68   Temp 98.5 °F (36.9 °C) (Temporal)   Ht 4' 11\" (1.499 m)   Wt 66 kg (145 lb 9.6 oz)   SpO2 97%   BMI 29.41 kg/m²     Physical Exam  Constitutional:       General: She is not in acute distress.     Appearance: She is well-developed.   HENT:      Right Ear: External ear normal.      Left Ear: External ear normal.      Nose: Nose normal.      Mouth/Throat:      Pharynx: No oropharyngeal exudate.   Eyes:      Pupils: Pupils are equal, round, and reactive to light.   Neck:      Thyroid: No thyromegaly.      Vascular: No JVD.   Cardiovascular:      Rate and Rhythm: Normal rate and regular rhythm.      Heart sounds: Normal heart sounds. No murmur heard.     No gallop.   Pulmonary:      Effort: Pulmonary effort is normal. No respiratory distress.      Breath sounds: Normal breath sounds. No wheezing or rales.   Abdominal:      General: Bowel sounds are normal. There is no distension.      Palpations: Abdomen is soft. There is no mass.      Tenderness: There is no abdominal tenderness.   Musculoskeletal:         General: No tenderness or deformity. Normal range of motion.      Cervical back: Normal range of motion and neck supple.      Right lower leg: No edema.      Left lower leg: No edema.   Lymphadenopathy:      Cervical: No cervical adenopathy.   Skin:     Findings: No rash.   Neurological:      Mental Status: She is alert and oriented to person, place, and time.      Cranial Nerves: No cranial nerve deficit.      Coordination: Coordination normal.   Psychiatric:         Behavior: Behavior normal.         Thought Content: Thought content normal.         Judgment: Judgment normal.       Krista Ho MD    "

## 2024-03-01 NOTE — ASSESSMENT & PLAN NOTE
Patient continues to have symptoms of Gerd tends to take 2 Protonix frequently. She does follow with G.I.

## 2024-03-01 NOTE — PROGRESS NOTES
"Daily Note     Today's date: 3/1/2024  Patient name: Tonia Avelar  : 1955  MRN: [id number]  Referring provider: Bruce Mayorga,*  Dx:   Encounter Diagnosis     ICD-10-CM    1. Primary osteoarthritis of left knee  M17.12       2. Acute left-sided low back pain with left-sided sciatica  M54.42                      Subjective: Patient reports that she is doing well today with no complaints of pain today. Notes that she has been complaint with her HEP.       Objective: See treatment diary below      Assessment: Patient performed all activities this session with good tolerance and no increased symptoms. Continued to focus on LE strengthening. Pt would benefit form continued pre op strengthening.       Plan: Continue per plan of care.      Visit/Unit Tracking  AUTH Status:  Date 2/1 2/5 2/8 2/14 2/16 2/23 2/28 3/1       [facility] CBC - no auth Used 1 2 3 4 5 6 7 8        Remaining                     Precautions: N/a      Manuals 2.5 2.8 2/16 2/23 2/28 3/1       Caudal hip glides  JF b/l JF b/l JF JF KK nv                                              Neuro Re-Ed 2.5 2.8 2/16 2/23 2/28 3/1       SLR RLE 2x10 Standing wall ball 15x5\" Standing wall ball  20x5\" 3x10 3x10 3x10       Quad sets LLE 20x5\"            Mini squats 3x10 3x10 3x10 3x10 3x10 3x10       Leg press  3x10 85# 3x10  85# 3x10 95# 3x10 95# 3x10 95#       STS    2x10 2x10 2x10       Standing hip abd    2x10 b/l 2x10 b/l 2x10 b/l       Step ups b/l    2x10 np 2x10       Ther Ex 2.5            LTR 20x5\" 20x5\" 20x5\" 20x5\" 20x5\" 20x5\"       Bridge 3x10 3x10 3x10 3x10 3x10 3x10       SKTC             Pt ed JF JF           Nu step 10' 10' Recum bike  x10'  Recum bike  x10' Recum bike x10'                                              Ther Activity                                       Gait Training                                       Modalities                                                  "

## 2024-03-07 NOTE — PRE-PROCEDURE INSTRUCTIONS
Pre-Surgery Instructions:   Medication Instructions    ascorbic acid (VITAMIN C) 500 MG tablet Hold day of surgery.    CALCIUM PO Stop taking 7 days prior to surgery.    folic acid (FOLVITE) 1 mg tablet Stop taking 7 days prior to surgery.    losartan (COZAAR) 50 mg tablet Take night before surgery    metoprolol succinate (TOPROL-XL) 50 mg 24 hr tablet Take day of surgery.    pantoprazole (PROTONIX) 40 mg tablet Take day of surgery.    PARoxetine (PAXIL) 10 mg tablet Take day of surgery.    VITAMIN D PO Stop taking 7 days prior to surgery.    Medication instructions for day surgery reviewed. Please use only a sip of water to take your instructed medications. Avoid aspirin and all over the counter vitamins, supplements and NSAIDS for one week prior to surgery per anesthesia guidelines. Tylenol is ok to take as needed. States couldn't tolerate the iron supplements , and has informed Dr Mohr nurse navigator Jackie earlier this morning.      You will receive a call one business day prior to surgery with an arrival time and hospital directions. If your surgery is scheduled on a Monday, the hospital will be calling you on the Friday prior to your surgery. If you have not heard from anyone by 8pm, please call the hospital supervisor through the hospital  at [phone]. ([place] [phone] or [place] [phone]).    Do not eat or drink anything after midnight the night before your surgery, including candy, mints, lifesavers, or chewing gum. Do not drink alcohol 24hrs before your surgery. Try not to smoke at least 24hrs before your surgery.       Follow the pre surgery showering instructions as listed in the “My Surgical Experience Booklet” or otherwise provided by your surgeon's office. Do not use a blade to shave the surgical area 1 week before surgery. It is okay to use a clean electric clippers up to 24 hours before surgery. Do not apply any lotions, creams, including makeup, cologne, deodorant, or  perfumes after showering on the day of your surgery. Do not use dry shampoo, hair spray, hair gel, or any type of hair products.     No contact lenses, eye make-up, or artificial eyelashes. Remove nail polish, including gel polish, and any artificial, gel, or acrylic nails if possible. Remove all jewelry including rings and body piercing jewelry.     Wear causal clothing that is easy to take on and off. Consider your type of surgery.    Keep any valuables, jewelry, piercings at home. Please bring any specially ordered equipment (sling, braces) if indicated.    Arrange for a responsible person to drive you to and from the hospital on the day of your surgery. Please confirm the visitor policy for the day of your procedure when you receive your phone call with an arrival time.     Call the surgeon's office with any new illnesses, exposures, or additional questions prior to surgery.    Please reference your “My Surgical Experience Booklet” for additional information to prepare for your upcoming surgery. Verb understandign of content of Ortho Class Pt Education, will be in touch w/ nurse navigator w/ any concerns or changes in health before surgery.

## 2024-03-13 DIAGNOSIS — Z96.652 AFTERCARE FOLLOWING LEFT KNEE JOINT REPLACEMENT SURGERY: Primary | ICD-10-CM

## 2024-03-13 DIAGNOSIS — Z47.1 AFTERCARE FOLLOWING LEFT KNEE JOINT REPLACEMENT SURGERY: Primary | ICD-10-CM

## 2024-03-18 ENCOUNTER — TELEPHONE (OUTPATIENT)
Age: 69
End: 2024-03-18

## 2024-03-18 NOTE — TELEPHONE ENCOUNTER
Pt called in w/ questions.     States she had a derm f/u. There is an area of concern on her back and they would like to schedule her for an excision. Pt is asking how long after surgery she should schedule this and if she needs abx prior to excision. Will route.

## 2024-03-18 NOTE — TELEPHONE ENCOUNTER
ROSEANN w/ patient informing her to wait at least 2-4 weeks following joint replacement surgery. Direct callback provided.

## 2024-03-18 NOTE — TELEPHONE ENCOUNTER
Caller: Patient    Doctor/Office: [facility]    Call regarding :  Questions about upcoming 3/25 Total L knee sx.     Call was transferred to: Nurse Navigator

## 2024-03-24 NOTE — DISCHARGE INSTR - AVS FIRST PAGE
Discharge Instructions - Orthopedics  Tonia Avelar 68 y.o. female MRN: [id number]  Unit/Bed#:     Weight Bearing Status:                                           Weight Bearing as tolerated to the left lower extremity.    DVT prophylaxis:  Complete course of Lovenox as directed    Pain:  Continue analgesics as directed    Showering Instructions:   Do not shower until followup     Dressing Instructions:   Keep dressing clean, dry and intact until follow up appointment.    Driving Instructions:  No driving until cleared by Orthopaedic Surgery.    PT/OT:  Continue PT/OT on outpatient basis as directed    Appt Instructions:   If you do not have your appointment, please call the clinic at [phone]  Otherwise followup as scheduled on 4/2/2024 in [place]    Contact the office sooner if you experience any increased numbness/tingling in the extremities.      Miscellaneous:  None

## 2024-03-25 ENCOUNTER — ANESTHESIA (OUTPATIENT)
Age: 69
End: 2024-03-25
Payer: COMMERCIAL

## 2024-03-25 ENCOUNTER — HOSPITAL ENCOUNTER (OUTPATIENT)
Age: 69
Setting detail: OUTPATIENT SURGERY
Discharge: HOME | End: 2024-03-27
Attending: ORTHOPAEDIC SURGERY | Admitting: ORTHOPAEDIC SURGERY
Payer: COMMERCIAL

## 2024-03-25 DIAGNOSIS — M17.12 PRIMARY OSTEOARTHRITIS OF LEFT KNEE: Primary | ICD-10-CM

## 2024-03-25 PROCEDURE — 97167 OT EVAL HIGH COMPLEX 60 MIN: CPT

## 2024-03-25 PROCEDURE — 97110 THERAPEUTIC EXERCISES: CPT | Performed by: PHYSICAL THERAPIST

## 2024-03-25 PROCEDURE — C1713 ANCHOR/SCREW BN/BN,TIS/BN: HCPCS | Performed by: ORTHOPAEDIC SURGERY

## 2024-03-25 PROCEDURE — S2900 ROBOTIC SURGICAL SYSTEM: HCPCS | Performed by: ORTHOPAEDIC SURGERY

## 2024-03-25 PROCEDURE — NC001 PR NO CHARGE: Performed by: ORTHOPAEDIC SURGERY

## 2024-03-25 PROCEDURE — C1776 JOINT DEVICE (IMPLANTABLE): HCPCS | Performed by: ORTHOPAEDIC SURGERY

## 2024-03-25 PROCEDURE — 27447 TOTAL KNEE ARTHROPLASTY: CPT | Performed by: ORTHOPAEDIC SURGERY

## 2024-03-25 PROCEDURE — 27447 TOTAL KNEE ARTHROPLASTY: CPT

## 2024-03-25 PROCEDURE — 99024 POSTOP FOLLOW-UP VISIT: CPT | Performed by: PHYSICIAN ASSISTANT

## 2024-03-25 PROCEDURE — 97535 SELF CARE MNGMENT TRAINING: CPT

## 2024-03-25 PROCEDURE — 97530 THERAPEUTIC ACTIVITIES: CPT | Performed by: PHYSICAL THERAPIST

## 2024-03-25 PROCEDURE — 97163 PT EVAL HIGH COMPLEX 45 MIN: CPT | Performed by: PHYSICAL THERAPIST

## 2024-03-25 DEVICE — ATTUNE KNEE SYSTEM TIBIAL BASE FIXED BEARING SIZE 3 CEMENTED
Type: IMPLANTABLE DEVICE | Site: KNEE | Status: FUNCTIONAL
Brand: ATTUNE

## 2024-03-25 DEVICE — ATTUNE KNEE SYSTEM TIBIAL INSERT FIXED BEARING POSTERIOR STABILIZED 4 5MM AOX
Type: IMPLANTABLE DEVICE | Site: KNEE | Status: FUNCTIONAL
Brand: ATTUNE

## 2024-03-25 DEVICE — ATTUNE KNEE SYSTEM FEMORAL POSTERIOR STABILIZED NARROW SIZE 4N LEFT CEMENTED
Type: IMPLANTABLE DEVICE | Site: KNEE | Status: FUNCTIONAL
Brand: ATTUNE

## 2024-03-25 DEVICE — ATTUNE PATELLA MEDIALIZED DOME 35MM CEMENTED AOX
Type: IMPLANTABLE DEVICE | Site: KNEE | Status: FUNCTIONAL
Brand: ATTUNE

## 2024-03-25 DEVICE — SMARTSET HV HIGH VISCOSITY BONE CEMENT 40G
Type: IMPLANTABLE DEVICE | Site: KNEE | Status: FUNCTIONAL
Brand: SMARTSET

## 2024-03-25 RX ORDER — ONDANSETRON 2 MG/ML
INJECTION INTRAMUSCULAR; INTRAVENOUS AS NEEDED
Status: DISCONTINUED | OUTPATIENT
Start: 2024-03-25 | End: 2024-03-25

## 2024-03-25 RX ORDER — DOCUSATE SODIUM 100 MG/1
100 CAPSULE, LIQUID FILLED ORAL 2 TIMES DAILY
Status: DISCONTINUED | OUTPATIENT
Start: 2024-03-25 | End: 2024-03-27 | Stop reason: HOSPADM

## 2024-03-25 RX ORDER — ONDANSETRON 2 MG/ML
4 INJECTION INTRAMUSCULAR; INTRAVENOUS EVERY 6 HOURS PRN
Status: DISCONTINUED | OUTPATIENT
Start: 2024-03-25 | End: 2024-03-27 | Stop reason: HOSPADM

## 2024-03-25 RX ORDER — MAGNESIUM HYDROXIDE 1200 MG/15ML
LIQUID ORAL AS NEEDED
Status: DISCONTINUED | OUTPATIENT
Start: 2024-03-25 | End: 2024-03-25 | Stop reason: HOSPADM

## 2024-03-25 RX ORDER — OXYCODONE HYDROCHLORIDE 10 MG/1
10 TABLET ORAL EVERY 4 HOURS PRN
Status: DISCONTINUED | OUTPATIENT
Start: 2024-03-25 | End: 2024-03-27 | Stop reason: HOSPADM

## 2024-03-25 RX ORDER — PAROXETINE HYDROCHLORIDE 20 MG/1
10 TABLET, FILM COATED ORAL 2 TIMES DAILY
Status: DISCONTINUED | OUTPATIENT
Start: 2024-03-25 | End: 2024-03-27 | Stop reason: HOSPADM

## 2024-03-25 RX ORDER — DEXAMETHASONE SODIUM PHOSPHATE 10 MG/ML
INJECTION, SOLUTION INTRAMUSCULAR; INTRAVENOUS AS NEEDED
Status: DISCONTINUED | OUTPATIENT
Start: 2024-03-25 | End: 2024-03-25

## 2024-03-25 RX ORDER — ONDANSETRON 2 MG/ML
4 INJECTION INTRAMUSCULAR; INTRAVENOUS ONCE AS NEEDED
Status: DISCONTINUED | OUTPATIENT
Start: 2024-03-25 | End: 2024-03-25 | Stop reason: HOSPADM

## 2024-03-25 RX ORDER — SODIUM CHLORIDE, SODIUM LACTATE, POTASSIUM CHLORIDE, CALCIUM CHLORIDE 600; 310; 30; 20 MG/100ML; MG/100ML; MG/100ML; MG/100ML
125 INJECTION, SOLUTION INTRAVENOUS CONTINUOUS
Status: DISCONTINUED | OUTPATIENT
Start: 2024-03-25 | End: 2024-03-27 | Stop reason: HOSPADM

## 2024-03-25 RX ORDER — METOCLOPRAMIDE HYDROCHLORIDE 5 MG/ML
10 INJECTION INTRAMUSCULAR; INTRAVENOUS ONCE AS NEEDED
Status: DISCONTINUED | OUTPATIENT
Start: 2024-03-25 | End: 2024-03-25 | Stop reason: HOSPADM

## 2024-03-25 RX ORDER — OXYCODONE HYDROCHLORIDE 5 MG/1
5 TABLET ORAL EVERY 4 HOURS PRN
Status: DISCONTINUED | OUTPATIENT
Start: 2024-03-25 | End: 2024-03-27 | Stop reason: HOSPADM

## 2024-03-25 RX ORDER — ACETAMINOPHEN 325 MG/1
975 TABLET ORAL ONCE
Status: COMPLETED | OUTPATIENT
Start: 2024-03-25 | End: 2024-03-25

## 2024-03-25 RX ORDER — FENTANYL CITRATE/PF 50 MCG/ML
25 SYRINGE (ML) INJECTION
Status: DISCONTINUED | OUTPATIENT
Start: 2024-03-25 | End: 2024-03-25 | Stop reason: HOSPADM

## 2024-03-25 RX ORDER — CEFAZOLIN SODIUM 1 G/50ML
1000 SOLUTION INTRAVENOUS EVERY 8 HOURS
Status: COMPLETED | OUTPATIENT
Start: 2024-03-25 | End: 2024-03-26

## 2024-03-25 RX ORDER — GABAPENTIN 300 MG/1
300 CAPSULE ORAL ONCE
Status: COMPLETED | OUTPATIENT
Start: 2024-03-25 | End: 2024-03-25

## 2024-03-25 RX ORDER — BUPIVACAINE HYDROCHLORIDE 7.5 MG/ML
INJECTION, SOLUTION INTRASPINAL AS NEEDED
Status: DISCONTINUED | OUTPATIENT
Start: 2024-03-25 | End: 2024-03-25

## 2024-03-25 RX ORDER — CALCIUM CARBONATE 500 MG/1
1000 TABLET, CHEWABLE ORAL DAILY PRN
Status: DISCONTINUED | OUTPATIENT
Start: 2024-03-25 | End: 2024-03-27 | Stop reason: HOSPADM

## 2024-03-25 RX ORDER — ENOXAPARIN SODIUM 100 MG/ML
40 INJECTION SUBCUTANEOUS DAILY
Status: DISCONTINUED | OUTPATIENT
Start: 2024-03-25 | End: 2024-03-27 | Stop reason: HOSPADM

## 2024-03-25 RX ORDER — SODIUM CHLORIDE, SODIUM LACTATE, POTASSIUM CHLORIDE, CALCIUM CHLORIDE 600; 310; 30; 20 MG/100ML; MG/100ML; MG/100ML; MG/100ML
125 INJECTION, SOLUTION INTRAVENOUS CONTINUOUS
Status: DISCONTINUED | OUTPATIENT
Start: 2024-03-25 | End: 2024-03-25

## 2024-03-25 RX ORDER — PROPOFOL 10 MG/ML
INJECTION, EMULSION INTRAVENOUS CONTINUOUS PRN
Status: DISCONTINUED | OUTPATIENT
Start: 2024-03-25 | End: 2024-03-25

## 2024-03-25 RX ORDER — CHLORHEXIDINE GLUCONATE ORAL RINSE 1.2 MG/ML
15 SOLUTION DENTAL ONCE
Status: COMPLETED | OUTPATIENT
Start: 2024-03-25 | End: 2024-03-25

## 2024-03-25 RX ORDER — PRAVASTATIN SODIUM 20 MG
80 TABLET ORAL
Status: DISCONTINUED | OUTPATIENT
Start: 2024-03-25 | End: 2024-03-27 | Stop reason: HOSPADM

## 2024-03-25 RX ORDER — METOPROLOL SUCCINATE 25 MG/1
50 TABLET, EXTENDED RELEASE ORAL DAILY
Status: DISCONTINUED | OUTPATIENT
Start: 2024-03-26 | End: 2024-03-27 | Stop reason: HOSPADM

## 2024-03-25 RX ORDER — OXYCODONE HYDROCHLORIDE 5 MG/1
5 TABLET ORAL EVERY 6 HOURS PRN
Qty: 20 TABLET | Refills: 0 | Status: SHIPPED | OUTPATIENT
Start: 2024-03-25 | End: 2024-04-04

## 2024-03-25 RX ORDER — BUPIVACAINE HYDROCHLORIDE 2.5 MG/ML
INJECTION, SOLUTION EPIDURAL; INFILTRATION; INTRACAUDAL
Status: COMPLETED | OUTPATIENT
Start: 2024-03-25 | End: 2024-03-25

## 2024-03-25 RX ORDER — MIDAZOLAM HYDROCHLORIDE 2 MG/2ML
INJECTION, SOLUTION INTRAMUSCULAR; INTRAVENOUS AS NEEDED
Status: DISCONTINUED | OUTPATIENT
Start: 2024-03-25 | End: 2024-03-25

## 2024-03-25 RX ORDER — GABAPENTIN 100 MG/1
100 CAPSULE ORAL EVERY 8 HOURS
Status: DISCONTINUED | OUTPATIENT
Start: 2024-03-25 | End: 2024-03-27 | Stop reason: HOSPADM

## 2024-03-25 RX ORDER — GABAPENTIN 100 MG/1
100 CAPSULE ORAL 3 TIMES DAILY
Qty: 90 CAPSULE | Refills: 0 | Status: SHIPPED | OUTPATIENT
Start: 2024-03-25

## 2024-03-25 RX ORDER — HYDROMORPHONE HCL IN WATER/PF 6 MG/30 ML
0.2 PATIENT CONTROLLED ANALGESIA SYRINGE INTRAVENOUS
Status: DISCONTINUED | OUTPATIENT
Start: 2024-03-25 | End: 2024-03-25 | Stop reason: HOSPADM

## 2024-03-25 RX ORDER — TRANEXAMIC ACID 10 MG/ML
1000 INJECTION, SOLUTION INTRAVENOUS ONCE
Status: COMPLETED | OUTPATIENT
Start: 2024-03-25 | End: 2024-03-25

## 2024-03-25 RX ORDER — METHOCARBAMOL 500 MG/1
500 TABLET, FILM COATED ORAL EVERY 6 HOURS SCHEDULED
Status: DISCONTINUED | OUTPATIENT
Start: 2024-03-25 | End: 2024-03-27 | Stop reason: HOSPADM

## 2024-03-25 RX ORDER — FENTANYL CITRATE 50 UG/ML
INJECTION, SOLUTION INTRAMUSCULAR; INTRAVENOUS AS NEEDED
Status: DISCONTINUED | OUTPATIENT
Start: 2024-03-25 | End: 2024-03-25

## 2024-03-25 RX ORDER — ACETAMINOPHEN 325 MG/1
650 TABLET ORAL EVERY 6 HOURS PRN
Status: DISCONTINUED | OUTPATIENT
Start: 2024-03-25 | End: 2024-03-27 | Stop reason: HOSPADM

## 2024-03-25 RX ORDER — HYDROMORPHONE HCL/PF 1 MG/ML
0.5 SYRINGE (ML) INJECTION EVERY 2 HOUR PRN
Status: ACTIVE | OUTPATIENT
Start: 2024-03-25 | End: 2024-03-27

## 2024-03-25 RX ORDER — GLYCOPYRROLATE 0.2 MG/ML
INJECTION INTRAMUSCULAR; INTRAVENOUS AS NEEDED
Status: DISCONTINUED | OUTPATIENT
Start: 2024-03-25 | End: 2024-03-25

## 2024-03-25 RX ORDER — BUPIVACAINE HYDROCHLORIDE 5 MG/ML
INJECTION, SOLUTION EPIDURAL; INTRACAUDAL
Status: COMPLETED | OUTPATIENT
Start: 2024-03-25 | End: 2024-03-25

## 2024-03-25 RX ORDER — SENNOSIDES 8.6 MG
650 CAPSULE ORAL EVERY 8 HOURS PRN
Qty: 30 TABLET | Refills: 0 | Status: SHIPPED | OUTPATIENT
Start: 2024-03-25

## 2024-03-25 RX ORDER — CEFAZOLIN SODIUM 2 G/50ML
2000 SOLUTION INTRAVENOUS ONCE
Status: COMPLETED | OUTPATIENT
Start: 2024-03-25 | End: 2024-03-25

## 2024-03-25 RX ORDER — METHOCARBAMOL 500 MG/1
500 TABLET, FILM COATED ORAL 3 TIMES DAILY PRN
Qty: 60 TABLET | Refills: 0 | Status: SHIPPED | OUTPATIENT
Start: 2024-03-25

## 2024-03-25 RX ADMIN — SODIUM CHLORIDE, SODIUM LACTATE, POTASSIUM CHLORIDE, AND CALCIUM CHLORIDE 125 ML/HR: .6; .31; .03; .02 INJECTION, SOLUTION INTRAVENOUS at 18:28

## 2024-03-25 RX ADMIN — OXYCODONE HYDROCHLORIDE 5 MG: 5 TABLET ORAL at 14:38

## 2024-03-25 RX ADMIN — ONDANSETRON 4 MG: 2 INJECTION INTRAMUSCULAR; INTRAVENOUS at 07:40

## 2024-03-25 RX ADMIN — SODIUM CHLORIDE, SODIUM LACTATE, POTASSIUM CHLORIDE, AND CALCIUM CHLORIDE 125 ML/HR: .6; .31; .03; .02 INJECTION, SOLUTION INTRAVENOUS at 06:25

## 2024-03-25 RX ADMIN — GABAPENTIN 100 MG: 100 CAPSULE ORAL at 22:59

## 2024-03-25 RX ADMIN — GLYCOPYRROLATE 0.2 MG: 0.2 INJECTION INTRAMUSCULAR; INTRAVENOUS at 07:49

## 2024-03-25 RX ADMIN — ACETAMINOPHEN 325MG 975 MG: 325 TABLET ORAL at 06:25

## 2024-03-25 RX ADMIN — METHOCARBAMOL TABLETS 500 MG: 500 TABLET, COATED ORAL at 11:57

## 2024-03-25 RX ADMIN — MIDAZOLAM 2 MG: 1 INJECTION INTRAMUSCULAR; INTRAVENOUS at 07:17

## 2024-03-25 RX ADMIN — CEFAZOLIN SODIUM 1000 MG: 1 SOLUTION INTRAVENOUS at 22:59

## 2024-03-25 RX ADMIN — FENTANYL CITRATE 25 MCG: 50 INJECTION INTRAMUSCULAR; INTRAVENOUS at 08:36

## 2024-03-25 RX ADMIN — ACETAMINOPHEN 325MG 650 MG: 325 TABLET ORAL at 10:03

## 2024-03-25 RX ADMIN — DOCUSATE SODIUM 100 MG: 100 CAPSULE, LIQUID FILLED ORAL at 17:26

## 2024-03-25 RX ADMIN — BUPIVACAINE HYDROCHLORIDE 20 ML: 5 INJECTION, SOLUTION EPIDURAL; INTRACAUDAL; PERINEURAL at 07:21

## 2024-03-25 RX ADMIN — METHOCARBAMOL TABLETS 500 MG: 500 TABLET, COATED ORAL at 23:00

## 2024-03-25 RX ADMIN — ENOXAPARIN SODIUM 40 MG: 40 INJECTION SUBCUTANEOUS at 19:57

## 2024-03-25 RX ADMIN — BUPIVACAINE HYDROCHLORIDE 7 ML: 2.5 INJECTION, SOLUTION EPIDURAL; INFILTRATION; INTRACAUDAL; PERINEURAL at 07:21

## 2024-03-25 RX ADMIN — ACETAMINOPHEN 325MG 650 MG: 325 TABLET ORAL at 16:11

## 2024-03-25 RX ADMIN — PHENYLEPHRINE HYDROCHLORIDE 30 MCG/MIN: 10 INJECTION INTRAVENOUS at 07:35

## 2024-03-25 RX ADMIN — SODIUM CHLORIDE, SODIUM LACTATE, POTASSIUM CHLORIDE, AND CALCIUM CHLORIDE 125 ML/HR: .6; .31; .03; .02 INJECTION, SOLUTION INTRAVENOUS at 09:50

## 2024-03-25 RX ADMIN — BUPIVACAINE HYDROCHLORIDE IN DEXTROSE 1 ML: 7.5 INJECTION, SOLUTION SUBARACHNOID at 07:33

## 2024-03-25 RX ADMIN — OXYCODONE HYDROCHLORIDE 5 MG: 5 TABLET ORAL at 10:03

## 2024-03-25 RX ADMIN — FENTANYL CITRATE 50 MCG: 50 INJECTION INTRAMUSCULAR; INTRAVENOUS at 07:17

## 2024-03-25 RX ADMIN — FENTANYL CITRATE 25 MCG: 50 INJECTION INTRAMUSCULAR; INTRAVENOUS at 08:20

## 2024-03-25 RX ADMIN — TRANEXAMIC ACID 1000 MG: 10 INJECTION, SOLUTION INTRAVENOUS at 07:35

## 2024-03-25 RX ADMIN — PROPOFOL 100 MCG/KG/MIN: 10 INJECTION, EMULSION INTRAVENOUS at 07:35

## 2024-03-25 RX ADMIN — GABAPENTIN 300 MG: 300 CAPSULE ORAL at 06:25

## 2024-03-25 RX ADMIN — CHLORHEXIDINE GLUCONATE 15 ML: 1.2 SOLUTION ORAL at 06:25

## 2024-03-25 RX ADMIN — CEFAZOLIN SODIUM 1000 MG: 1 SOLUTION INTRAVENOUS at 14:38

## 2024-03-25 RX ADMIN — CEFAZOLIN SODIUM 2000 MG: 2 SOLUTION INTRAVENOUS at 07:33

## 2024-03-25 RX ADMIN — PAROXETINE HYDROCHLORIDE 10 MG: 20 TABLET, FILM COATED ORAL at 17:26

## 2024-03-25 RX ADMIN — METHOCARBAMOL TABLETS 500 MG: 500 TABLET, COATED ORAL at 17:26

## 2024-03-25 RX ADMIN — ACETAMINOPHEN 325MG 650 MG: 325 TABLET ORAL at 22:59

## 2024-03-25 RX ADMIN — DEXAMETHASONE SODIUM PHOSPHATE 10 MG: 10 INJECTION INTRAMUSCULAR; INTRAVENOUS at 07:40

## 2024-03-25 RX ADMIN — FENTANYL CITRATE 25 MCG: 50 INJECTION INTRAMUSCULAR; INTRAVENOUS at 09:16

## 2024-03-25 RX ADMIN — GABAPENTIN 100 MG: 100 CAPSULE ORAL at 14:37

## 2024-03-25 RX ADMIN — SODIUM CHLORIDE, SODIUM LACTATE, POTASSIUM CHLORIDE, AND CALCIUM CHLORIDE: .6; .31; .03; .02 INJECTION, SOLUTION INTRAVENOUS at 08:44

## 2024-03-25 RX ADMIN — BUPIVACAINE HYDROCHLORIDE 20 ML: 2.5 INJECTION, SOLUTION EPIDURAL; INFILTRATION; INTRACAUDAL at 07:21

## 2024-03-25 NOTE — ANESTHESIA PROCEDURE NOTES
Spinal Block    Patient location during procedure: OR  Start time: 3/25/2024 7:33 AM  Reason for block: at surgeon's request and primary anesthetic  Staffing  Performed by: Idalia Mckeon CRNA  Authorized by: Sankte Ladd MD    Preanesthetic Checklist  Completed: patient identified, IV checked, site marked, risks and benefits discussed, surgical consent, monitors and equipment checked, pre-op evaluation and timeout performed  Spinal Block  Patient position: sitting  Prep: ChloraPrep  Patient monitoring: continuous pulse ox and frequent blood pressure checks  Approach: midline  Location: L3-4  Injection technique: single-shot  Needle  Needle type: pencil-tip   Needle gauge: 25 G  Needle length: 10 cm  Assessment  Sensory level: T4  Injection Assessment:  negative aspiration for heme, no paresthesia on injection and positive aspiration for clear CSF.

## 2024-03-25 NOTE — ANESTHESIA PREPROCEDURE EVALUATION
Procedure:  ARTHROPLASTY KNEE TOTAL W ROBOT (Left: Knee)    Relevant Problems   CARDIO   (+) Hypertension      GI/HEPATIC   (+) Chronic GERD      MUSCULOSKELETAL   (+) Chronic left-sided low back pain with left-sided sciatica   (+) Primary osteoarthritis of left knee      NEURO/PSYCH   (+) Anxiety disorder   (+) Chronic left-sided low back pain with left-sided sciatica      Lab Results   Component Value Date    WBC 8.32 02/29/2024    HGB 13.4 02/29/2024    HCT 43.2 02/29/2024    MCV 93 02/29/2024     02/29/2024     Lab Results   Component Value Date    K 4.6 02/29/2024    CO2 31 02/29/2024     02/29/2024    BUN 14 02/29/2024    CREATININE 0.79 02/29/2024     Lab Results   Component Value Date    INR 0.94 02/29/2024    INR 0.94 02/12/2018    PROTIME 12.5 02/29/2024    PROTIME 12.6 02/12/2018     Lab Results   Component Value Date    PTT 29 02/29/2024     Lab Results   Component Value Date    HGBA1C 5.7 (H) 02/29/2024       Type and Screen:  O    Physical Exam    Airway    Mallampati score: II  TM Distance: >3 FB  Neck ROM: full     Dental   No notable dental hx     Cardiovascular      Pulmonary      Other Findings  post-pubertal.      Anesthesia Plan  ASA Score- 2     Anesthesia Type- spinal with ASA Monitors.         Additional Monitors:     Airway Plan:            Plan Factors-Exercise tolerance (METS): >4 METS.    Chart reviewed.   Existing labs reviewed. Patient summary reviewed.                  Induction- intravenous.    Postoperative Plan- Plan for postoperative opioid use.     Informed Consent- Anesthetic plan and risks discussed with patient.  I personally reviewed this patient with the CRNA. Discussed and agreed on the Anesthesia Plan with the CRNA..

## 2024-03-25 NOTE — OCCUPATIONAL THERAPY NOTE
Occupational Therapy Evaluation     Patient Name: Tonia Avelar  Today's Date: 3/25/2024  Problem List  Active Problems:    Primary osteoarthritis of left knee    Past Medical History  Past Medical History:   Diagnosis Date    Anxiety     last assessed 8/24/15    Arthralgia of right knee     last assessed 14    Chronic interstitial cystitis     GERD (gastroesophageal reflux disease)     High cholesterol     History of palpitations     Hypertension     Hypokalemia     last assessed 3/7/13    Irritable bowel syndrome     Osteoarthritis     last assessed 12    Osteopenia     last assessed 3/21/13    PONV (postoperative nausea and vomiting)     Postmenopausal atrophic vaginitis     last assessed 3/21/13    Stress incontinence     Uterine leiomyoma     resolved      Past Surgical History  Past Surgical History:   Procedure Laterality Date    BREAST CYST ASPIRATION Left     benign    BREAST SURGERY      puncture aspiration of cyst, left, resolved     CARDIAC CATHETERIZATION       SECTION      daughter    COLONOSCOPY      1 polyp-hyperplastic by Dr. Nielsen    COLONOSCOPY      Focal active colitis-acute by Dr. Nielsen    EGD      Schatzki's ring dilated with 52 Croatian Segura, biopsy negative for Beard's by Dr. Way    EGD      Irregular Z line and biopsy positive for Beard's/intestinal metaplasia by Dr. Way    EGD  2013    Short segment Beard's biopsy positive Intestinal metaplasia , No dysplasia, fundic gland polyp by Dr. Way    ESOPHAGOGASTRODUODENOSCOPY N/A 2016    Normal-fundic gland polyp, biopsy of the EG junction normal by Dr. Way    KNEE ARTHROSCOPY      MT ARTHRP KNE CONDYLE&PLATU MEDIAL&LAT COMPARTMENTS Right 2018    Procedure: ARTHROPLASTY KNEE TOTAL;  Surgeon: Bruce Mayorga MD;  [facility]: [facility];  Service: Orthopedics  resolved  per Allscripts    VAGINAL HYSTERECTOMY      fibroids, resolved 19  1106   OT Last Visit   OT Visit Date 03/25/24   Note Type   Note type Evaluation and treatment   Additional Comments Pt greeted supine in bed, agreeable to OT evaluation   Pain Assessment   Pain Assessment Tool 0-10   Pain Score 1   Pain Location/Orientation Orientation: Left;Location: Knee   Restrictions/Precautions   Weight Bearing Precautions Per Order Yes   LLE Weight Bearing Per Order WBAT   Braces or Orthoses Other (Comment)  (hemovac)   Other Precautions Chair Alarm;Bed Alarm;WBS;Multiple lines;Fall Risk;Pain   Home Living   Type of Home House   Home Layout Other (Comment);Performs ADLs on one level;Able to live on main level with bedroom/bathroom;Laundry in basement  (bi-level, enters through garage 0 KANDI, 8+7 to main level)   Bathroom Shower/Tub Tub/shower unit   Bathroom Toilet Raised   Bathroom Equipment Commode   Bathroom Accessibility Accessible   Home Equipment Walker   Additional Comments Pt reports staying on main level with bed/bathroom. Enters through garage and has to go up 8+7 steps to get to main level.   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 [employer]   Intrinsic Gratification crafting, sewing   General   Family/Caregiver Present No   ADL   Where Assessed Edge of bed   Eating Assistance 5  Supervision/Setup   Grooming Assistance 4  Minimal Assistance   UB Bathing Assistance 4  Minimal Assistance   LB Bathing Assistance 4  Minimal  Assistance   UB Dressing Assistance 4  Minimal Assistance   LB Dressing Assistance 4  Minimal Assistance   Toileting Assistance  4  Minimal Assistance   Toileting Deficit Setup;Supervison/safety;Increased time to complete;Bedside commode   Bed Mobility   Supine to Sit 5  Supervision   Additional items Increased time required;LE management;Verbal cues   Sit to Supine 4  Minimal assistance   Additional items Assist x 1;Increased time required;Verbal cues;LE management   Transfers   Sit to Stand 4  Minimal assistance   Additional items Assist x 1;Increased time required;Verbal cues  (RW, standing balance MaxAx1.)   Stand to Sit 4  Minimal assistance   Additional items Armrests;Increased time required;Verbal cues  (RW)   Toilet transfer 4  Minimal assistance   Additional items Assist x 1;Armrests;Increased time required;Commode;Other   Additional Comments Pt stood with inc knee buckling BLE, incontinent, sat back down. Trialed standing again to get to commode but pt unable due to knee buckling and weakness. Pt stood with RW and maxA from standing balance, RN moved bed, OT moved commode for patient to stand and sit on commode without functional mobility due to buckling.   Functional Mobility   Functional Mobility   (unable to assess due to knee buckling and weakness)   Balance   Static Sitting Good   Dynamic Sitting Fair +   Static Standing Poor -  (with RW)   Activity Tolerance   Activity Tolerance Patient limited by fatigue;Treatment limited secondary to medical complications (Comment)   Medical Staff Made Aware PT Subha, Pt seen for co-evaluation with skilled Physical Therapy due to clinically unstable presentation, medical complexity, fall risk, functional balance, limited activity tolerance which is a decline from PLOF and may impact overall safety.   Nurse Made Aware RN C   RUE Assessment   RUE Assessment WFL   LUE Assessment   LUE Assessment WFL   Hand Function   Gross Motor Coordination Functional   Fine Motor  Coordination Functional   Cognition   Overall Cognitive Status WFL   Arousal/Participation Alert;Cooperative   Attention Within functional limits   Orientation Level Oriented X4   Memory Within functional limits   Following Commands Follows one step commands without difficulty   Comments Cooperative and Pleasant   Assessment   Limitation Decreased ADL status;Decreased endurance;Decreased self-care trans;Decreased high-level ADLs   Prognosis Good   Assessment Pt is a 68 y.o. female seen for OT evaluation s/p adm to  [facility]  on 3/25/2024 w/ primary osteoarthritis of left knee. Comorbidities affecting pt’s functional performance include a significant PMH of GERD, dyslipidemia, HTN. Anxiety, PONV, hx of R TKA (2018). Pt with active OT orders and activity orders for Activity beginning POD #0. Pt lives in a bilevel home with 0 KANDI through garage with granddaughter and sister. Pt has 8+7 steps to 2nd floor.  At baseline, pt was independent with ADLs/IADLs. WBAT LLE. Hemovac. Pt completed supine to sit with supervision. BP supine: 140/75. Pt completed don/doff of underwear with Nba seated EOB. EOB BP: 146/74. Pt reports feeling weak. Pt completed sit to stand with Nba, MaxA for standing balance due to BLE knee buckling and incontinence. Pt sat back down on EOB. See additional treatment session focusing on functional transfers, bed mobility, patient education. Upon evaluation, pt currently requires Nba for UB ADLs, Nba for LB ADLs, Nba for toileting, Nba for bed mobility, unable to assess for functional mobility, and Nba for transfers 2* the following deficits impacting occupational performance: weakness, decreased strength , decreased balance, decreased activity tolerance, increased pain, orthopedic restrictions, and knee buckling . These impairments, as well at pt’s personal factors of: KANDI home environment, difficulty performing ADLs, difficulty performing IADLs, difficulty performing transfers/mobility,  WBS, fall risk , functional decline , and new use of AD for functional transfers/mobility limit pt’s ability to safely engage in all baseline areas of occupation. Based on the aforementioned OT evaluation, functional performance deficits, and assessments, pt has been identified as a high complexity evaluation. Pt to continue to benefit from continued acute OT services during hospital stay to address defined deficits and to maximize level of functional independence in the following Occupational Performance areas: eating, grooming, bathing/shower, toilet hygiene, dressing, health maintenance, functional mobility, community mobility, clothing management, cleaning, household maintenance, and job performance/volunteering. From OT standpoint, recommend minimum resource intensity (pending progress) upon D/C. OT will continue to follow pt 3-5x/wk BID.   Goals   STG Time Frame 3-5   Short Term Goal #1 Pt will improve activity tolerance to G for min 30 min treatment sessions for increase engagement in functional tasks   Short Term Goal #2 Pt will complete bed mobility at a mod I level w/ G balance/safety demonstrated to decrease caregiver assistance required   Short Term Goal  Pt will complete LB dressing/self care w/ mod I using adaptive device and DME as needed   LTG Time Frame 7-10   Long Term Goal #1 Pt will complete toileting w/ mod I w/ G hygiene/thoroughness using DME as needed   Long Term Goal #2 Pt will improve functional transfers to mod I on/off all surfaces using DME as needed w/ G balance/safety   Long Term Goal Pt will improve functional mobility during ADL/IADL/leisure tasks to mod I using DME as needed w/ G balance/safety   Plan   Treatment Interventions ADL retraining;UE strengthening/ROM;Endurance training;Patient/family training;Compensatory technique education;Continued evaluation;Energy conservation;Activityengagement;Equipment evaluation/education;Functional transfer training   Goal Expiration Date  04/01/24   OT Treatment Day 0   OT Frequency 3-5x/wk  (BID)   Discharge Recommendation   Rehab Resource Intensity Level, OT III (Minimum Resource Intensity)  (pending progress)   Additional Comments  The patient's raw score on the AM-PAC Daily Activity Inpatient Short Form is 17 . A raw score of less than 19 suggests the patient may benefit from discharge to post-acute rehabilitation services. Please refer to the recommendation of the Occupational Therapist for safe discharge planning.   AM-PAC Daily Activity Inpatient   Lower Body Dressing 2   Bathing 2   Toileting 2   Upper Body Dressing 4   Grooming 3   Eating 4   Daily Activity Raw Score 17   Daily Activity Standardized Score (Calc for Raw Score >=11) 37.26   AM-PAC Applied Cognition Inpatient   Following a Speech/Presentation 4   Understanding Ordinary Conversation 4   Taking Medications 4   Remembering Where Things Are Placed or Put Away 4   Remembering List of 4-5 Errands 4   Taking Care of Complicated Tasks 4   Applied Cognition Raw Score 24   Applied Cognition Standardized Score 62.21   Additional Treatment Session   Start Time 1130   End Time 1143   Treatment Assessment Pt seen for OT treatment session focusing on ADLs/IADLs, functional mobility, functional standing tolerance, functional transfers, patient education, continued evaluation. Pt alert and cooperative throughout session. Pt with good sitting balance and poor - static standing balance. Pt limited by fatigue, weakness, knee buckling and incontinence (saddle paraesthesia) during session. Pt completed sit to stand with maxA for standing tolerance. Pt standing with maxA and use of RW with PT, RN moved bed and OT moved commode under pt due to inability to complete functional mobility. Compeleted same way back to bed, rolling bed close to have pt sit directly without taking steps during treatment today. Each time standing pt was incontinent due to saddle paraesthesia. Pt completed sit to supine with  Nba. Pt educated on LE management and pain management during session. Pt reports 1/10 pain and no dizziness. Pt's vitals include: stable end of session BP: 127/65.     Pt ended session laying supine in bed with bed alarm activated. Call bell and phone within reach. All needs met and pt reports no further questions at this time. Continue to recommend minim resource intensity (pending progress) when medically cleared. OT will continue to follow pt on caseload.   Additional Treatment Day 1   End of Consult   Education Provided Yes   Patient Position at End of Consult Supine;Bed/Chair alarm activated;All needs within reach   Nurse Communication Nurse aware of consult   End of Consult Comments Pt lying supine with bed alarm activated at end of session. Call bell and phone within reach. All needs met and pt reports no further questions for OT at this time.   Soha Kaye, OT

## 2024-03-25 NOTE — H&P
H&P Exam - Orthopedics   Tonia Avelar 68 y.o. female MRN: [id number]      Assessment/Plan   Assessment:  left Knee Osteoarthritis in this adult female who continues to have pain and dysfunction despite appropriate nonsurgical treatments    Plan:  left  Total Knee Arthroplasty.  Patient is smooth with risks, benefits, and alternatives    TOTAL KNEE REPLACEMENT INDICATIONS AND RISKS    We had a lengthy discussion with the patient regarding the potential options for treatment. The patient has had an extensive conservative management course up until this time and therefore I do not feel that any additional conservative management will provide additional relief. The patient's symptoms have progressively worsened to the point where they now limit the patient's daily activities and quality of life.     At this time, I feel that this patient would be an excellent candidate for a total knee replacement. The patient has failed non-operative care and continues to have unacceptable symptoms. We discussed the treatment options and alternatives and the risks and benefits of surgery in great detail.    While no guarantees can be made, total knee replacement has a very high success rate in terms of relieving a patient's knee pain and returning them to a more active, independent lifestyle for 10-15 years or more. All surgery carries some risk; for knee replacement, the complication rate is low but may include: death (very rare), infection, bleeding requiring transfusion, blood clots in legs traveling to lungs, nerve and/or blood vessel damage, bone fracture, persistent knee pain and/or stiffness, and repeat surgery(ies). The risk of a major complication is about 1-2 per 1000 cases. Total knee replacement should only be done if conservative treatment has failed. The revision rate for total knee replacements is about 1-2% per year; in other words, 85-95% of knee replacements may last 10 years; 75-85% last 20 years; and so on,  assuming no injury to the knee. Finally we discussed anesthesia related complications which will be discussed in greater detail with the anesthesia team before surgery.     The patient was shown total knee booklets, diagrams and/or models and all of their questions have been answered at the present time. The patient may call or come in if they have any other concerns or questions. The patient also understands the post-operative rehabilitation process and the need for their cooperation and participation, and that their results may be compromised by their lack of compliance. The patient would like to proceed. Patient is encouraged to seek additional opinions if they so desire. The patient voiced their understanding of the surgical plan and potential complications and wishes to proceed with surgery.      History of Present Illness   HPI:  Tonia Avelar is a 68 y.o. female who presents with pain in the left knee. Patient is no longer getting adequate relief from non-operative modalities. Patient is continuing to have debilitating pain from their knee, interfering with daily activities and sleep. Patient denies any concerns with infections, new neuropathies, or any acute injuries.     Historical Information  Review Of Systems:   Skin: Normal  Neuro: See HPI  Musculoskeletal: See HPI  14 point review of systems negative except as stated above     Past Medical History:   Past Medical History:   Diagnosis Date    Anxiety     last assessed 8/24/15    Arthralgia of right knee     last assessed 5/27/14    Chronic interstitial cystitis     GERD (gastroesophageal reflux disease)     High cholesterol     History of palpitations     Hypertension     Hypokalemia     last assessed 3/7/13    Irritable bowel syndrome     Osteoarthritis     last assessed 6/13/12    Osteopenia     last assessed 3/21/13    PONV (postoperative nausea and vomiting)     Postmenopausal atrophic vaginitis     last assessed 3/21/13    Stress incontinence      Uterine leiomyoma     resolved        Past Surgical History:   Past Surgical History:   Procedure Laterality Date    BREAST CYST ASPIRATION Left     benign    BREAST SURGERY      puncture aspiration of cyst, left, resolved     CARDIAC CATHETERIZATION       SECTION  1977    daughter    COLONOSCOPY  2006    1 polyp-hyperplastic by Dr. Nielsen    COLONOSCOPY      Focal active colitis-acute by Dr. Nielsen    EGD      Schatzki's ring dilated with 52 Sami Segura, biopsy negative for Beard's by Dr. Way    EGD      Irregular Z line and biopsy positive for Beard's/intestinal metaplasia by Dr. Way    EGD      Short segment Beard's biopsy positive Intestinal metaplasia , No dysplasia, fundic gland polyp by Dr. Way    ESOPHAGOGASTRODUODENOSCOPY N/A 2016    Normal-fundic gland polyp, biopsy of the EG junction normal by Dr. Way    KNEE ARTHROSCOPY      WI ARTHRP KNE CONDYLE&PLATU MEDIAL&LAT COMPARTMENTS Right 2018    Procedure: ARTHROPLASTY KNEE TOTAL;  Surgeon: Bruce Mayorga MD;  [facility];  Service: Orthopedics  resolved  per Allscripts    VAGINAL HYSTERECTOMY      fibroids, resolved        Family History:  Family history reviewed and non-contributory  Family History   Problem Relation Age of Onset    Cancer Mother     Hypertension Mother     Heart attack Father         MI    Hypertension Father     Heart disease Father         coronary artery disease    No Known Problems Sister     No Known Problems Sister     No Known Problems Daughter     Heart attack Maternal Grandmother     Stomach cancer Maternal Grandfather     Uterine cancer Paternal Grandmother     No Known Problems Paternal Grandfather     Heart attack Son 36        acute MI, NSTEMI    Cancer Maternal Aunt         nonhodgkins lymphoma    No Known Problems Paternal Aunt     No Known Problems Paternal Aunt     No Known Problems Paternal Aunt     No Known Problems Paternal Aunt      No Known Problems Paternal Aunt     Lung cancer Other     Hypertension Family     Ulcers Family         peptic       Social History:  Social History     Socioeconomic History    Marital status:      Spouse name: None    Number of children: None    Years of education: None    Highest education level: None   Occupational History    None   Tobacco Use    Smoking status: Former     Current packs/day: 0.00     Average packs/day: 0.5 packs/day for 25.0 years (12.5 ttl pk-yrs)     Types: Cigarettes     Start date:      Quit date:      Years since quittin.2    Smokeless tobacco: Never    Tobacco comments:     quit 13 years ago   / quit 3-4 yrs ago, smoked 20-25 yrs per allscripts 12    Vaping Use    Vaping status: Never Used   Substance and Sexual Activity    Alcohol use: Not Currently     Comment: rare social occasion    Drug use: No    Sexual activity: None   Other Topics Concern    None   Social History Narrative    Daily caffeine consumption; 1 serving a day    Exercise habits     Social Determinants of Health     Financial Resource Strain: Not on file   Food Insecurity: Not on file   Transportation Needs: Not on file   Physical Activity: Not on file   Stress: Not on file   Social Connections: Not on file   Intimate Partner Violence: Not on file   Housing Stability: Not on file       Allergies:   Allergies   Allergen Reactions    Bactrim [Sulfamethoxazole-Trimethoprim] Hives, Itching and Rash    Sulfa Antibiotics Itching and Rash           Labs:  0   Lab Value Date/Time    HCT 43.2 2024 0713    HCT 46.0 2023 0900    HCT 45.9 2019 1809    HGB 13.4 2024 0713    HGB 14.2 2023 0900    HGB 14.2 2019 1809    INR 0.94 2024 0713    WBC 8.32 2024 0713    WBC 8.38 2023 0900    WBC 13.40 (H) 2019 1809       Meds:    Current Facility-Administered Medications:     acetaminophen (TYLENOL) tablet 650 mg, 650 mg, Oral, Q6H PRN, Sally Meyer PA-C     calcium carbonate (TUMS) chewable tablet 1,000 mg, 1,000 mg, Oral, Daily PRN, Sally Meyer PA-C    ceFAZolin (ANCEF) IVPB (premix in dextrose) 1,000 mg 50 mL, 1,000 mg, Intravenous, Q8H, Sally Meyer PA-C    ceFAZolin (ANCEF) IVPB (premix in dextrose) 2,000 mg 50 mL, 2,000 mg, Intravenous, Once, Bruce Mayorga MD    docusate sodium (COLACE) capsule 100 mg, 100 mg, Oral, BID, Sally Meyer PA-C    enoxaparin (LOVENOX) subcutaneous injection 40 mg, 40 mg, Subcutaneous, Daily, Sally Meyer PA-C    gabapentin (NEURONTIN) capsule 100 mg, 100 mg, Oral, Q8H, Sally Meyer PA-C    HYDROmorphone (DILAUDID) injection 0.5 mg, 0.5 mg, Intravenous, Q2H PRN, Sally Meyer PA-C    lactated ringers bolus 1,000 mL, 1,000 mL, Intravenous, Once PRN **AND** lactated ringers bolus 1,000 mL, 1,000 mL, Intravenous, Once PRN, Sally Meyer PA-C    lactated ringers infusion, 125 mL/hr, Intravenous, Continuous, Sally Meyer PA-C    lactated ringers infusion, 125 mL/hr, Intravenous, Continuous, Bruce Mayorga MD, Last Rate: 125 mL/hr at 03/25/24 0625, 125 mL/hr at 03/25/24 0625    methocarbamol (ROBAXIN) tablet 500 mg, 500 mg, Oral, Q6H NESHA, Sally Meyer PA-C    metoprolol succinate (TOPROL-XL) 24 hr tablet 50 mg, 50 mg, Oral, Daily, Sally Meyer PA-C    ondansetron (ZOFRAN) injection 4 mg, 4 mg, Intravenous, Q6H PRN, Sally Meyer PA-C    oxyCODONE (ROXICODONE) immediate release tablet 10 mg, 10 mg, Oral, Q4H PRN, Sally Meyer PA-C    oxyCODONE (ROXICODONE) IR tablet 5 mg, 5 mg, Oral, Q4H PRN, Sally Meyer PA-C    PARoxetine (PAXIL) tablet 10 mg, 10 mg, Oral, BID, Sally Meyer PA-C    pravastatin (PRAVACHOL) tablet 80 mg, 80 mg, Oral, Daily With Dinner, Sally Meyer PA-C    sodium chloride 0.9 % bolus 1,000 mL, 1,000 mL, Intravenous, Once PRN **AND** sodium chloride 0.9 % bolus 1,000 mL, 1,000 mL, Intravenous, Once PRN, Sally Meyer PA-C    tranexamic acid (CYKLOKAPRON) 1000-0.7 MG/100ML-% injection 1,000 mg, 1,000 mg,  "Intravenous, Once, Bruce Mayorga MD    Blood Culture:   No results found for: \"BLOODCX\"    Wound Culture:   No results found for: \"WOUNDCULT\"    Ins and Outs:  No intake/output data recorded.          Physical Exam  /69   Pulse (!) 54   Temp (!) 97.2 °F (36.2 °C) (Temporal)   Resp 22   Ht 4' 11\" (1.499 m)   Wt 64.9 kg (143 lb)   SpO2 97%   BMI 28.88 kg/m²   /69   Pulse (!) 54   Temp (!) 97.2 °F (36.2 °C) (Temporal)   Resp 22   Ht 4' 11\" (1.499 m)   Wt 64.9 kg (143 lb)   SpO2 97%   BMI 28.88 kg/m²   Gen: No acute distress, resting comfortably in bed  HEENT: Eyes clear, moist mucus membranes, hearing intact  Respiratory: No audible wheezing or stridor  Cardiovascular: Well Perfused peripherally, 2+ distal pulse  Abdomen: nondistended, no peritoneal signs  Ortho Exam: limited ROM due to pain and mechanical blocking  Neuro Exam: intact    Lab Results: Reviewed  Imaging: Reviewed   "

## 2024-03-25 NOTE — PROGRESS NOTES
"Progress Note - Orthopedics  Tonia Avelar 68 y.o. female MRN: [id number]  Unit/Bed#: [facility] -01 Encounter: [id number]    Assessment:  68 y.o F w/ L OA s/p L TKA   -VSS- asymptomatic bradycardia w/ hx and on Toprol (took morning dose)  -minimal drain OP      Plan:  -Continue WBAT LLE  -Continue HV drain, likely to be removed in AM  -NESHA/PRN pain meds.  -Continue Antibiotics  -IVF  -Encourage IS  -Continue DVT ppx- Lovenox      Subjective: Patient resting comfortably in bed, pain controlled. Denies CP/SOB, using IS yet. Denies nausea. Urinary retention improving.    Objective:     Blood pressure 101/63, pulse (!) 50, temperature 98 °F (36.7 °C), resp. rate 17, height 4' 11\" (1.499 m), weight 64.9 kg (143 lb), SpO2 92%.,Body mass index is 28.88 kg/m².      Intake/Output Summary (Last 24 hours) at 3/25/2024 1913  Last data filed at 3/25/2024 1819  Gross per 24 hour   Intake 2120 ml   Output 1225 ml   Net 895 ml       Invasive Devices       Peripheral Intravenous Line  Duration             Peripheral IV 03/25/24 Right;Ventral (anterior) Hand <1 day              Drain  Duration             Closed/Suction Drain Anterior;Left Knee Accordion 10 Fr. <1 day                    Physical Exam  Vitals and nursing note reviewed.   Constitutional:       General: She is not in acute distress.     Appearance: She is well-developed.   HENT:      Head: Normocephalic and atraumatic.   Eyes:      Conjunctiva/sclera: Conjunctivae normal.   Cardiovascular:      Rate and Rhythm: Normal rate and regular rhythm.      Heart sounds: No murmur heard.  Pulmonary:      Effort: Pulmonary effort is normal. No respiratory distress.      Breath sounds: Normal breath sounds.   Abdominal:      Palpations: Abdomen is soft.      Tenderness: There is no abdominal tenderness.   Musculoskeletal:         General: No swelling.      Cervical back: Neck supple.      Comments: ACE wrap in place with ice, drain in place   Skin:     General: Skin is warm " and dry.      Capillary Refill: Capillary refill takes less than 2 seconds.   Neurological:      Mental Status: She is alert.   Psychiatric:         Mood and Affect: Mood normal.            Scheduled Meds:  Current Facility-Administered Medications   Medication Dose Route Frequency Provider Last Rate    acetaminophen  650 mg Oral Q6H PRN Sally Betsy, PA-C      calcium carbonate  1,000 mg Oral Daily PRN Sally Betsy, PA-C      cefazolin  1,000 mg Intravenous Q8H Sally Betsy, PA-C 1,000 mg (03/25/24 1438)    docusate sodium  100 mg Oral BID Sally Meyer, PA-C      enoxaparin  40 mg Subcutaneous Daily Sally Meyer, PA-C      gabapentin  100 mg Oral Q8H Sally Meyer, PA-C      HYDROmorphone  0.5 mg Intravenous Q2H PRN Sally Meyer, PA-C      lactated ringers  1,000 mL Intravenous Once PRN Sally Betsy PA-C      And    lactated ringers  1,000 mL Intravenous Once PRN Sally Meyer, PA-C      lactated ringers  125 mL/hr Intravenous Continuous Sally Betsy PA-C 125 mL/hr (03/25/24 1828)    methocarbamol  500 mg Oral Q6H NESHA JAMEL Cazares-C      [START ON 3/26/2024] metoprolol succinate  50 mg Oral Daily Sally Meyer PA-C      ondansetron  4 mg Intravenous Q6H PRN Sally Meyer, PA-C      oxyCODONE  10 mg Oral Q4H PRN Sally Meyer, PA-C      oxyCODONE  5 mg Oral Q4H PRN Sally Meyer, PA-C      PARoxetine  10 mg Oral BID Sally Meyer, PA-C      povidone-iodine (BETADINE) 10 % 20 Application in sodium chloride 0.9 % 500 mL irrigation bottle   Irrigation Once Bruce Mayorga MD      pravastatin  80 mg Oral Daily With Dinner Sally Meyer, PA-C      sodium chloride  1,000 mL Intravenous Once PRN Sally Meyer, PA-C      And    sodium chloride  1,000 mL Intravenous Once PRN Sally Betsy, PA-C       Continuous Infusions:lactated ringers, 125 mL/hr, Last Rate: 125 mL/hr (03/25/24 1828)      PRN Meds:.  acetaminophen    calcium carbonate    HYDROmorphone    lactated ringers **AND** lactated ringers    ondansetron    oxyCODONE    oxyCODONE     sodium chloride **AND** sodium chloride      Lab, Imaging and other studies:I have personally reviewed pertinent lab results.    VTE Pharmacologic Prophylaxis: Enoxaparin (Lovenox)  VTE Mechanical Prophylaxis: sequential compression device      Jackie Tan PA-C  3/25/2024 7:13 PM

## 2024-03-25 NOTE — PLAN OF CARE
Problem: PAIN - ADULT  Goal: Verbalizes/displays adequate comfort level or baseline comfort level  Description: Interventions:  - Encourage patient to monitor pain and request assistance  - Assess pain using appropriate pain scale  - Administer analgesics based on type and severity of pain and evaluate response  - Implement non-pharmacological measures as appropriate and evaluate response  - Consider cultural and social influences on pain and pain management  - Notify physician/advanced practitioner if interventions unsuccessful or patient reports new pain  Outcome: Progressing     Problem: INFECTION - ADULT  Goal: Absence or prevention of progression during hospitalization  Description: INTERVENTIONS:  - Assess and monitor for signs and symptoms of infection  - Monitor lab/diagnostic results  - Monitor all insertion sites, i.e. indwelling lines, tubes, and drains  - Monitor endotracheal if appropriate and nasal secretions for changes in amount and color  - Mitchell appropriate cooling/warming therapies per order  - Administer medications as ordered  - Instruct and encourage patient and family to use good hand hygiene technique  - Identify and instruct in appropriate isolation precautions for identified infection/condition  Outcome: Progressing  Goal: Absence of fever/infection during neutropenic period  Description: INTERVENTIONS:  - Monitor WBC    Outcome: Progressing     Problem: SAFETY ADULT  Goal: Patient will remain free of falls  Description: INTERVENTIONS:  - Educate patient/family on patient safety including physical limitations  - Instruct patient to call for assistance with activity   - Consult OT/PT to assist with strengthening/mobility   - Keep Call bell within reach  - Keep bed low and locked with side rails adjusted as appropriate  - Keep care items and personal belongings within reach  - Initiate and maintain comfort rounds  - Make Fall Risk Sign visible to staff  - Offer Toileting every 2 Hours,  in advance of need  - Initiate/Maintain bed/chair alarm  - Obtain necessary fall risk management equipment: rolling walker  - Apply yellow socks and bracelet for high fall risk patients  - Consider moving patient to room near nurses station  Outcome: Progressing  Goal: Maintain or return to baseline ADL function  Description: INTERVENTIONS:  -  Assess patient's ability to carry out ADLs; assess patient's baseline for ADL function and identify physical deficits which impact ability to perform ADLs (bathing, care of mouth/teeth, toileting, grooming, dressing, etc.)  - Assess/evaluate cause of self-care deficits   - Assess range of motion  - Assess patient's mobility; develop plan if impaired  - Assess patient's need for assistive devices and provide as appropriate  - Encourage maximum independence but intervene and supervise when necessary  - Involve family in performance of ADLs  - Assess for home care needs following discharge   - Consider OT consult to assist with ADL evaluation and planning for discharge  - Provide patient education as appropriate  Outcome: Progressing  Goal: Maintains/Returns to pre admission functional level  Description: INTERVENTIONS:  - Perform AM-PAC 6 Click Basic Mobility/ Daily Activity assessment daily.  - Set and communicate daily mobility goal to care team and patient/family/caregiver.   - Collaborate with rehabilitation services on mobility goals if consulted  - Perform Range of Motion 3 times a day.  - Reposition patient every 2 hours.  - Dangle patient 3 times a day  - Stand patient 3 times a day  - Ambulate patient 3 times a day  - Out of bed to chair 3 times a day   - Out of bed for meals 3 times a day  - Out of bed for toileting  - Record patient progress and toleration of activity level   Outcome: Progressing     Problem: DISCHARGE PLANNING  Goal: Discharge to home or other facility with appropriate resources  Description: INTERVENTIONS:  - Identify barriers to discharge  w/patient and caregiver  - Arrange for needed discharge resources and transportation as appropriate  - Identify discharge learning needs (meds, wound care, etc.)  - Arrange for interpretive services to assist at discharge as needed  - Refer to Case Management Department for coordinating discharge planning if the patient needs post-hospital services based on physician/advanced practitioner order or complex needs related to functional status, cognitive ability, or social support system  Outcome: Progressing     Problem: Knowledge Deficit  Goal: Patient/family/caregiver demonstrates understanding of disease process, treatment plan, medications, and discharge instructions  Description: Complete learning assessment and assess knowledge base.  Interventions:  - Provide teaching at level of understanding  - Provide teaching via preferred learning methods  Outcome: Progressing

## 2024-03-25 NOTE — ANESTHESIA POSTPROCEDURE EVALUATION
Post-Op Assessment Note    CV Status:  Stable    Pain management: adequate       Mental Status:  Alert and awake   Hydration Status:  Euvolemic   PONV Controlled:  Controlled   Airway Patency:  Patent     Post Op Vitals Reviewed: Yes    No anethesia notable event occurred.    Staff: Anesthesiologist               BP   105/53   Temp   97.5   Pulse  66   Resp   18   SpO2   94% on RA

## 2024-03-25 NOTE — ANESTHESIA PROCEDURE NOTES
Peripheral Block    Patient location during procedure: holding area  Start time: 3/25/2024 7:23 AM  Reason for block: at surgeon's request and post-op pain management  Staffing  Performed by: Sanket Ladd MD  Authorized by: Sanket Ladd MD    Preanesthetic Checklist  Completed: patient identified, IV checked, site marked, risks and benefits discussed, surgical consent, monitors and equipment checked, pre-op evaluation and timeout performed  Peripheral Block  Patient position: supine  Prep: ChloraPrep  Patient monitoring: frequent blood pressure checks, continuous pulse oximetry and heart rate  Block type: IPACK  Laterality: left  Injection technique: single-shot  Procedures: ultrasound guided, Ultrasound guidance required for the procedure to increase accuracy and safety of medication placement and decrease risk of complications.  Ultrasound permanent image savedbupivacaine (PF) (MARCAINE) 0.25 % injection 20 mL - Perineural   20 mL - 3/25/2024 7:21:00 AM  Needle  Needle type: Stimuplex   Needle gauge: 20 G  Needle length: 4 in  Needle localization: anatomical landmarks and ultrasound guidance  Assessment  Injection assessment: incremental injection, frequent aspiration, injected with ease, negative aspiration, negative for heart rate change, no paresthesia on injection, no symptoms of intraneural/intravenous injection and needle tip visualized at all times  Paresthesia pain: none  Post-procedure:  site cleaned  patient tolerated the procedure well with no immediate complications

## 2024-03-25 NOTE — ANESTHESIA PROCEDURE NOTES
Peripheral Block    Patient location during procedure: holding area  Start time: 3/25/2024 7:21 AM  Reason for block: at surgeon's request and post-op pain management  Staffing  Performed by: Sanket Ladd MD  Authorized by: Sanket Ladd MD    Preanesthetic Checklist  Completed: patient identified, IV checked, site marked, risks and benefits discussed, surgical consent, monitors and equipment checked, pre-op evaluation and timeout performed  Peripheral Block  Patient position: supine  Prep: ChloraPrep  Patient monitoring: frequent blood pressure checks, continuous pulse oximetry and heart rate  Block type: Adductor Canal  Laterality: left  Injection technique: single-shot  Procedures: ultrasound guided, Ultrasound guidance required for the procedure to increase accuracy and safety of medication placement and decrease risk of complications.  Ultrasound permanent image savedbupivacaine (PF) (MARCAINE) 0.25 % injection 20 mL - Perineural   7 mL - 3/25/2024 7:21:00 AM  bupivacaine (PF) (MARCAINE) 0.5 % injection 20 mL - Perineural   20 mL - 3/25/2024 7:21:00 AM  Needle  Needle type: Stimuplex   Needle gauge: 20 G  Needle length: 4 in  Needle localization: anatomical landmarks and ultrasound guidance  Assessment  Injection assessment: incremental injection, frequent aspiration, injected with ease, negative aspiration, negative for heart rate change, no paresthesia on injection, no symptoms of intraneural/intravenous injection and needle tip visualized at all times  Paresthesia pain: none  Post-procedure:  site cleaned  patient tolerated the procedure well with no immediate complications

## 2024-03-25 NOTE — PLAN OF CARE
Problem: PHYSICAL THERAPY ADULT  Goal: Performs mobility at highest level of function for planned discharge setting.  See evaluation for individualized goals.  Description: Treatment/Interventions: Functional transfer training, LE strengthening/ROM, Elevations, Therapeutic exercise, Endurance training, Patient/family training, Bed mobility, Gait training, Spoke to nursing, OT  Equipment Recommended: Walker (pt owns)       See flowsheet documentation for full assessment, interventions and recommendations.  3/25/2024 1847 by Subha Patino PT  Outcome: Not Progressing  Note: Prognosis: Good  Problem List: Decreased strength, Decreased range of motion, Decreased endurance, Impaired balance, Decreased mobility, Impaired sensation, Pain  Assessment: Patient was seen today per POC. Dec tolerance to treatment session 2* continued L knee buckling. Required S for supine to sit, minAx1 for sit to supine, sit<>stand, and toilet transfers. Continues to have significant LLE buckling upon standing requiring maxAx2 during static standing this session for improved safety. Unable to bear weight to LLE without buckling therefore unable to stand pivot. Replaced bed with BSC with assistance from RN and then replaced BSC with bed. Able to static stand ~1 minute with maxAx2 for stability 2* L knee buckling. Required inc cues throughout session for improved safety. Not appropriate for ambulation at this time. Minimal quad activation during quad sets. Educated pt on importance of quad sets for quad activation with good understanding. Reviewed HEP and provided handout. Will continue to see pt per POC as tolerated. Based on pt presentation and impaired function, pt would benefit from level III, (minimum resource intensity) at D/C pending progress with functional mobility. The patient's AM-PAC Basic Mobility Inpatient Short Form Raw Score is 14. A Raw score of less than or equal to 16 suggests the patient may benefit from discharge to  post-acute rehabilitation services. Please also refer to the recommendation of the Physical Therapist for safe discharge planning. Ns staff notified.  Barriers to Discharge: Inaccessible home environment, Other (Comment) (knee buckling)     Rehab Resource Intensity Level, PT: III (Minimum Resource Intensity) (pending progress with functional mobility)    See flowsheet documentation for full assessment.

## 2024-03-25 NOTE — PLAN OF CARE
Problem: PHYSICAL THERAPY ADULT  Goal: Performs mobility at highest level of function for planned discharge setting.  See evaluation for individualized goals.  Description: Treatment/Interventions: Functional transfer training, LE strengthening/ROM, Elevations, Therapeutic exercise, Endurance training, Patient/family training, Bed mobility, Gait training, Spoke to nursing, OT  Equipment Recommended: Walker (pt owns)       See flowsheet documentation for full assessment, interventions and recommendations.  Note: Prognosis: Good  Problem List: Decreased strength, Decreased endurance, Decreased range of motion, Impaired balance, Decreased mobility, Impaired sensation, Obesity, Pain  Assessment: Pt. 68 y.o.female presents for elective surgery. Past medical hx includes GERD, HTN, PONV, stress incontinence, R TKA 2018. Pt admitted for Primary osteoarthritis of left knee (M17.12). S/p L elective TKR POD #0. Pt referred to PT for functional mobility evaluation & D/C planning w/ orders of  activity beginning POD#0 . WBAT LLE. PTA, pt reports being I w/o AD. During evaluation, deficits included dec mobility, balance, ambulation. Required minAx1 for sit<>stand, toilet transfers and S for supine to sit. BP stable throughout all transfers. (See flowsheet). Significant bilateral knee buckling upon standing requiring maxAx1 for static standing balance. Performed 4 sit<>stands throughout session with continued bilateral knee buckling. Pt incontinent upon standing with saddle paresthesia. See treatment assessment for further functional mobility. Pt demonstrated dec endurance and tolerance to activity. Denies reports of dizziness or SOB t/o session. Pt was educated on fall precautions and reinforced w/ good understanding. Pt would benefit from continued PT to address deficits as defined above and maximize level of independence with functional mobility and safety. Based on pt presentation and impaired function, pt would benefit from  level III, (minimum resource intensity) at D/C. The patient's AM-PAC Basic Mobility Inpatient Short Form Raw Score is 14. A Raw score of less than or equal to 16 suggests the patient may benefit from discharge to post-acute rehabilitation services. Please also refer to the recommendation of the Physical Therapist for safe discharge planning. Nsg staff to continue to mobilized pt (OOB in chair for all meals & ambulate in room/unit) as tolerated to prevent further decline in function. Nsg notified. Co-eval performed to complete this PT evaluation for the pts best interest given pts medical complexity and functional level.  Barriers to Discharge: Inaccessible home environment     Rehab Resource Intensity Level, PT: III (Minimum Resource Intensity) pending progress with functional mobility     See flowsheet documentation for full assessment.

## 2024-03-25 NOTE — OP NOTE
OPERATIVE REPORT  PATIENT NAME: Tonia Avelar  : 1955  MRN: [id number]  Pt Location:  [facility] ROOM 05    Surgery Date: 3/25/2024    Surgeons and Role:     * Bruce Mayorga MD - Primary     * Sally Meyer PA-C - Assisting     Preop Diagnosis:  Primary osteoarthritis of left knee [M17.12]    Post-Op Diagnosis Codes:     * Primary osteoarthritis of left knee [M17.12]    Procedure(s):  Left - ARTHROPLASTY KNEE TOTAL W ROBOT    Specimens:  * No specimens in log *    Estimated Blood Loss:   25 mL    Drains:  Closed/Suction Drain Anterior;Left Knee Accordion 10 Fr. (Active)   Number of days: 0       [REMOVED] Urethral Catheter Non-latex 16 Fr. (Removed)   Number of days: 0       Anesthesia Type:   Spinal w/ Femoral Nerve Block     Operative Indications:  Primary osteoarthritis of left knee [M17.12]    Operative Findings:  Depuy attune   Femur-4N   Poly-5   Tibia-3   Patella=35    Complications:   None    Knee Technique: Suture (direct) Repair  Knee Approach: Medial Parapatellar    Procedure and Technique:  Following the induction medical level of spinal anesthesia, Villafuerte catheter sterilely introduced into this patient's bladder.  Antibiotics were administered.  Left thigh was then fitted with a thigh-high tourniquet.  The left lower extremity then underwent sterile prep and drape.  The left lower extremity was exsanguinated to gravity, and the tourniquet inflated to 300 mmHg.  A midline knee incision was carried the knee in flexion.  Full-thickness flaps were raised on access the extensor mechanism.  A medial arthrotomy was created to open up the knee joint.  2 half pins were placed in proximal tibia, 2 half pins were placed with distal femur.  In doing so, modules were created.  The arrays were then attached to the modules.  Checkpoints made the proximal tibia distal femur.  The knee was then registered.  The surgery was planned out on the computer, the plan was finalized.  The robot was docked.  The  first maneuver involve the distal femoral cut probably anterior posterior cuts.  The chamfer cuts completed the process.  The proximal tibia cut was made next.  Care was taken preserve the integrity medial collateral, lateral collateral, posterior structures during these maneuvers.  The box cut was then made for the posterior stabilized unit, and remnant medial and lateral meniscectomies and performed.  Trials were inserted, the knee was taken through range of motion, found to be capable full extension, good flexion, stable throughout.  The patella was then resurfaced while utilizing manufactures equipment, is found to be a size 35 mm button.  The trial components removed and the knee was prepared for insertion of cemented components.  The cemented tibia, cemented femur, trial poly-, cemented patella placed.  Excess cement was removed, and the knee was brought into extension.  The cement was allowed to cure.  The trial poly was taken out, the knee was packed off.  The tourniquet is deflated, and hemostasis was secured.  The insert polyethylene was then snapped into position.  The knee was taken their final range of motion, found to be capable full extension, good flexion, stable throughout, and excellent patella tracking.  Satisfied with the extent of surgery, the wounds were flushed with saline and closed.  A Betadine soak initiated.  A drain is placed deep brought via cephalad stab incision.  The arthrotomy was closed with number Vicryl suture.  The subcu tissues closed with 2-0 Vicryl suture.  The skin was then closed with staples.  Sterile dressings were applied.  She was then transferred to recovery room in stable condition with plans to include physical therapy weightbearing tolerance, she will require DVT prophylaxis with Lovenox.  Please note, there is no qualified orthopedic resident was available assist, the assistance of Davi huddleston PA-C was instrumental in the safe execution as patient surgery.  Start  the patient position, patient prepped draped, IntraOp assistance, wound closure, dressing application, patient transfers, all of these were performed under my direct supervision   I was present for the entire procedure.    Patient Disposition:  PACU             SIGNATURE: Bruce Mayorga MD  DATE: March 25, 2024  TIME: 8:42 AM

## 2024-03-25 NOTE — PLAN OF CARE
Problem: OCCUPATIONAL THERAPY ADULT  Goal: Performs self-care activities at highest level of function for planned discharge setting.  See evaluation for individualized goals.  Description: Treatment Interventions: ADL retraining, UE strengthening/ROM, Endurance training, Patient/family training, Compensatory technique education, Continued evaluation, Energy conservation, Activityengagement, Equipment evaluation/education, Functional transfer training          See flowsheet documentation for full assessment, interventions and recommendations.   Note: Limitation: Decreased ADL status, Decreased endurance, Decreased self-care trans, Decreased high-level ADLs  Prognosis: Good  Assessment: Pt is a 68 y.o. female seen for OT evaluation s/p adm to  [facility]  on 3/25/2024 w/ primary osteoarthritis of left knee. Comorbidities affecting pt’s functional performance include a significant PMH of GERD, dyslipidemia, HTN. Anxiety, PONV, hx of R TKA (2018). Pt with active OT orders and activity orders for Activity beginning POD #0. Pt lives in a bilevel home with 0 KANDI through garage with granddaughter and sister. Pt has 8+7 steps to 2nd floor.  At baseline, pt was independent with ADLs/IADLs. WBAT LLE. Hemovac. Pt completed supine to sit with supervision. BP supine: 140/75. Pt completed don/doff of underwear with Nba seated EOB. EOB BP: 146/74. Pt reports feeling weak. Pt completed sit to stand with Nba, MaxA for standing balance due to BLE knee buckling and incontinence. Pt sat back down on EOB. See additional treatment session focusing on functional transfers, bed mobility, patient education. Upon evaluation, pt currently requires Nba for UB ADLs, Nba for LB ADLs, Nba for toileting, Nba for bed mobility, unable to assess for functional mobility, and Nba for transfers 2* the following deficits impacting occupational performance: weakness, decreased strength , decreased balance, decreased activity tolerance,  increased pain, orthopedic restrictions, and knee buckling . These impairments, as well at pt’s personal factors of: KANDI home environment, difficulty performing ADLs, difficulty performing IADLs, difficulty performing transfers/mobility, WBS, fall risk , functional decline , and new use of AD for functional transfers/mobility limit pt’s ability to safely engage in all baseline areas of occupation. Based on the aforementioned OT evaluation, functional performance deficits, and assessments, pt has been identified as a high complexity evaluation. Pt to continue to benefit from continued acute OT services during hospital stay to address defined deficits and to maximize level of functional independence in the following Occupational Performance areas: eating, grooming, bathing/shower, toilet hygiene, dressing, health maintenance, functional mobility, community mobility, clothing management, cleaning, household maintenance, and job performance/volunteering. From OT standpoint, recommend minimum resource intensity (pending progress) upon D/C. OT will continue to follow pt 3-5x/wk BID.     Rehab Resource Intensity Level, OT: III (Minimum Resource Intensity) (pending progress)

## 2024-03-25 NOTE — PHYSICAL THERAPY NOTE
PT PROGRESS NOTE    Name: Tonia Avelar  AGE: 68 y.o.  MRN: [id number]  LENGTH OF STAY: 0     03/25/24 1801   PT Last Visit   PT Visit Date 03/25/24   Note Type   Note Type BID visit/treatment   Restrictions/Precautions   Weight Bearing Precautions Per Order Yes   LLE Weight Bearing Per Order WBAT   Other Precautions Bed Alarm;WBS;Multiple lines;Fall Risk;Pain  (hemovac)   General   Chart Reviewed Yes   Response to Previous Treatment Patient with no complaints from previous session.   Family/Caregiver Present No   Cognition   Overall Cognitive Status WFL   Arousal/Participation Alert;Cooperative   Attention Within functional limits   Orientation Level Oriented X4   Following Commands Follows one step commands without difficulty   Subjective   Subjective I need to use the toilet again   Bed Mobility   Supine to Sit 5  Supervision   Additional items HOB elevated;Bedrails;Increased time required;Verbal cues   Sit to Supine 4  Minimal assistance   Additional items Increased time required;Verbal cues;LE management   Transfers   Sit to Stand 4  Minimal assistance   Additional items Assist x 1;Bedrails;Increased time required;Verbal cues  (w/ RW)   Stand to Sit 4  Minimal assistance   Additional items Assist x 1;Bedrails;Increased time required;Verbal cues  (w/ RW)   Toilet transfer 4  Minimal assistance   Additional items Assist x 1;Armrests;Increased time required;Verbal cues;Commode  (w/ RW)   Additional Comments continues to have significant LLE buckling upon standing requiring maxAx2 this session for improved safety. Unable to bear weight to LLE without buckling therefore unable to stand pivot. Replaced bed with BSC with assistance from RN and then replaced BSC with bed. required inc cues throughout session for improved safety.   Ambulation/Elevation   Gait pattern Not appropriate   Balance   Static Sitting Normal   Dynamic Sitting Good   Static Standing Poor -  (w/ RW)   Endurance Deficit   Endurance Deficit  Yes   Endurance Deficit Description pain and weakness   Activity Tolerance   Activity Tolerance Other (Comment)  (knee buckling)   Medical Staff Made Aware OT Soha   Nurse Made Aware RN C   Exercises   Quad Sets Supine;20 reps;AROM;Left   Heelslides Supine;20 reps;AROM;Left   Hip Abduction Supine;20 reps;AROM;Left   Balance training  able to static stand ~1 minute with maxAx2 for stability 2* L knee buckling.   Assessment   Prognosis Good   Problem List Decreased strength;Decreased range of motion;Decreased endurance;Impaired balance;Decreased mobility;Impaired sensation;Pain   Assessment Patient was seen today per POC. Dec tolerance to treatment session 2* continued L knee buckling. Required S for supine to sit, minAx1 for sit to supine, sit<>stand, and toilet transfers. Continues to have significant LLE buckling upon standing requiring maxAx2 during static standing this session for improved safety. Unable to bear weight to LLE without buckling therefore unable to stand pivot. Replaced bed with BSC with assistance from RN and then replaced BSC with bed. Able to static stand ~1 minute with maxAx2 for stability 2* L knee buckling. Required inc cues throughout session for improved safety. Not appropriate for ambulation at this time. Minimal quad activation during quad sets. Educated pt on importance of quad sets for quad activation with good understanding. Reviewed HEP and provided handout. Will continue to see pt per POC as tolerated. Based on pt presentation and impaired function, pt would benefit from level III, (minimum resource intensity) at D/C pending progress with functional mobility. The patient's AM-PAC Basic Mobility Inpatient Short Form Raw Score is 14. A Raw score of less than or equal to 16 suggests the patient may benefit from discharge to post-acute rehabilitation services. Please also refer to the recommendation of the Physical Therapist for safe discharge planning. [facility] [place].   Barriers to  Discharge Inaccessible home environment;Other (Comment)  (knee buckling)   Goals   Patient Goals to be able to walk   PT Treatment Day 1   Plan   Treatment/Interventions Functional transfer training;LE strengthening/ROM;Elevations;Therapeutic exercise;Endurance training;Patient/family training;Bed mobility;Gait training;Spoke to nursing;OT   Progress Slow progress, decreased activity tolerance   PT Frequency Twice a day  (PRN)   Discharge Recommendation   Rehab Resource Intensity Level, PT III (Minimum Resource Intensity)  (pending progress with functional mobility)   AM-PAC Basic Mobility Inpatient   Turning in Flat Bed Without Bedrails 3   Lying on Back to Sitting on Edge of Flat Bed Without Bedrails 3   Moving Bed to Chair 3   Standing Up From Chair Using Arms 3   Walk in Room 1   Climb 3-5 Stairs With Railing 1   Basic Mobility Inpatient Raw Score 14   Basic Mobility Standardized Score 35.55   [facility] Highest Level Of Mobility   -HL Goal 4: Move to chair/commode   -HL Achieved 4: Move to chair/commode   Education   Education Provided Mobility training;Home exercise program;Assistive device   Patient Demonstrates acceptance/verbal understanding   End of Consult   Patient Position at End of Consult Supine;Bed/Chair alarm activated;All needs within reach     Subha Patino, PT

## 2024-03-25 NOTE — OCCUPATIONAL THERAPY NOTE
Occupational Therapy Progress Note     Patient Name: Tonia Avelar  Today's Date: 3/25/2024  Problem List  Active Problems:    Primary osteoarthritis of left knee        03/25/24 2035   OT Last Visit   OT Visit Date 03/25/24   Note Type   Note Type BID visit/treatment   Pain Assessment   Pain Assessment Tool FLACC   Pain Location/Orientation Orientation: Left;Location: Knee   Pain Rating: FLACC (Rest) - Face 0   Pain Rating: FLACC (Rest) - Legs 0   Pain Rating: FLACC (Rest) - Activity 0   Pain Rating: FLACC (Rest) - Cry 0   Pain Rating: FLACC (Rest) - Consolability 0   Score: FLACC (Rest) 0   Pain Rating: FLACC (Activity) - Face 0   Pain Rating: FLACC (Activity) - Legs 0   Pain Rating: FLACC (Activity) - Activity 0   Pain Rating: FLACC (Activity) - Cry 0   Pain Rating: FLACC (Activity) - Consolability 0   Score: FLACC (Activity) 0   Restrictions/Precautions   Weight Bearing Precautions Per Order Yes   LLE Weight Bearing Per Order WBAT   Braces or Orthoses Other (Comment)  (hemovac)   Other Precautions Bed Alarm;WBS;Multiple lines;Fall Risk;Pain   Lifestyle   Autonomy Independent with ADLs/IADLs, no AD use at baseline   Reciprocal Relationships Sister and granddaughter   Service to Others Part time employment for [employer] for [employer]   Intrinsic Gratification crafting, sewing   ADL   Where Assessed Edge of bed   LB Dressing Assistance 4  Minimal Assistance   LB Dressing Deficit Setup;Verbal cueing;Supervision/safety;Increased time to complete;Thread RLE into underwear;Thread LLE into underwear   LB Dressing Comments Pt completed LB dressing don of underwear seated supine in bed with HOB elevated. Pt pull over hips while laying in bed, able to do a bridge to complete.   Toileting Assistance  4  Minimal Assistance   Toileting Deficit Setup;Verbal cueing;Supervison/safety   Toileting Comments Pt completed toileting with Nba seated on bedside commode. Pt requiring verbal cues for hand placement and  safe technique with perineal hygiene.   Functional Standing Tolerance   Time ~1 min   Activity MaxAx2 for standing tolerance with RW, standing for OT to move commode under pt due to pt not able to functional ambulate to bathroom due to knee buckling and weakness   Comments RW   Bed Mobility   Supine to Sit 5  Supervision   Additional items Increased time required;Verbal cues;LE management   Sit to Supine 4  Minimal assistance   Additional items Assist x 1;Increased time required;Verbal cues;LE management   Additional Comments Pt greeted in supine, verbal cues needed for hand placement and LE management for safety   Transfers   Sit to Stand 4  Minimal assistance   Additional items Assist x 1;Bedrails;Increased time required;Verbal cues  (RW)   Stand to Sit 4  Minimal assistance   Additional items Bedrails;Increased time required;Verbal cues  (RW)   Toilet transfer 4  Minimal assistance   Additional items Assist x 1;Increased time required;Verbal cues;Commode;Other  (Nba to sit <> stand, MaxAx2 for standing balance)   Additional Comments significant bilateral knee buckling maxAx2 for standing tolerance.   Functional Mobility   Functional Mobility   (unable to assess functional mobility)   Toilet Transfers   Toilet Transfer From Bed   Toilet Transfer Type To and from   Toilet Transfer to Standard bedside commode   Toilet Transfer Technique   (Standing with RW, OT placed commode under pt due to inability to complete functional mobility due to knee buckling)   Toilet Transfers Minimal assistance   Toilet Transfers Comments Nba for toileting and maxA for standing tolerance   Cognition   Overall Cognitive Status WFL   Arousal/Participation Alert;Cooperative   Attention Within functional limits   Orientation Level Oriented X4   Memory Within functional limits   Following Commands Follows one step commands without difficulty   Comments Cooperative   Activity Tolerance   Activity Tolerance Patient tolerated treatment  well;Patient limited by fatigue   Medical Staff Made Aware PT JUSTIN Mascorro Pt seen for treatment with skilled Physical Therapy due to clinically unstable presentation, medical complexity, fall risk, functional balance, limited activity tolerance which is a decline from PLOF and may impact overall safety.   Assessment   Assessment Pt seen for OT treatment session focusing on ADLs/IADLs, functional mobility, functional standing tolerance, functional transfers, patient education, continued evaluation. Pt greeted supine in bed at start of session. Pt alert and cooperative throughout session. Pt with good sitting balance and poor - static standing balance. Pt limited by knee buckling and weakness. Pt completed supine to sit with supervision during treatment today. Pt completed sit to stand with minAx1 and MaxAx2 for standing tolerance. Pt stood with RW and maxAx2 from standing balance, RN moved bed, OT moved commode for patient to stand and sit on commode without functional mobility due to buckling. Pt completed toileting with Nba seated on bedside commode. Pt requiring verbal cues for hand placement and safe technique with perineal hygiene. Pt completed stand to sit with RW and minAx1. Pt completed sit to supine with minAx1 for LE management. In bed, Pt completed LB dressing don of underwear seated supine in bed with HOB elevated. Pt pull over hips while laying in bed, able to do a bridge to complete.     Pt ended session seated in bed with HOB elevated and alarm activated. Call bell and phone within reach. All needs met and pt reports no further questions at this time. Continue to recommend minimum resource intensity (pending progress) when medically cleared. OT will continue to follow pt on caseload.   Plan   Treatment Interventions ADL retraining;Functional transfer training;Endurance training;Equipment evaluation/education;Compensatory technique education;Continued evaluation;Energy conservation;Activityengagement    Goal Expiration Date 04/01/24   OT Treatment Day 2   OT Frequency 3-5x/wk  (BID prn)   Discharge Recommendation   Rehab Resource Intensity Level, OT III (Minimum Resource Intensity)  (pending progress)   Additional Comments  The patient's raw score on the AM-PAC Daily Activity Inpatient Short Form is 17. A raw score of less than 19 suggests the patient may benefit from discharge to post-acute rehabilitation services. Please refer to the recommendation of the Occupational Therapist for safe discharge planning.   AM-PAC Daily Activity Inpatient   Lower Body Dressing 2   Bathing 2   Toileting 2   Upper Body Dressing 4   Grooming 3   Eating 4   Daily Activity Raw Score 17   Daily Activity Standardized Score (Calc for Raw Score >=11) 37.26   AM-PAC Applied Cognition Inpatient   Following a Speech/Presentation 4   Understanding Ordinary Conversation 4   Taking Medications 4   Remembering Where Things Are Placed or Put Away 4   Remembering List of 4-5 Errands 4   Taking Care of Complicated Tasks 4   Applied Cognition Raw Score 24   Applied Cognition Standardized Score 62.21   End of Consult   Education Provided Yes   Patient Position at End of Consult Supine;All needs within reach;Bed/Chair alarm activated   Nurse Communication Nurse aware of consult   Soha Kaye OT

## 2024-03-25 NOTE — PLAN OF CARE
Problem: OCCUPATIONAL THERAPY ADULT  Goal: Performs self-care activities at highest level of function for planned discharge setting.  See evaluation for individualized goals.  Description: Treatment Interventions: ADL retraining, UE strengthening/ROM, Endurance training, Patient/family training, Compensatory technique education, Continued evaluation, Energy conservation, Activityengagement, Equipment evaluation/education, Functional transfer training          See flowsheet documentation for full assessment, interventions and recommendations.   3/25/2024 1850 by Soha Kaye OT  Outcome: Not Progressing  Note: Limitation: Decreased ADL status, Decreased endurance, Decreased self-care trans, Decreased high-level ADLs  Prognosis: Good  Assessment: Pt seen for OT treatment session focusing on ADLs/IADLs, functional mobility, functional standing tolerance, functional transfers, patient education, continued evaluation. Pt greeted supine in bed at start of session. Pt alert and cooperative throughout session. Pt with good sitting balance and poor - static standing balance. Pt limited by knee buckling and weakness. Pt completed supine to sit with supervision during treatment today. Pt completed sit to stand with minAx1 and MaxAx2 for standing tolerance. Pt stood with RW and maxAx2 from standing balance, RN moved bed, OT moved commode for patient to stand and sit on commode without functional mobility due to buckling. Pt completed toileting with Nba seated on bedside commode. Pt requiring verbal cues for hand placement and safe technique with perineal hygiene. Pt completed stand to sit with RW and minAx1. Pt completed sit to supine with minAx1 for LE management. In bed, Pt completed LB dressing don of underwear seated supine in bed with HOB elevated. Pt pull over hips while laying in bed, able to do a bridge to complete.     Pt ended session seated in bed with HOB elevated and alarm activated. Call bell and phone within  reach. All needs met and pt reports no further questions at this time. Continue to recommend minimum resource intensity (pending progress) when medically cleared. OT will continue to follow pt on caseload.     Rehab Resource Intensity Level, OT: III (Minimum Resource Intensity) (pending progress)       3/25/2024 1249 by Soha Kaye OT  Note: Limitation: Decreased ADL status, Decreased endurance, Decreased self-care trans, Decreased high-level ADLs  Prognosis: Good  Assessment: Pt is a 68 y.o. female seen for OT evaluation s/p adm to  [facility]  on 3/25/2024 w/ primary osteoarthritis of left knee. Comorbidities affecting pt’s functional performance include a significant PMH of GERD, dyslipidemia, HTN. Anxiety, PONV, hx of R TKA (2018). Pt with active OT orders and activity orders for Activity beginning POD #0. Pt lives in a bilevel home with 0 KANDI through garage with granddaughter and sister. Pt has 8+7 steps to 2nd floor.  At baseline, pt was independent with ADLs/IADLs. WBAT LLE. Hemovac. Pt completed supine to sit with supervision. BP supine: 140/75. Pt completed don/doff of underwear with Nba seated EOB. EOB BP: 146/74. Pt reports feeling weak. Pt completed sit to stand with Nba, MaxA for standing balance due to BLE knee buckling and incontinence. Pt sat back down on EOB. See additional treatment session focusing on functional transfers, bed mobility, patient education. Upon evaluation, pt currently requires Nba for UB ADLs, Nba for LB ADLs, Nba for toileting, Nba for bed mobility, unable to assess for functional mobility, and Nba for transfers 2* the following deficits impacting occupational performance: weakness, decreased strength , decreased balance, decreased activity tolerance, increased pain, orthopedic restrictions, and knee buckling . These impairments, as well at pt’s personal factors of: KANDI home environment, difficulty performing ADLs, difficulty performing IADLs, difficulty performing  transfers/mobility, WBS, fall risk , functional decline , and new use of AD for functional transfers/mobility limit pt’s ability to safely engage in all baseline areas of occupation. Based on the aforementioned OT evaluation, functional performance deficits, and assessments, pt has been identified as a high complexity evaluation. Pt to continue to benefit from continued acute OT services during hospital stay to address defined deficits and to maximize level of functional independence in the following Occupational Performance areas: eating, grooming, bathing/shower, toilet hygiene, dressing, health maintenance, functional mobility, community mobility, clothing management, cleaning, household maintenance, and job performance/volunteering. From OT standpoint, recommend minimum resource intensity (pending progress) upon D/C. OT will continue to follow pt 3-5x/wk BID.     Rehab Resource Intensity Level, OT: III (Minimum Resource Intensity) (pending progress)

## 2024-03-25 NOTE — PHYSICAL THERAPY NOTE
PT EVALUATION and TREATMENT NOTE    Pt. Name: Tonia Avelar  Pt. Age: 68 y.o.  MRN: [id number]  LENGTH OF STAY: 0    Patient Active Problem List   Diagnosis    Primary osteoarthritis of left knee    Chronic GERD    Dyslipidemia    Hypertension    History of right knee joint replacement    Palpitations    Pes anserinus bursitis of right knee    Prediabetes    Anxiety disorder    Chronic left-sided low back pain with left-sided sciatica    Rash    Pain of left hip    Pre-op examination       Admitting Diagnoses:   Primary osteoarthritis of left knee [M17.12]    Past Medical History:   Diagnosis Date    Anxiety     last assessed 8/24/15    Arthralgia of right knee     last assessed 14    Chronic interstitial cystitis     GERD (gastroesophageal reflux disease)     High cholesterol     History of palpitations     Hypertension     Hypokalemia     last assessed 3/7/13    Irritable bowel syndrome     Osteoarthritis     last assessed 12    Osteopenia     last assessed 3/21/13    PONV (postoperative nausea and vomiting)     Postmenopausal atrophic vaginitis     last assessed 3/21/13    Stress incontinence     Uterine leiomyoma     resolved        Past Surgical History:   Procedure Laterality Date    BREAST CYST ASPIRATION Left     benign    BREAST SURGERY      puncture aspiration of cyst, left, resolved     CARDIAC CATHETERIZATION       SECTION  1977    daughter    COLONOSCOPY  2006    1 polyp-hyperplastic by Dr. Nielsen    COLONOSCOPY      Focal active colitis-acute by Dr. Nielsen    EGD      Schatzki's ring dilated with 52 [place] Segura, biopsy negative for Beard's by Dr. Way    EGD      Irregular Z line and biopsy positive for Beard's/intestinal metaplasia by Dr. Way    EGD  2013    Short segment Beard's biopsy positive Intestinal metaplasia , No dysplasia, fundic gland polyp by Dr. Way    ESOPHAGOGASTRODUODENOSCOPY N/A 2016    Normal-fundic gland  polyp, biopsy of the EG junction normal by Dr. Way    KNEE ARTHROSCOPY  2011    CT ARTHRP KNE CONDYLE&PLATU MEDIAL&LAT COMPARTMENTS Right 02/28/2018    Procedure: ARTHROPLASTY KNEE TOTAL;  Surgeon: Bruce Mayorga MD;  Location: [facility];  Service: Orthopedics  resolved 2010 per [employer]    VAGINAL HYSTERECTOMY      fibroids, resolved 1997       Imaging Studies:  No orders to display        03/25/24 1143   PT Last Visit   PT Visit Date 03/25/24   Note Type   Note type Evaluation  (and treatment)   Pain Assessment   Pain Assessment Tool 0-10   Pain Score 1   Pain Location/Orientation Orientation: Left;Location: Knee   Restrictions/Precautions   Weight Bearing Precautions Per Order Yes   LLE Weight Bearing Per Order WBAT   Other Precautions Bed Alarm;WBS;Multiple lines;Fall Risk;Pain  (hemovac)   Home Living   Type of Home House  (Bi-level)   Home Layout Multi-level;Laundry in basement;Performs ADLs on one level;Able to live on main level with bedroom/bathroom  (0 KANDI through garage; 8+7 to main floor where bed and full bath are located)   Bathroom Shower/Tub Tub/shower unit   Bathroom Toilet Raised   Bathroom Equipment Commode   Home Equipment Walker   Prior Function   Level of [place] Independent with functional mobility;Independent with ADLs;Independent with IADLS  (w/o AD)   Lives With Family  (Sister and grand daughter)   Receives Help From Family   IADLs Independent with driving;Independent with meal prep;Independent with medication management   Falls in the last 6 months 0   Vocational Part time employment  (works two days a week)   Comments PTA, pt independent with ADLs, IADLs, and functional mobility without AD. (-) falls. (+) . (+) part time employment.   General   Family/Caregiver Present No   Cognition   Overall Cognitive Status WFL   Arousal/Participation Alert   Orientation Level Oriented X4   Following Commands Follows one step commands without difficulty   Comments cooperative    Subjective   Subjective I think I am peeing   RUE Assessment   RUE Assessment   (refer to OT)   LUE Assessment   LUE Assessment   (refer to OT)   RLE Assessment   RLE Assessment WFL  (4+/5 grossly)   LLE Assessment   LLE Assessment X   Strength LLE   L Hip Flexion 3/5   L Knee Flexion 3-/5   L Knee Extension 2+/5   L Ankle Dorsiflexion 4-/5   L Ankle Plantar Flexion 4-/5   Light Touch   RLE Light Touch Impaired   RLE Light Touch Comments saddle paresthesia   LLE Light Touch Impaired   LLE Light Touch Comments saddle paresthesia   Bed Mobility   Supine to Sit 5  Supervision   Additional items HOB elevated;Bedrails;Increased time required;Verbal cues   Sit to Supine 4  Minimal assistance   Additional items Assist x 1;Bedrails;Increased time required;Verbal cues;LE management   Additional Comments Pt greeted in supine. /75. BP /74.   Transfers   Sit to Stand 4  Minimal assistance   Additional items Assist x 1;Bedrails;Increased time required;Verbal cues  (w/ RW)   Stand to Sit 4  Minimal assistance   Additional items Assist x 1;Bedrails;Increased time required;Verbal cues  (w/ RW)   Toilet transfer 4  Minimal assistance   Additional items Assist x 1;Armrests;Increased time required;Verbal cues;Commode  (w/ RW)   Additional Comments Significant bilateral knee buckling upon standing requiring maxAx1 for static standing balance. Performed 4 sit<>stands throughout session with continued bilateral knee buckling. Pt incontinent upon standing. Unable to ambulate therefore bed was removed and replaced with BSC for toileting.   Ambulation/Elevation   Gait pattern Not appropriate   Ambulation/Elevation Additional Comments unable to stand without bilateral knee buckling requiring maxAx1 for standing therefore not appropriate for ambulation at this time   Balance   Static Sitting Normal   Dynamic Sitting Good   Static Standing Poor -  (w/ RW)   Dynamic Standing Poor -  (w/ RW)   Endurance Deficit   Endurance Deficit  Yes   Endurance Deficit Description pain, weakness   Activity Tolerance   Activity Tolerance Other (Comment)  (knee buckling)   Medical Staff Made Aware OT Soha   Nurse Made Aware RN C   Assessment   Prognosis Good   Problem List Decreased strength;Decreased endurance;Decreased range of motion;Impaired balance;Decreased mobility;Impaired sensation;Obesity;Pain   Assessment Pt. 68 y.o.female presents for elective surgery. Past medical hx includes GERD, HTN, PONV, stress incontinence, R TKA 2018. Pt admitted for Primary osteoarthritis of left knee (M17.12). S/p L elective TKR POD #0. Pt referred to PT for functional mobility evaluation & D/C planning w/ orders of  activity beginning POD#0 . WBAT LLE. PTA, pt reports being I w/o AD. During evaluation, deficits included dec mobility, balance, ambulation. Required minAx1 for sit<>stand, toilet transfers and S for supine to sit. BP stable throughout all transfers. (See flowsheet). Significant bilateral knee buckling upon standing requiring maxAx1 for static standing balance. Performed 4 sit<>stands throughout session with continued bilateral knee buckling. Pt incontinent upon standing with saddle paresthesia. See treatment assessment for further functional mobility. Pt demonstrated dec endurance and tolerance to activity. Denies reports of dizziness or SOB t/o session. Pt was educated on fall precautions and reinforced w/ good understanding. Pt would benefit from continued PT to address deficits as defined above and maximize level of independence with functional mobility and safety. Based on pt presentation and impaired function, pt would benefit from level III, (minimum resource intensity) at D/C. The patient's AM-PAC Basic Mobility Inpatient Short Form Raw Score is 14. A Raw score of less than or equal to 16 suggests the patient may benefit from discharge to post-acute rehabilitation services. Please also refer to the recommendation of the Physical Therapist for safe  discharge planning. Nsg staff to continue to mobilized pt (OOB in chair for all meals & ambulate in room/unit) as tolerated to prevent further decline in function. Nsg notified. Co-zelda performed to complete this PT evaluation for the pts best interest given pts medical complexity and functional level.   Barriers to Discharge Inaccessible home environment   Goals   Patient Goals to have all her sensation back   STG Expiration Date 04/01/24   Short Term Goal #1 1) Inc overall LE strength by 1/2 MMT grade to improve functional mobility; 2) Pt will demonstrate improved bed mobility with mod I to dec caregiver burden; 3) Pt will demonstrate improved transfers w/ S for inc safety; 4) Continue pre gait training with RW and progress to gait training with RW >20' with minAx1 when appropriate; 5) Improve general balance by 1 grade to inc safety; 6) PT for ongoing patient and caregiver education   PT Treatment Day 0   Plan   Treatment/Interventions Functional transfer training;LE strengthening/ROM;Elevations;Therapeutic exercise;Endurance training;Patient/family training;Bed mobility;Gait training;Spoke to nursing;OT   PT Frequency Twice a day  (PRN)   Discharge Recommendation   Rehab Resource Intensity Level, PT III (Minimum Resource Intensity) pending progress with functional mobility    Equipment Recommended Walker  (pt owns)   AM-PAC Basic Mobility Inpatient   Turning in Flat Bed Without Bedrails 3   Lying on Back to Sitting on Edge of Flat Bed Without Bedrails 3   Moving Bed to Chair 3   Standing Up From Chair Using Arms 3   Walk in Room 1   Climb 3-5 Stairs With Railing 1   Basic Mobility Inpatient Raw Score 14   Basic Mobility Standardized Score 35.55   [facility] Highest Level Of Mobility   -[facility] Goal 4: Move to chair/commode   -HLM Achieved 5: Stand (1 or more minutes)   Additional Treatment Session   Start Time 1130   End Time 1143   Treatment Assessment Following initial evaluation, pt able to tolerate further  functional mobility. Required minAx1 for sit<>stand from bed<>BSC and sit to supine. Heavy reliance on UE support during standing transfers. Pt required maxA1 for static standing while BSC/bed were replaced with RN assistance. Pt with limited weight bearing through LLE 2* knee buckling. Pt unable to ambulate at this time 2* bilateral knee buckling and safety. Pt with continued saddle paresthesia, minimal pain. RN notified. Educated pt on pain and dec sensation following surgery. Pt supine in bed at end of session with all needs within reach. /65.   Equipment Use RW, BSC   Additional Treatment Day 1   End of Consult   Patient Position at End of Consult Supine;Bed/Chair alarm activated;All needs within reach   End of Consult Comments BP end of session 127/65.     Hx/personal factors: co-morbidities, inaccessible home, mutliple lines, use of AD, pain, WB restrictions, fall risk, and obesity, coping styles, social background, past experience, behavior pattern  Examination: dec mobility, dec balance, dec endurance, dec amb, risk for falls, pain, assessed body system, balance, endurance, amb, D/C disposition & fall risk, WB restrictions, impairements in locomotion, musculoskeletal, balance, endurance, posture, coordination, assessed cognition, impairments in systems including multiple body structures involved; musculoskeletal (ROM, strength, posture, BMI), neuromuscular (balance,locomotion, gait, transfers, motor control and learning, sensation), joint integrity, integumentary (skin integrity, presence of scars or wounds), cardiopulmonary (vitals, edema); activity limitations (difficulties executing an action); participation restrictions (problems associated w involvement in life situations)  Clinical: unpredictable (ongoing medical status, risk for falls, POD #0, and pain mgt)  Complexity: high        Subha Patino, PT

## 2024-03-26 LAB
ANION GAP SERPL CALCULATED.3IONS-SCNC: 6 MMOL/L (ref 4–13)
BUN SERPL-MCNC: 16 MG/DL (ref 5–25)
CALCIUM SERPL-MCNC: 8.9 MG/DL (ref 8.4–10.2)
CHLORIDE SERPL-SCNC: 104 MMOL/L (ref 96–108)
CO2 SERPL-SCNC: 29 MMOL/L (ref 21–32)
CREAT SERPL-MCNC: 0.78 MG/DL (ref 0.6–1.3)
ERYTHROCYTE [DISTWIDTH] IN BLOOD BY AUTOMATED COUNT: 13.3 % (ref 11.6–15.1)
GFR SERPL CREATININE-BSD FRML MDRD: 77 ML/MIN/1.73SQ M
GLUCOSE SERPL-MCNC: 126 MG/DL (ref 65–140)
HCT VFR BLD AUTO: 33.4 % (ref 34.8–46.1)
HGB BLD-MCNC: 10.9 G/DL (ref 11.5–15.4)
MCH RBC QN AUTO: 29.2 PG (ref 26.8–34.3)
MCHC RBC AUTO-ENTMCNC: 32.6 G/DL (ref 31.4–37.4)
MCV RBC AUTO: 90 FL (ref 82–98)
PLATELET # BLD AUTO: 206 THOUSANDS/UL (ref 149–390)
PMV BLD AUTO: 10.8 FL (ref 8.9–12.7)
POTASSIUM SERPL-SCNC: 4.1 MMOL/L (ref 3.5–5.3)
RBC # BLD AUTO: 3.73 MILLION/UL (ref 3.81–5.12)
SODIUM SERPL-SCNC: 139 MMOL/L (ref 135–147)
WBC # BLD AUTO: 12.71 THOUSAND/UL (ref 4.31–10.16)

## 2024-03-26 PROCEDURE — 97116 GAIT TRAINING THERAPY: CPT | Performed by: PHYSICAL THERAPIST

## 2024-03-26 PROCEDURE — 97110 THERAPEUTIC EXERCISES: CPT | Performed by: PHYSICAL THERAPIST

## 2024-03-26 PROCEDURE — 85027 COMPLETE CBC AUTOMATED: CPT

## 2024-03-26 PROCEDURE — 99024 POSTOP FOLLOW-UP VISIT: CPT | Performed by: PHYSICIAN ASSISTANT

## 2024-03-26 PROCEDURE — 80048 BASIC METABOLIC PNL TOTAL CA: CPT

## 2024-03-26 PROCEDURE — 97530 THERAPEUTIC ACTIVITIES: CPT

## 2024-03-26 PROCEDURE — 97535 SELF CARE MNGMENT TRAINING: CPT

## 2024-03-26 PROCEDURE — 97530 THERAPEUTIC ACTIVITIES: CPT | Performed by: PHYSICAL THERAPIST

## 2024-03-26 RX ADMIN — ACETAMINOPHEN 325MG 650 MG: 325 TABLET ORAL at 14:55

## 2024-03-26 RX ADMIN — SODIUM CHLORIDE 500 ML: 0.9 INJECTION, SOLUTION INTRAVENOUS at 10:24

## 2024-03-26 RX ADMIN — METHOCARBAMOL TABLETS 500 MG: 500 TABLET, COATED ORAL at 12:29

## 2024-03-26 RX ADMIN — DOCUSATE SODIUM 100 MG: 100 CAPSULE, LIQUID FILLED ORAL at 18:26

## 2024-03-26 RX ADMIN — METHOCARBAMOL TABLETS 500 MG: 500 TABLET, COATED ORAL at 23:23

## 2024-03-26 RX ADMIN — OXYCODONE HYDROCHLORIDE 5 MG: 5 TABLET ORAL at 21:51

## 2024-03-26 RX ADMIN — PAROXETINE HYDROCHLORIDE 10 MG: 20 TABLET, FILM COATED ORAL at 18:26

## 2024-03-26 RX ADMIN — ACETAMINOPHEN 325MG 650 MG: 325 TABLET ORAL at 07:06

## 2024-03-26 RX ADMIN — OXYCODONE HYDROCHLORIDE 5 MG: 5 TABLET ORAL at 07:06

## 2024-03-26 RX ADMIN — ENOXAPARIN SODIUM 40 MG: 40 INJECTION SUBCUTANEOUS at 21:51

## 2024-03-26 RX ADMIN — GABAPENTIN 100 MG: 100 CAPSULE ORAL at 21:51

## 2024-03-26 RX ADMIN — PAROXETINE HYDROCHLORIDE 10 MG: 20 TABLET, FILM COATED ORAL at 10:17

## 2024-03-26 RX ADMIN — DOCUSATE SODIUM 100 MG: 100 CAPSULE, LIQUID FILLED ORAL at 10:17

## 2024-03-26 RX ADMIN — GABAPENTIN 100 MG: 100 CAPSULE ORAL at 05:49

## 2024-03-26 RX ADMIN — METHOCARBAMOL TABLETS 500 MG: 500 TABLET, COATED ORAL at 18:26

## 2024-03-26 RX ADMIN — GABAPENTIN 100 MG: 100 CAPSULE ORAL at 14:55

## 2024-03-26 RX ADMIN — METHOCARBAMOL TABLETS 500 MG: 500 TABLET, COATED ORAL at 05:49

## 2024-03-26 RX ADMIN — SODIUM CHLORIDE 1000 ML: 0.9 INJECTION, SOLUTION INTRAVENOUS at 16:42

## 2024-03-26 NOTE — PHYSICAL THERAPY NOTE
PT PROGRESS NOTE    Name: Tonia Avelar  AGE: 68 y.o.  MRN: [id number]  LENGTH OF STAY: 0     03/26/24 1009   PT Last Visit   PT Visit Date 03/26/24   Note Type   Note Type BID visit/treatment   Pain Assessment   Pain Assessment Tool 0-10   Pain Score 2   Pain Location/Orientation Orientation: Right;Location: Knee   Hospital Pain Intervention(s) Repositioned;Ambulation/increased activity;Elevated;Emotional support;Rest   Restrictions/Precautions   Weight Bearing Precautions Per Order Yes   LLE Weight Bearing Per Order WBAT   Other Precautions Chair Alarm;Bed Alarm;WBS;Fall Risk;Pain   General   Chart Reviewed Yes   Response to Previous Treatment Patient with no complaints from previous session.   Family/Caregiver Present No   Cognition   Overall Cognitive Status WFL   Arousal/Participation Alert;Cooperative   Attention Within functional limits   Orientation Level Oriented X4   Following Commands Follows one step commands without difficulty   Subjective   Subjective I am doing better today with less knee buckling   Bed Mobility   Supine to Sit Unable to assess   Sit to Supine Unable to assess   Additional Comments Pt greeted OOB in chair. /50.   Transfers   Sit to Stand 5  Supervision   Additional items Armrests;Increased time required;Verbal cues  (w/ RW)   Stand to Sit 5  Supervision   Additional items Armrests;Increased time required;Verbal cues  (w/ RW)   Toilet transfer 4  Minimal assistance   Additional items Assist x 1;Increased time required;Verbal cues;Standard toilet  (w/ RW and grab bar use)   Additional Comments BP in standing 111/54. Subjective report of inc dizziness following toileting, /64 with resolved symptoms following seated rest break.   Ambulation/Elevation   Gait pattern Improper Weight shift;L Knee [place]bron;Antalgic;Decreased foot clearance;Decreased L stance;Short stride;Step to;Excessively slow;Knees flexed   Gait Assistance 4  Minimal assist   Additional items Assist x  "1;Verbal cues;Tactile cues   Assistive Device Rolling walker   Distance 10'x1; 40'x1; 40'x1   Stair Management Assistance Not tested   Ambulation/Elevation Additional Comments held on stair navigation 2* continued L knee buckling throughout ambulation; inc cues throughout ambulation to inc L quad contraction to promote knee extension   Balance   Static Sitting Normal   Dynamic Sitting Good   Static Standing Fair -  (w/ RW)   Dynamic Standing Poor +  (w/ RW)   Ambulatory Poor +  (w/ RW)   Endurance Deficit   Endurance Deficit Yes   Endurance Deficit Description Following ambulation, assessed BP in chair. BP 97/42. Reclined pt in chair and performed ankle pumps, BP 93/49. RN notified. Reclined chair further and reassessed /48. RN at bedside.   Activity Tolerance   Activity Tolerance Patient tolerated treatment well;Treatment limited secondary to medical complications (Comment)  (hypotension)   Medical Staff Made Aware OT Soha   Nurse Made Aware RN C   Exercises   Balance training  able to tolerate standing at sink to wash hands with minAx1 ~2 minutes. no knee buckling demonstrated however subjective report of feeling \"fuzzy.\"   Assessment   Prognosis Good   Problem List Decreased strength;Decreased range of motion;Decreased endurance;Impaired balance;Decreased mobility;Pain   Assessment Patient was seen today per POC. Overall, pt demonstrated improved mobility and inc tolerance to activity. Pain 2/10 in L knee. Required S for sit<>stand, minAx1 for toilet transfers, and ambulation. Use of standard toilet and grab bar use. Able to tolerate standing at sink to wash hands with minAx1 ~2 minutes. no knee buckling demonstrated however subjective report of feeling \"fuzzy.\" Subjective report of inc dizziness following toileting, /64 with resolved symptoms following seated rest break. Pt able to ambulate 10' and 40'x2 with RW and minAx1. Seated rest breaks between all ambulation trials 2* inc fatigue. Continued L " knee buckling inconsistently during ambulation with step to gait. Inc cues throughout ambulation to inc L quad contraction to promote knee extension. Held on stair navigation 2* continued L knee buckling throughout ambulation.  Following ambulation, assessed BP in chair. BP 97/42. Reclined pt in chair and performed ankle pumps, BP 93/49. RN notified. Reclined chair further and reassessed /48. RN at bedside. Will continue to see pt per POC as tolerated. Based on pt presentation and impaired function, pt would benefit from level III, (minimum resource intensity) at D/C pending progress with stair navigation. The patient's AM-PAC Basic Mobility Inpatient Short Form Raw Score is 18. A Raw score of greater than 16 suggests the patient may benefit from discharge to home. Please also refer to the recommendation of the Physical Therapist for safe discharge planning.   Goals   Patient Goals to be able to go home safely   Plan   Treatment/Interventions Functional transfer training;LE strengthening/ROM;Elevations;Therapeutic exercise;Endurance training;Cognitive reorientation;Patient/family training;Bed mobility;Gait training;Spoke to nursing;OT   Progress Slow progress, decreased activity tolerance   PT Frequency Twice a day  (PRN)   Discharge Recommendation   Rehab Resource Intensity Level, PT III (Minimum Resource Intensity)  (pending progress with stair navigation)   AM-PAC Basic Mobility Inpatient   Turning in Flat Bed Without Bedrails 4   Lying on Back to Sitting on Edge of Flat Bed Without Bedrails 3   Moving Bed to Chair 3   Standing Up From Chair Using Arms 3   Walk in Room 3   Climb 3-5 Stairs With Railing 2   Basic Mobility Inpatient Raw Score 18   Basic Mobility Standardized Score 41.05   [facility] Highest Level Of Mobility   -HLM Goal 6: Walk 10 steps or more   -HLM Achieved 7: Walk 25 feet or more   Education   Education Provided Mobility training;Assistive device   Patient Demonstrates  acceptance/verbal understanding   End of Consult   Patient Position at End of Consult Bedside chair;Bed/Chair alarm activated;All needs within reach   End of Consult Comments Pt with RN at bedside.     Subha Patino, PT

## 2024-03-26 NOTE — PLAN OF CARE
"  Problem: PHYSICAL THERAPY ADULT  Goal: Performs mobility at highest level of function for planned discharge setting.  See evaluation for individualized goals.  Description: Treatment/Interventions: Functional transfer training, LE strengthening/ROM, Elevations, Therapeutic exercise, Endurance training, Patient/family training, Bed mobility, Gait training, Spoke to nursing, OT  Equipment Recommended: Walker (pt owns)       See flowsheet documentation for full assessment, interventions and recommendations.  Outcome: Progressing  Note: Prognosis: Good  Problem List: Decreased strength, Decreased range of motion, Decreased endurance, Impaired balance, Decreased mobility, Pain  Assessment: Patient was seen today per POC. Overall, pt demonstrated improved mobility and inc tolerance to activity. Pain 2/10 in L knee. Required S for sit<>stand, minAx1 for toilet transfers, and ambulation. Use of standard toilet and grab bar use. Able to tolerate standing at sink to wash hands with minAx1 ~2 minutes. no knee buckling demonstrated however subjective report of feeling \"fuzzy.\" Subjective report of inc dizziness following toileting, /64 with resolved symptoms following seated rest break. Pt able to ambulate 10' and 40'x2 with RW and minAx1. Seated rest breaks between all ambulation trials 2* inc fatigue. Continued L knee buckling inconsistently during ambulation with step to gait. Inc cues throughout ambulation to inc L quad contraction to promote knee extension. Held on stair navigation 2* continued L knee buckling throughout ambulation.  Following ambulation, assessed BP in chair. BP 97/42. Reclined pt in chair and performed ankle pumps, BP 93/49. RN notified. Reclined chair further and reassessed /48. RN at bedside. Will continue to see pt per POC as tolerated. Based on pt presentation and impaired function, pt would benefit from level III, (minimum resource intensity) at D/C pending progress with stair " navigation. The patient's AM-PAC Basic Mobility Inpatient Short Form Raw Score is 18. A Raw score of greater than 16 suggests the patient may benefit from discharge to home. Please also refer to the recommendation of the Physical Therapist for safe discharge planning.  Barriers to Discharge: Inaccessible home environment, Other (Comment) (knee buckling)     Rehab Resource Intensity Level, PT: III (Minimum Resource Intensity) (pending progress with stair navigation)    See flowsheet documentation for full assessment.

## 2024-03-26 NOTE — OCCUPATIONAL THERAPY NOTE
Occupational Therapy Progress Note     Patient Name: Tonia Avelar  Today's Date: 3/26/2024  Problem List  Principal Problem:    Primary osteoarthritis of left knee          03/26/24 0928   OT Last Visit   OT Visit Date 03/26/24   Note Type   Note Type Treatment   Pain Assessment   Pain Assessment Tool 0-10   Pain Score 2   Pain Location/Orientation Orientation: Left;Location: Knee   Hospital Pain Intervention(s) Repositioned;Ambulation/increased activity;Elevated;Emotional support;Rest   Restrictions/Precautions   Weight Bearing Precautions Per Order Yes   LLE Weight Bearing Per Order WBAT   Other Precautions Chair Alarm;WBS;Fall Risk;Pain   Lifestyle   Autonomy Independent with ADLs/IADLs, no AD use at baseline   Reciprocal Relationships Sister and granddaughter   Service to Others Part time employment for central [employer]   Intrinsic Gratification crafting, sewing   ADL   Where Assessed Chair   Grooming Deficit Supervision/safety;Increased time to complete;Wash/dry hands   Grooming Comments Pt completed washing hands at sink with RW for stability.   Toileting Assistance  4  Minimal Assistance   Toileting Deficit Setup;Verbal cueing;Supervison/safety;Increased time to complete;Grab bar use   Toileting Comments Pt completed toileting on standard toilet with Nba and verbal cues for safe technique.   Functional Standing Tolerance   Time ~1-2 min   Activity standing at sink with RW for stability to wash hands. Pt reported feeling slightly dizzy. Pt seated in chair BP: 112/64 HR 48.   Bed Mobility   Additional Comments Pt greeted in bedside chair at start of session.   Transfers   Sit to Stand 5  Supervision   Additional items Armrests;Increased time required;Verbal cues  (RW)   Stand to Sit 5  Supervision   Additional items Armrests;Increased time required;Verbal cues  (RW)   Toilet transfer 4  Minimal assistance   Additional items Assist x 1;Increased time required;Verbal cues;Standard  toilet;Other  (Pt completed toilet transfer with Nba for sit <> stand and use of RW and grab bars for stability.)   Additional Comments verbal cues for hand placement and safe technique throughout session. Pt continues to have some episodes of LLE knee buckling but has improved since yesterday.   Functional Mobility   Functional Mobility 4  Minimal assistance   Additional Comments minAx1 functional household distances with RW and verbal cues for proper technique   Additional items Rolling walker   Toilet Transfers   Toilet Transfer From Other (Comment)  (Chair)   Toilet Transfer Type To and from   Toilet Transfer to Standard toilet   Toilet Transfer Technique Ambulating   Toilet Transfers Minimal assistance   Toilet Transfers Comments Nba for toileting and toilet transfer with use of grab bars and RW for stability   Cognition   Overall Cognitive Status WFL   Arousal/Participation Alert;Cooperative   Attention Within functional limits   Orientation Level Oriented X4   Memory Within functional limits   Following Commands Follows one step commands without difficulty   Comments Cooperative and pleasant   Activity Tolerance   Activity Tolerance Patient tolerated treatment well;Patient limited by fatigue   Medical Staff Made Aware RN C, PT Subha, Pt seen for treatment with skilled Physical Therapy due to clinically unstable presentation, medical complexity, fall risk, functional balance, limited activity tolerance which is a decline from PLOF and may impact overall safety.   Assessment   Assessment Pt seen for OT treatment session focusing on ADLs/IADLs, functional mobility, functional standing tolerance, functional transfers, patient education, continued evaluation. Pt greeted in bedside chair at start of session. Pt alert and cooperative throughout session. Pt tolerated treatment well with some knee buckling. BP in chair at start of session 109/50. BP standin/54. Pt completed functional mobility from chair to  bathroom with RW for stability and Nba. Pt completed toileting with Nba and toilet transfer with Nba sit <> stand. Pt requiring use of grab bar and RW for stability during transfer. Pt able to doff/don underwear during toileting with Nba. Pt completed grooming (wash hands) at sink standing with RW for stability if needs. Pt reported feeling slightly dizzy. Pt seated in chair in bathroom BP: 112/64 HR 48. Pt completed functional mobility functional household distances with RW and Nba in hallway. Pt ended session seated in bedside chair. BP taken at end of session 97/42 (+) slight dizziness. Pt completed ankle pumps and reclined in chair, BP taken again: 93/49. Pt continued ankle pumps and recliner further in chair /48. Nursing notified. Pt reports 2/10 pain and some slight dizziness, resolving quickly during session.    Pt ended session seated in bedside chair at end of session. Call bell and phone within reach. All needs met and pt reports no further questions at this time. Continue to recommend minimum resource intensity (pending progress) when medically cleared. OT will continue to follow pt on caseload.   Plan   Treatment Interventions ADL retraining;Functional transfer training;Endurance training;Patient/family training;Equipment evaluation/education;Compensatory technique education;Continued evaluation;Energy conservation;Activityengagement   Goal Expiration Date 04/01/24   OT Treatment Day 3   OT Frequency 3-5x/wk  (BID prn)   Discharge Recommendation   Rehab Resource Intensity Level, OT III (Minimum Resource Intensity)  (pending progress)   Additional Comments  The patient's raw score on the -PAC Daily Activity Inpatient Short Form is 17. A raw score of less than 19 suggests the patient may benefit from discharge to post-acute rehabilitation services. Please refer to the recommendation of the Occupational Therapist for safe discharge planning.   -PAC Daily Activity Inpatient   Lower Body  Dressing 2   Bathing 2   Toileting 2   Upper Body Dressing 4   Grooming 3   Eating 4   Daily Activity Raw Score 17   Daily Activity Standardized Score (Calc for Raw Score >=11) 37.26   AM-PAC Applied Cognition Inpatient   Following a Speech/Presentation 4   Understanding Ordinary Conversation 4   Taking Medications 4   Remembering Where Things Are Placed or Put Away 4   Remembering List of 4-5 Errands 4   Taking Care of Complicated Tasks 4   Applied Cognition Raw Score 24   Applied Cognition Standardized Score 62.21   End of Consult   Education Provided Yes   Patient Position at End of Consult Bedside chair;Bed/Chair alarm activated;All needs within reach   Nurse Communication Nurse aware of consult   End of Consult Comments Pt in bedside chair with chair alarm activated at end of session. Call bell and phone within reach. All needs met and pt reports no further questions for OT at this time.   Soha Kaye, OT

## 2024-03-26 NOTE — PLAN OF CARE
Problem: PAIN - ADULT  Goal: Verbalizes/displays adequate comfort level or baseline comfort level  Description: Interventions:  - Encourage patient to monitor pain and request assistance  - Assess pain using appropriate pain scale  - Administer analgesics based on type and severity of pain and evaluate response  - Implement non-pharmacological measures as appropriate and evaluate response  - Consider cultural and social influences on pain and pain management  - Notify physician/advanced practitioner if interventions unsuccessful or patient reports new pain  Outcome: Progressing     Problem: INFECTION - ADULT  Goal: Absence or prevention of progression during hospitalization  Description: INTERVENTIONS:  - Assess and monitor for signs and symptoms of infection  - Monitor lab/diagnostic results  - Monitor all insertion sites, i.e. indwelling lines, tubes, and drains  - Monitor endotracheal if appropriate and nasal secretions for changes in amount and color  - [place] appropriate cooling/warming therapies per order  - Administer medications as ordered  - Instruct and encourage patient and family to use good hand hygiene technique  - Identify and instruct in appropriate isolation precautions for identified infection/condition  Outcome: Progressing  Goal: Absence of fever/infection during neutropenic period  Description: INTERVENTIONS:  - Monitor WBC    Outcome: Progressing     Problem: SAFETY ADULT  Goal: Patient will remain free of falls  Description: INTERVENTIONS:  - Educate patient/family on patient safety including physical limitations  - Instruct patient to call for assistance with activity   - Consult OT/PT to assist with strengthening/mobility   - Keep Call bell within reach  - Keep bed low and locked with side rails adjusted as appropriate  - Keep care items and personal belongings within reach  - Initiate and maintain comfort rounds  - Make Fall Risk Sign visible to staff  - Offer Toileting every 2 Hours,  in advance of need  - Initiate/Maintain bed/chair alarm  - Obtain necessary fall risk management equipment: walker, alarm on  - Apply yellow socks and bracelet for high fall risk patients  - Consider moving patient to room near nurses station  Outcome: Progressing  Goal: Maintain or return to baseline ADL function  Description: INTERVENTIONS:  -  Assess patient's ability to carry out ADLs; assess patient's baseline for ADL function and identify physical deficits which impact ability to perform ADLs (bathing, care of mouth/teeth, toileting, grooming, dressing, etc.)  - Assess/evaluate cause of self-care deficits   - Assess range of motion  - Assess patient's mobility; develop plan if impaired  - Assess patient's need for assistive devices and provide as appropriate  - Encourage maximum independence but intervene and supervise when necessary  - Involve family in performance of ADLs  - Assess for home care needs following discharge   - Consider OT consult to assist with ADL evaluation and planning for discharge  - Provide patient education as appropriate  Outcome: Progressing  Goal: Maintains/Returns to pre admission functional level  Description: INTERVENTIONS:  - Perform AM-PAC 6 Click Basic Mobility/ Daily Activity assessment daily.  - Set and communicate daily mobility goal to care team and patient/family/caregiver.   - Collaborate with rehabilitation services on mobility goals if consulted  - Perform Range of Motion 3 times a day.  - Reposition patient every 2 hours.  - Dangle patient 3 times a day  - Stand patient 3 times a day  - Ambulate patient 3 times a day  - Out of bed to chair 3 times a day   - Out of bed for meals 3 times a day  - Out of bed for toileting  - Record patient progress and toleration of activity level   Outcome: Progressing     Problem: DISCHARGE PLANNING  Goal: Discharge to home or other facility with appropriate resources  Description: INTERVENTIONS:  - Identify barriers to discharge  w/patient and caregiver  - Arrange for needed discharge resources and transportation as appropriate  - Identify discharge learning needs (meds, wound care, etc.)  - Arrange for interpretive services to assist at discharge as needed  - Refer to Case Management Department for coordinating discharge planning if the patient needs post-hospital services based on physician/advanced practitioner order or complex needs related to functional status, cognitive ability, or social support system  Outcome: Progressing     Problem: Knowledge Deficit  Goal: Patient/family/caregiver demonstrates understanding of disease process, treatment plan, medications, and discharge instructions  Description: Complete learning assessment and assess knowledge base.  Interventions:  - Provide teaching at level of understanding  - Provide teaching via preferred learning methods  Outcome: Progressing

## 2024-03-26 NOTE — PLAN OF CARE
Problem: PAIN - ADULT  Goal: Verbalizes/displays adequate comfort level or baseline comfort level  Description: Interventions:  - Encourage patient to monitor pain and request assistance  - Assess pain using appropriate pain scale  - Administer analgesics based on type and severity of pain and evaluate response  - Implement non-pharmacological measures as appropriate and evaluate response  - Consider cultural and social influences on pain and pain management  - Notify physician/advanced practitioner if interventions unsuccessful or patient reports new pain  Outcome: Progressing     Problem: INFECTION - ADULT  Goal: Absence or prevention of progression during hospitalization  Description: INTERVENTIONS:  - Assess and monitor for signs and symptoms of infection  - Monitor lab/diagnostic results  - Monitor all insertion sites, i.e. indwelling lines, tubes, and drains  - Monitor endotracheal if appropriate and nasal secretions for changes in amount and color  - [place] appropriate cooling/warming therapies per order  - Administer medications as ordered  - Instruct and encourage patient and family to use good hand hygiene technique  - Identify and instruct in appropriate isolation precautions for identified infection/condition  Outcome: Progressing  Goal: Absence of fever/infection during neutropenic period  Description: INTERVENTIONS:  - Monitor WBC    Outcome: Progressing     Problem: SAFETY ADULT  Goal: Patient will remain free of falls  Description: INTERVENTIONS:  - Educate patient/family on patient safety including physical limitations  - Instruct patient to call for assistance with activity   - Consult OT/PT to assist with strengthening/mobility   - Keep Call bell within reach  - Keep bed low and locked with side rails adjusted as appropriate  - Keep care items and personal belongings within reach  - Initiate and maintain comfort rounds  - Make Fall Risk Sign visible to staff  - Offer Toileting every 2 Hours,  in advance of need  - Initiate/Maintain bed/chair alarm  - Obtain necessary fall risk management equipment: rolling walker  - Apply yellow socks and bracelet for high fall risk patients  - Consider moving patient to room near nurses station  Outcome: Progressing  Goal: Maintain or return to baseline ADL function  Description: INTERVENTIONS:  -  Assess patient's ability to carry out ADLs; assess patient's baseline for ADL function and identify physical deficits which impact ability to perform ADLs (bathing, care of mouth/teeth, toileting, grooming, dressing, etc.)  - Assess/evaluate cause of self-care deficits   - Assess range of motion  - Assess patient's mobility; develop plan if impaired  - Assess patient's need for assistive devices and provide as appropriate  - Encourage maximum independence but intervene and supervise when necessary  - Involve family in performance of ADLs  - Assess for home care needs following discharge   - Consider OT consult to assist with ADL evaluation and planning for discharge  - Provide patient education as appropriate  Outcome: Progressing  Goal: Maintains/Returns to pre admission functional level  Description: INTERVENTIONS:  - Perform AM-PAC 6 Click Basic Mobility/ Daily Activity assessment daily.  - Set and communicate daily mobility goal to care team and patient/family/caregiver.   - Collaborate with rehabilitation services on mobility goals if consulted  - Perform Range of Motion 3 times a day.  - Reposition patient every 2 hours.  - Dangle patient 3 times a day  - Stand patient 3 times a day  - Ambulate patient 3 times a day  - Out of bed to chair 3 times a day   - Out of bed for meals 3 times a day  - Out of bed for toileting  - Record patient progress and toleration of activity level   Outcome: Progressing     Problem: DISCHARGE PLANNING  Goal: Discharge to home or other facility with appropriate resources  Description: INTERVENTIONS:  - Identify barriers to discharge  w/patient and caregiver  - Arrange for needed discharge resources and transportation as appropriate  - Identify discharge learning needs (meds, wound care, etc.)  - Arrange for interpretive services to assist at discharge as needed  - Refer to Case Management Department for coordinating discharge planning if the patient needs post-hospital services based on physician/advanced practitioner order or complex needs related to functional status, cognitive ability, or social support system  Outcome: Progressing     Problem: Knowledge Deficit  Goal: Patient/family/caregiver demonstrates understanding of disease process, treatment plan, medications, and discharge instructions  Description: Complete learning assessment and assess knowledge base.  Interventions:  - Provide teaching at level of understanding  - Provide teaching via preferred learning methods  Outcome: Progressing

## 2024-03-26 NOTE — PLAN OF CARE
Problem: PAIN - ADULT  Goal: Verbalizes/displays adequate comfort level or baseline comfort level  Description: Interventions:  - Encourage patient to monitor pain and request assistance  - Assess pain using appropriate pain scale  - Administer analgesics based on type and severity of pain and evaluate response  - Implement non-pharmacological measures as appropriate and evaluate response  - Consider cultural and social influences on pain and pain management  - Notify physician/advanced practitioner if interventions unsuccessful or patient reports new pain  Outcome: Progressing     Problem: INFECTION - ADULT  Goal: Absence or prevention of progression during hospitalization  Description: INTERVENTIONS:  - Assess and monitor for signs and symptoms of infection  - Monitor lab/diagnostic results  - Monitor all insertion sites, i.e. indwelling lines, tubes, and drains  - Monitor endotracheal if appropriate and nasal secretions for changes in amount and color  - [place] appropriate cooling/warming therapies per order  - Administer medications as ordered  - Instruct and encourage patient and family to use good hand hygiene technique  - Identify and instruct in appropriate isolation precautions for identified infection/condition  Outcome: Progressing  Goal: Absence of fever/infection during neutropenic period  Description: INTERVENTIONS:  - Monitor WBC    Outcome: Progressing     Problem: SAFETY ADULT  Goal: Patient will remain free of falls  Description: INTERVENTIONS:  - Educate patient/family on patient safety including physical limitations  - Instruct patient to call for assistance with activity   - Consult OT/PT to assist with strengthening/mobility   - Keep Call bell within reach  - Keep bed low and locked with side rails adjusted as appropriate  - Keep care items and personal belongings within reach  - Initiate and maintain comfort rounds  - Make Fall Risk Sign visible to staff  - Offer Toileting every 2 Hours,  in advance of need  - Initiate/Maintain bed/chair alarm  - Obtain necessary fall risk management equipment: walker, alarms on   - Apply yellow socks and bracelet for high fall risk patients  - Consider moving patient to room near nurses station  Outcome: Progressing  Goal: Maintain or return to baseline ADL function  Description: INTERVENTIONS:  -  Assess patient's ability to carry out ADLs; assess patient's baseline for ADL function and identify physical deficits which impact ability to perform ADLs (bathing, care of mouth/teeth, toileting, grooming, dressing, etc.)  - Assess/evaluate cause of self-care deficits   - Assess range of motion  - Assess patient's mobility; develop plan if impaired  - Assess patient's need for assistive devices and provide as appropriate  - Encourage maximum independence but intervene and supervise when necessary  - Involve family in performance of ADLs  - Assess for home care needs following discharge   - Consider OT consult to assist with ADL evaluation and planning for discharge  - Provide patient education as appropriate  Outcome: Progressing  Goal: Maintains/Returns to pre admission functional level  Description: INTERVENTIONS:  - Perform AM-PAC 6 Click Basic Mobility/ Daily Activity assessment daily.  - Set and communicate daily mobility goal to care team and patient/family/caregiver.   - Collaborate with rehabilitation services on mobility goals if consulted  - Perform Range of Motion 3 times a day.  - Reposition patient every 2 hours.  - Dangle patient 3 times a day  - Stand patient 3 times a day  - Ambulate patient 3 times a day  - Out of bed to chair 3 times a day   - Out of bed for meals 3 times a day  - Out of bed for toileting  - Record patient progress and toleration of activity level   Outcome: Progressing     Problem: DISCHARGE PLANNING  Goal: Discharge to home or other facility with appropriate resources  Description: INTERVENTIONS:  - Identify barriers to discharge  w/patient and caregiver  - Arrange for needed discharge resources and transportation as appropriate  - Identify discharge learning needs (meds, wound care, etc.)  - Arrange for interpretive services to assist at discharge as needed  - Refer to Case Management Department for coordinating discharge planning if the patient needs post-hospital services based on physician/advanced practitioner order or complex needs related to functional status, cognitive ability, or social support system  Outcome: Progressing     Problem: Knowledge Deficit  Goal: Patient/family/caregiver demonstrates understanding of disease process, treatment plan, medications, and discharge instructions  Description: Complete learning assessment and assess knowledge base.  Interventions:  - Provide teaching at level of understanding  - Provide teaching via preferred learning methods  Outcome: Progressing

## 2024-03-26 NOTE — PROGRESS NOTES
Orthopedics   Tonia Avelar 68 y.o. female MRN: [id number]  Unit/Bed#: WE 2 N -01      Subjective:  68 y.o.female post operative day 1 left total knee arthroplasty. Pt doing well. Pain controlled. Patient with asymptomatic hypotension today, received 1 L bolus and responded well.    Labs:  0   Lab Value Date/Time    HCT 33.4 (L) 03/26/2024 0506    HCT 43.2 02/29/2024 0713    HCT 46.0 01/30/2023 0900    HGB 10.9 (L) 03/26/2024 0506    HGB 13.4 02/29/2024 0713    HGB 14.2 01/30/2023 0900    INR 0.94 02/29/2024 0713    WBC 12.71 (H) 03/26/2024 0506    WBC 8.32 02/29/2024 0713    WBC 8.38 01/30/2023 0900       Meds:    Current Facility-Administered Medications:     acetaminophen (TYLENOL) tablet 650 mg, 650 mg, Oral, Q6H PRN, JAMEL Cazares-C, 650 mg at 03/26/24 1455    calcium carbonate (TUMS) chewable tablet 1,000 mg, 1,000 mg, Oral, Daily PRN, JAMEL Cazares-GEORGINA    docusate sodium (COLACE) capsule 100 mg, 100 mg, Oral, BID, Sally Meyer, PA-C, 100 mg at 03/26/24 1017    enoxaparin (LOVENOX) subcutaneous injection 40 mg, 40 mg, Subcutaneous, Daily, JAMEL Cazares-C, 40 mg at 03/25/24 1957    gabapentin (NEURONTIN) capsule 100 mg, 100 mg, Oral, Q8H, Sally Meyer PA-C, 100 mg at 03/26/24 1455    HYDROmorphone (DILAUDID) injection 0.5 mg, 0.5 mg, Intravenous, Q2H PRN, JAMEL Cazares-C    lactated ringers infusion, 125 mL/hr, Intravenous, Continuous, JAMEL Cazares-C, Stopped at 03/26/24 0644    methocarbamol (ROBAXIN) tablet 500 mg, 500 mg, Oral, Q6H NESHA, Sally Meyer PA-C, 500 mg at 03/26/24 1229    metoprolol succinate (TOPROL-XL) 24 hr tablet 50 mg, 50 mg, Oral, Daily, Sally Meyer PA-C    ondansetron (ZOFRAN) injection 4 mg, 4 mg, Intravenous, Q6H PRN, Sally Meyer PA-C    oxyCODONE (ROXICODONE) immediate release tablet 10 mg, 10 mg, Oral, Q4H PRN, Sally Meyer PA-C    oxyCODONE (ROXICODONE) IR tablet 5 mg, 5 mg, Oral, Q4H PRN, Sally Meyer PA-C, 5 mg at 03/26/24 0706    PARoxetine (PAXIL) tablet 10 mg,  10 mg, Oral, BID, Sally Meyer PA-C, 10 mg at 03/26/24 1017    povidone-iodine (BETADINE) 10 % 20 Application in sodium chloride 0.9 % 500 mL irrigation bottle, , Irrigation, Once, Bruce Mayorga MD    pravastatin (PRAVACHOL) tablet 80 mg, 80 mg, Oral, Daily With Dinner, Sally Meyer PA-C    Ins and Outs:  I/O last 24 hours:  In: 2840 [P.O.:1440; I.V.:1250; IV Piggyback:150]  Out: 3425 [Urine:3000; Drains:400; Blood:25]    Physical:  Vitals:    03/26/24 1742   BP: 121/57   Pulse: 63   Resp:    Temp:    SpO2: 97%     Gen: AAOx3, NAD  Skin: warm and dry  EENT: EOMI, hearing intact   Lungs: no audible wheeze or stridor  left lower extremity:  Dressings C/D/I  Toes warm and well perfused  HVx1 - in place and functioning    _*_*_*_*_*_*_*_*_*_*_*_*_*_*_*_*_*_*_*_*_*_*_*_*_*_*_*_*_*_*_*_*_*_*_*_*_*_*_*_*_*    Assessment: 68 y.o.female post operative day 1 left total knee arthroplasty.     Plan:  Weight Bearing as tolerated LLE  Up and out of bed with assist  DVT prophylaxis - lovenox  Drain: removed today without complication  Incentive spirometer  Analgesics  Discharge planning  PT/OT  Patient noted to have acute blood loss anemia due to a drop in Hbg of > 2.0g from preop levels, will monitor vital signs and resuscitate with IV fluids as needed    Taylor Duggan PA-C

## 2024-03-26 NOTE — PLAN OF CARE
Problem: OCCUPATIONAL THERAPY ADULT  Goal: Performs self-care activities at highest level of function for planned discharge setting.  See evaluation for individualized goals.  Description: Treatment Interventions: ADL retraining, UE strengthening/ROM, Endurance training, Patient/family training, Compensatory technique education, Continued evaluation, Energy conservation, Activityengagement, Equipment evaluation/education, Functional transfer training          See flowsheet documentation for full assessment, interventions and recommendations.   Outcome: Progressing  Note: Limitation: Decreased ADL status, Decreased endurance, Decreased self-care trans, Decreased high-level ADLs  Prognosis: Good  Assessment: Pt greeted bedside for OT treatment on 03/26/24 focusing on maximizing independence with ADLs. Pt is making good functional progress during OT session, however, continues to be limited by hypotension. Pt engages in ADLs within bathroom: min A with toileting and S with grooming. Pt completes functional transfers at S level and functional mobility with min AX1 (Pt continues to have some L knee buckling). Pt with with hypotension towards end of session (BP 75/44 seated) and returned supine in bed. Pt asymptomatic and RN present to assess Pt. Limitations that impact functional performance include decreased ADL status, decreased UE ROM, decreased UE strength, decreased safe judgement during ADLs, decreased endurance, decreased self care transfers, decreased high level ADLs, and pain. Occupational performance areas to address ADL retraining, functional transfer training, UE strengthening/ROM, endurance training, Pt/caregiver education, equipment evaluation/education, compensatory technique education, energy conservation, and activity engagement . Pt would benefit from continued skilled OT services while in hospital to maximize independence with ADLs. Will continue to follow Pt's goals and progress. Pt would benefit  from returning home with increased social support (Home pending progress) upon DC to maximize safety and independence with ADLs and functional tasks of choice.     Rehab Resource Intensity Level, OT: No post-acute rehabilitation needs (pending progress)

## 2024-03-26 NOTE — PLAN OF CARE
Problem: OCCUPATIONAL THERAPY ADULT  Goal: Performs self-care activities at highest level of function for planned discharge setting.  See evaluation for individualized goals.  Description: Treatment Interventions: ADL retraining, UE strengthening/ROM, Endurance training, Patient/family training, Compensatory technique education, Continued evaluation, Energy conservation, Activityengagement, Equipment evaluation/education, Functional transfer training          See flowsheet documentation for full assessment, interventions and recommendations.   Outcome: Progressing  Note: Limitation: Decreased ADL status, Decreased endurance, Decreased self-care trans, Decreased high-level ADLs  Prognosis: Good  Assessment: Pt seen for OT treatment session focusing on ADLs/IADLs, functional mobility, functional standing tolerance, functional transfers, patient education, continued evaluation. Pt greeted in bedside chair at start of session. Pt alert and cooperative throughout session. Pt tolerated treatment well with some knee buckling. BP in chair at start of session 109/50. BP standin/54. Pt completed functional mobility from chair to bathroom with RW for stability and Nba. Pt completed toileting with Nba and toilet transfer with Nba sit <> stand. Pt requiring use of grab bar and RW for stability during transfer. Pt able to doff/don underwear during toileting with Nba. Pt completed grooming (wash hands) at sink standing with RW for stability if needs. Pt reported feeling slightly dizzy. Pt seated in chair in bathroom BP: 112/64 HR 48. Pt completed functional mobility functional household distances with RW and Nba in hallway. Pt ended session seated in bedside chair. BP taken at end of session 97/42 (+) slight dizziness. Pt completed ankle pumps and reclined in chair, BP taken again: 93/49. Pt continued ankle pumps and recliner further in chair /48. Nursing notified. Pt reports 2/10 pain and some slight dizziness,  resolving quickly during session.    Pt ended session seated in bedside chair at end of session. Call bell and phone within reach. All needs met and pt reports no further questions at this time. Continue to recommend minimum resource intensity (pending progress) when medically cleared. OT will continue to follow pt on caseload.     Rehab Resource Intensity Level, OT: III (Minimum Resource Intensity) (pending progress)

## 2024-03-26 NOTE — OCCUPATIONAL THERAPY NOTE
"  Occupational Therapy Progress Note     Patient Name: Tonia Avelar  Today's Date: 3/26/2024  Problem List  Principal Problem:    Primary osteoarthritis of left knee            03/26/24 1630   OT Last Visit   OT Visit Date 03/26/24   Note Type   Note Type BID visit/treatment   Pain Assessment   Pain Assessment Tool 0-10   Pain Score 2   Pain Location/Orientation Orientation: Left;Location: Knee   Hospital Pain Intervention(s) Repositioned;Ambulation/increased activity   Restrictions/Precautions   Weight Bearing Precautions Per Order Yes   LLE Weight Bearing Per Order WBAT   Other Precautions Chair Alarm;Bed Alarm;Fall Risk;Pain;WBS   Lifestyle   Autonomy Independent with ADLs/IADLs, no AD use at baseline   Reciprocal Relationships Sister and granddaughter   Service to Others Part time employment for [place] [employer] for [employer]   Intrinsic Gratification crafting, sewing   ADL   Where Assessed Edge of bed   Grooming Assistance 5  Supervision/Setup   Grooming Deficit Setup;Wash/dry hands   LB Dressing Comments Pt educated on use of reacher for LB dressing and Pt reports interest, however, unable to attempt this session due to hypotension.   Toileting Assistance  4  Minimal Assistance   Toileting Deficit Setup;Increased time to complete;Supervison/safety;Verbal cueing;Steadying  (Std toilet with use of GB, however, at end of session BSC placed over toilet to simulate alberto environment)   Bed Mobility   Sit to Supine 5  Supervision   Additional items Increased time required;Bedrails   Additional Comments Pt greeted OOB in recliner chair.   Transfers   Sit to Stand 5  Supervision   Additional items Increased time required;Verbal cues   Stand to Sit 5  Supervision   Additional items Increased time required;Verbal cues   Toilet transfer 5  Supervision   Additional items Increased time required;Verbal cues;Standard toilet   Additional Comments with RW. BP in standing 100/47. Subjective report of inc feeling \"off\" " when on toilet. /64 with improved symptoms with seated rest.   Functional Mobility   Functional Mobility 4  Minimal assistance   Additional Comments (S)  Min Ax1 with RW with SBAx1 for chair follow. To/from bathroom and household distances. Pt continues to have LLE knee buckling.  Provided seated rest break in unit following 2nd ambulation trial, BP 75/44. Pt transported back to room and laid supine. BP 71/29. Placed in trendelenburg BP 88/46. Pt asymptomatic. Pt left in care of RN and PT to further assess PT.   Additional items Rolling walker   Cognition   Overall Cognitive Status WFL   Arousal/Participation Alert;Cooperative   Attention Within functional limits   Orientation Level Oriented X4   Memory Within functional limits   Following Commands Follows one step commands without difficulty   Comments Pt very pleasant and cooperative during OT session. Pt reports feeling nervous about her BP and RN present/aware.   Activity Tolerance   Activity Tolerance Patient tolerated treatment well;Patient limited by fatigue   Medical Staff Made Aware RN cleared/updated.   Assessment   Assessment Pt greeted bedside for OT treatment on 03/26/24 focusing on maximizing independence with ADLs. Pt is making good functional progress during OT session, however, continues to be limited by hypotension. Pt engages in ADLs within bathroom: min A with toileting and S with grooming. Pt completes functional transfers at S level and functional mobility with min AX1 (Pt continues to have some L knee buckling). Pt with with hypotension towards end of session (BP 75/44 seated) and returned supine in bed. Pt asymptomatic and RN present to assess Pt. Limitations that impact functional performance include decreased ADL status, decreased UE ROM, decreased UE strength, decreased safe judgement during ADLs, decreased endurance, decreased self care transfers, decreased high level ADLs, and pain. Occupational performance areas to address ADL  retraining, functional transfer training, UE strengthening/ROM, endurance training, Pt/caregiver education, equipment evaluation/education, compensatory technique education, energy conservation, and activity engagement . Pt would benefit from continued skilled OT services while in hospital to maximize independence with ADLs. Will continue to follow Pt's goals and progress. Pt would benefit from returning home with increased social support (Home pending progress) upon DC to maximize safety and independence with ADLs and functional tasks of choice.   Plan   Treatment Interventions ADL retraining;Functional transfer training;UE strengthening/ROM;Endurance training;Cognitive reorientation;Patient/family training;Equipment evaluation/education;Compensatory technique education;Energy conservation;Activityengagement   Goal Expiration Date 04/01/24   OT Treatment Day 3   OT Frequency 3-5x/wk  (BID PRN)   Discharge Recommendation   Rehab Resource Intensity Level, OT No post-acute rehabilitation needs  (pending progress)   Equipment Recommended Other (comment);Reacher ($)  (RW)   Additional Comments  The patient's raw score on the -PAC Daily Activity Inpatient Short Form is 17. A raw score of less than 19 suggests the patient may benefit from discharge to post-acute rehabilitation services. Please refer to the recommendation of the Occupational Therapist for safe discharge planning.   AM-PAC Daily Activity Inpatient   Lower Body Dressing 2   Bathing 2   Toileting 3   Upper Body Dressing 3   Grooming 3   Eating 4   Daily Activity Raw Score 17   Daily Activity Standardized Score (Calc for Raw Score >=11) 37.26   AM-PAC Applied Cognition Inpatient   Following a Speech/Presentation 4   Understanding Ordinary Conversation 4   Taking Medications 4   Remembering Where Things Are Placed or Put Away 4   Remembering List of 4-5 Errands 4   Taking Care of Complicated Tasks 4   Applied Cognition Raw Score 24   Applied Cognition  Standardized Score 62.21   End of Consult   Education Provided Yes   Patient Position at End of Consult Bed/Chair alarm activated;All needs within reach;Supine  (Pt continues with hypotension. Pt left in care of PT and RN at end of session.)   Nurse Communication Nurse aware of consult       Cha Hernandez MS, OTR/L

## 2024-03-26 NOTE — PROGRESS NOTES
Orthopedics   Tonia Avelar 68 y.o. female MRN: [id number]  Unit/Bed#: WE 2 N -01      Subjective:  68 y.o.female post operative day 1 left total knee arthroplasty. Pt doing well. Pain controlled. States her left knee is less pain than what she recalls post-op from her right TKA. Denies overnight events    Labs:  0   Lab Value Date/Time    HCT 33.4 (L) 03/26/2024 0506    HCT 43.2 02/29/2024 0713    HCT 46.0 01/30/2023 0900    HGB 10.9 (L) 03/26/2024 0506    HGB 13.4 02/29/2024 0713    HGB 14.2 01/30/2023 0900    INR 0.94 02/29/2024 0713    WBC 12.71 (H) 03/26/2024 0506    WBC 8.32 02/29/2024 0713    WBC 8.38 01/30/2023 0900       Meds:    Current Facility-Administered Medications:     acetaminophen (TYLENOL) tablet 650 mg, 650 mg, Oral, Q6H PRN, Sally Meyer PA-C, 650 mg at 03/26/24 0706    calcium carbonate (TUMS) chewable tablet 1,000 mg, 1,000 mg, Oral, Daily PRN, JAMEL Cazares-C    docusate sodium (COLACE) capsule 100 mg, 100 mg, Oral, BID, Sally Meyer, PA-C, 100 mg at 03/25/24 1726    enoxaparin (LOVENOX) subcutaneous injection 40 mg, 40 mg, Subcutaneous, Daily, Sally Meyer PA-C, 40 mg at 03/25/24 1957    gabapentin (NEURONTIN) capsule 100 mg, 100 mg, Oral, Q8H, Sally Meyer, PA-C, 100 mg at 03/26/24 0549    HYDROmorphone (DILAUDID) injection 0.5 mg, 0.5 mg, Intravenous, Q2H PRN, Sally Meyer PA-C    lactated ringers infusion, 125 mL/hr, Intravenous, Continuous, Sally Meyer PA-C, Stopped at 03/26/24 0644    methocarbamol (ROBAXIN) tablet 500 mg, 500 mg, Oral, Q6H NESHA, Sally Meyer PA-C, 500 mg at 03/26/24 0549    metoprolol succinate (TOPROL-XL) 24 hr tablet 50 mg, 50 mg, Oral, Daily, Sally Meyer PA-C    ondansetron (ZOFRAN) injection 4 mg, 4 mg, Intravenous, Q6H PRN, Sally Meyer PA-C    oxyCODONE (ROXICODONE) immediate release tablet 10 mg, 10 mg, Oral, Q4H PRN, Sally Meyer PA-C    oxyCODONE (ROXICODONE) IR tablet 5 mg, 5 mg, Oral, Q4H PRN, Sally Meyer PA-C, 5 mg at 03/26/24 0765     PARoxetine (PAXIL) tablet 10 mg, 10 mg, Oral, BID, Sally Meyer PA-C, 10 mg at 03/25/24 1726    povidone-iodine (BETADINE) 10 % 20 Application in sodium chloride 0.9 % 500 mL irrigation bottle, , Irrigation, Once, Bruce Mayorga MD    pravastatin (PRAVACHOL) tablet 80 mg, 80 mg, Oral, Daily With Dinner, Sally Meyer PA-C    Ins and Outs:  I/O last 24 hours:  In: 2120 [P.O.:720; I.V.:1250; IV Piggyback:150]  Out: 3425 [Urine:3000; Drains:400; Blood:25]    Physical:  Vitals:    03/26/24 0717   BP: 115/51   Pulse: (!) 51   Resp: 18   Temp: 97.8 °F (36.6 °C)   SpO2: 95%     Gen: AAOx3, NAD  Skin: warm and dry  EENT: EOMI, hearing intact   Lungs: no audible wheeze or stridor  left lower extremity:  Dressings C/D/I  Toes warm and well perfused  HVx1 - in place and functioning    _*_*_*_*_*_*_*_*_*_*_*_*_*_*_*_*_*_*_*_*_*_*_*_*_*_*_*_*_*_*_*_*_*_*_*_*_*_*_*_*_*    Assessment: 68 y.o.female post operative day 1 left total knee arthroplasty. Doing well    Plan:  Weight Bearing as tolerated LLE  Up and out of bed with assist  DVT prophylaxis - lovenox  Drain: removed today without complication  Incentive spirometer  Analgesics  Discharge - today pending PT  PT/OT  Patient noted to have acute blood loss anemia due to a drop in Hbg of > 2.0g from preop levels, will monitor vital signs and resuscitate with IV fluids as needed    Jody Hernandez PA-C

## 2024-03-26 NOTE — PLAN OF CARE
"  Problem: PHYSICAL THERAPY ADULT  Goal: Performs mobility at highest level of function for planned discharge setting.  See evaluation for individualized goals.  Description: Treatment/Interventions: Functional transfer training, LE strengthening/ROM, Elevations, Therapeutic exercise, Endurance training, Patient/family training, Bed mobility, Gait training, Spoke to nursing, OT  Equipment Recommended: Walker (pt owns)       See flowsheet documentation for full assessment, interventions and recommendations.  3/26/2024 1702 by Subha Patino PT  Outcome: Progressing  Note: Prognosis: Good  Problem List: Decreased strength, Decreased range of motion, Decreased endurance, Impaired balance, Decreased mobility, Pain  Assessment: Patient was seen today per POC. Overall, pt demonstrated improved mobility and inc tolerance to activity. Pain 2/10 in L knee. Inconsistent L knee buckling throughout session however improved since AM PT session. Required S for sit<>stand, sit to supine, toilet transfer, and ambulation. BP in chair, 101/48. BP in standing 100/47. Subjective report of inc feeling \"off\" when on toilet. /64 with improved symptoms with seated rest. Pt able to ambulate 10' and 50' with RW and minAx1. Antalgic step to gait with no gross LOB noted. Continued knee buckling however pt able to recover COM with minimal assistance. Provided seated rest break following 2nd ambulation trial, BP 75/44. Pt transported back to room and laid supine. BP 71/29. Placed in trendelenburg BP 88/46. Educated pt on ankle pumps and reassessed 77/43. Continue to reassess BP after ~2-3 minutes in trendelenburg position 96/39. Assessed BP in alternate /48. Pt with RN at bedside. Pt asymptomatic throughout all BP readings. Significant education provided throughout session on BP and pain with good understanding. Provided encouragement and comfort throughout session. Will continue to see pt per POC as tolerated. Based on pt " presentation and impaired function, pt would benefit from level III, (minimum resource intensity) at D/C. The patient's AM-PAC Basic Mobility Inpatient Short Form Raw Score is 18. A Raw score of greater than 16 suggests the patient may benefit from discharge to home. Please also refer to the recommendation of the Physical Therapist for safe discharge planning. Pt may benefit from a new RW at D/C 2* status of current RW and difficulty altering RW to appropriate height of pt.  Barriers to Discharge: Inaccessible home environment, Other (Comment) (knee buckling)     Rehab Resource Intensity Level, PT: III (Minimum Resource Intensity)    See flowsheet documentation for full assessment.

## 2024-03-26 NOTE — PHYSICAL THERAPY NOTE
"   PT PROGRESS NOTE    Name: Tonia Avelar  AGE: 68 y.o.  MRN: [id number]  LENGTH OF STAY: 0     03/26/24 1640   PT Last Visit   PT Visit Date 03/26/24   Note Type   Note Type BID visit/treatment   Pain Assessment   Pain Assessment Tool 0-10   Pain Score 2   Pain Location/Orientation Orientation: Left;Location: Knee   Hospital Pain Intervention(s) Repositioned;Ambulation/increased activity;Elevated;Emotional support;Rest   Restrictions/Precautions   Weight Bearing Precautions Per Order Yes   LLE Weight Bearing Per Order WBAT   Other Precautions Chair Alarm;Bed Alarm;WBS;Fall Risk;Pain   General   Chart Reviewed Yes   Response to Previous Treatment Patient with no complaints from previous session.   Family/Caregiver Present No   Cognition   Overall Cognitive Status WFL   Arousal/Participation Alert;Cooperative   Attention Within functional limits   Orientation Level Oriented X4   Following Commands Follows one step commands without difficulty   Subjective   Subjective Don't make me laugh, I will buckle.   Bed Mobility   Supine to Sit Unable to assess   Sit to Supine 5  Supervision   Additional items Increased time required;Verbal cues   Additional Comments Pt greeted seated in chair. BP in chair 101/48.   Transfers   Sit to Stand 5  Supervision   Additional items Armrests;Increased time required;Verbal cues  (w/ RW)   Stand to Sit 5  Supervision   Additional items Armrests;Increased time required;Verbal cues  (w/ RW)   Toilet transfer 5  Supervision   Additional items Increased time required;Verbal cues;Standard toilet  (w/ RW and grab bar use)   Additional Comments BP in standing 100/47. Subjective report of inc feeling \"off\" when on toilet. /64 with improved symptoms with seated rest.   Ambulation/Elevation   Gait pattern Improper Weight shift;Antalgic;L Knee Lebron;Decreased L stance;Short stride;Step to;Excessively slow;Knees flexed   Gait Assistance 4  Minimal assist   Additional items Assist x " "1;Verbal cues   Assistive Device Rolling walker   Distance 10'x1; 50'x1   Stair Management Assistance Not tested   Ambulation/Elevation Additional Comments continued knee buckling however dec knee buckling compared to AM session. Provided seated rest break in unit following 2nd ambulation trial, BP 75/44. Pt transported back to room and laid supine. BP 71/29. Placed in trendelenburg BP 88/46. Educated pt on ankle pumps and reassessed 77/43. Continue to reassess BP after ~2-3 minutes in trendelenburg position 96/39. Assessed BP in alternate /48. Pt with RN at bedside. Pt asymptomatic throughout all BP readings.   Balance   Static Sitting Normal   Dynamic Sitting Normal   Static Standing Good  (w/ RW)   Dynamic Standing Fair -  (w/ RW)   Ambulatory Fair -  (w/ RW)   Endurance Deficit   Endurance Deficit Yes   Activity Tolerance   Activity Tolerance Patient tolerated treatment well;Treatment limited secondary to medical complications (Comment)  (hypotension)   Medical Staff Made Aware MADHAVI Eubanks   Nurse Made Aware RN C   Exercises   Ankle Pumps Supine;20 reps;AROM;Bilateral   Assessment   Prognosis Good   Problem List Decreased strength;Decreased range of motion;Decreased endurance;Impaired balance;Decreased mobility;Pain   Assessment Patient was seen today per POC. Overall, pt demonstrated improved mobility and inc tolerance to activity. Pain 2/10 in L knee. Inconsistent L knee buckling throughout session however improved since AM PT session. Required S for sit<>stand, sit to supine, toilet transfer, and ambulation. BP in chair, 101/48. BP in standing 100/47. Subjective report of inc feeling \"off\" when on toilet. /64 with improved symptoms with seated rest. Pt able to ambulate 10' and 50' with RW and minAx1. Antalgic step to gait with no gross LOB noted. Continued knee buckling however pt able to recover COM with minimal assistance. Provided seated rest break following 2nd ambulation trial, BP 75/44. Pt " transported back to room and laid supine. BP 71/29. Placed in trendelenburg BP 88/46. Educated pt on ankle pumps and reassessed 77/43. Continue to reassess BP after ~2-3 minutes in trendelenburg position 96/39. Assessed BP in alternate /48. Pt with RN at bedside. Pt asymptomatic throughout all BP readings. Significant education provided throughout session on BP and pain with good understanding. Provided encouragement and comfort throughout session. Will continue to see pt per POC as tolerated. Based on pt presentation and impaired function, pt would benefit from level III, (minimum resource intensity) at D/C. The patient's AM-PAC Basic Mobility Inpatient Short Form Raw Score is 18. A Raw score of greater than 16 suggests the patient may benefit from discharge to home. Please also refer to the recommendation of the Physical Therapist for safe discharge planning. Pt may benefit from a new RW at D/C 2* status of current RW and difficulty altering RW to appropriate height of pt.   Goals   Patient Goals to figure out her BP   PT Treatment Day 2   Plan   Treatment/Interventions Functional transfer training;LE strengthening/ROM;Elevations;Therapeutic exercise;Endurance training;Patient/family training;Bed mobility;Gait training;Spoke to nursing;OT   Progress Slow progress, decreased activity tolerance   PT Frequency Twice a day  (PRN)   Discharge Recommendation   Rehab Resource Intensity Level, PT III (Minimum Resource Intensity)   Equipment Recommended Walker   Walker Package Recommended Wheeled walker   AM-PAC Basic Mobility Inpatient   Turning in Flat Bed Without Bedrails 4   Lying on Back to Sitting on Edge of Flat Bed Without Bedrails 3   Moving Bed to Chair 3   Standing Up From Chair Using Arms 3   Walk in Room 3   Climb 3-5 Stairs With Railing 2   Basic Mobility Inpatient Raw Score 18   Basic Mobility Standardized Score 41.05   [facility] Highest Level Of Mobility   [facility] Goal 6: Walk 10 steps or more    ROMAN Achieved 7: Walk 25 feet or more   Education   Education Provided Mobility training;Home exercise program;Assistive device   Patient Demonstrates acceptance/verbal understanding   End of Consult   Patient Position at End of Consult Supine;Bed/Chair alarm activated;All needs within reach   End of Consult Comments RN at bedside.     Subha Patino, PT

## 2024-03-27 VITALS
DIASTOLIC BLOOD PRESSURE: 58 MMHG | HEIGHT: 59 IN | HEART RATE: 77 BPM | WEIGHT: 143 LBS | SYSTOLIC BLOOD PRESSURE: 94 MMHG | BODY MASS INDEX: 28.83 KG/M2 | TEMPERATURE: 99.4 F | OXYGEN SATURATION: 94 % | RESPIRATION RATE: 18 BRPM

## 2024-03-27 LAB
DME PARACHUTE DELIVERY DATE REQUESTED: NORMAL
DME PARACHUTE ITEM DESCRIPTION: NORMAL
DME PARACHUTE ORDER STATUS: NORMAL
DME PARACHUTE SUPPLIER NAME: NORMAL
DME PARACHUTE SUPPLIER PHONE: NORMAL

## 2024-03-27 PROCEDURE — 97110 THERAPEUTIC EXERCISES: CPT | Performed by: PHYSICAL THERAPIST

## 2024-03-27 PROCEDURE — 99024 POSTOP FOLLOW-UP VISIT: CPT

## 2024-03-27 PROCEDURE — 97530 THERAPEUTIC ACTIVITIES: CPT | Performed by: PHYSICAL THERAPIST

## 2024-03-27 PROCEDURE — NC001 PR NO CHARGE

## 2024-03-27 PROCEDURE — 97535 SELF CARE MNGMENT TRAINING: CPT

## 2024-03-27 PROCEDURE — 97116 GAIT TRAINING THERAPY: CPT | Performed by: PHYSICAL THERAPIST

## 2024-03-27 RX ORDER — KETOROLAC TROMETHAMINE 30 MG/ML
15 INJECTION, SOLUTION INTRAMUSCULAR; INTRAVENOUS ONCE
Status: COMPLETED | OUTPATIENT
Start: 2024-03-27 | End: 2024-03-27

## 2024-03-27 RX ADMIN — DOCUSATE SODIUM 100 MG: 100 CAPSULE, LIQUID FILLED ORAL at 08:37

## 2024-03-27 RX ADMIN — GABAPENTIN 100 MG: 100 CAPSULE ORAL at 06:19

## 2024-03-27 RX ADMIN — PAROXETINE HYDROCHLORIDE 10 MG: 20 TABLET, FILM COATED ORAL at 08:37

## 2024-03-27 RX ADMIN — ACETAMINOPHEN 325MG 650 MG: 325 TABLET ORAL at 08:37

## 2024-03-27 RX ADMIN — METHOCARBAMOL TABLETS 500 MG: 500 TABLET, COATED ORAL at 06:19

## 2024-03-27 RX ADMIN — OXYCODONE HYDROCHLORIDE 2.5 MG: 5 TABLET ORAL at 11:07

## 2024-03-27 RX ADMIN — KETOROLAC TROMETHAMINE 15 MG: 30 INJECTION, SOLUTION INTRAMUSCULAR; INTRAVENOUS at 07:06

## 2024-03-27 RX ADMIN — METHOCARBAMOL TABLETS 500 MG: 500 TABLET, COATED ORAL at 11:46

## 2024-03-27 NOTE — PHYSICAL THERAPY NOTE
PT PROGRESS NOTE    Name: Tonia Avelar  AGE: 68 y.o.  MRN: [id number]  LENGTH OF STAY: 0     03/27/24 0918   PT Last Visit   PT Visit Date 03/27/24   Note Type   Note Type BID visit/treatment   Pain Assessment   Pain Assessment Tool 0-10   Pain Score 2   Hospital Pain Intervention(s) Cold applied;Repositioned;Ambulation/increased activity;Elevated;Emotional support;Rest   Restrictions/Precautions   Weight Bearing Precautions Per Order Yes   LLE Weight Bearing Per Order WBAT   Other Precautions Chair Alarm;Bed Alarm;WBS;Fall Risk;Pain   General   Chart Reviewed Yes   Response to Previous Treatment Patient with no complaints from previous session.   Family/Caregiver Present No   Cognition   Overall Cognitive Status WFL   Arousal/Participation Alert;Cooperative   Attention Within functional limits   Orientation Level Oriented X4   Following Commands Follows all commands and directions without difficulty   Comments Motivated to leave today   Subjective   Subjective I just feel fuzzy from the medications   Bed Mobility   Supine to Sit Unable to assess   Sit to Supine Unable to assess   Additional Comments Pt greeted OOB with RN at bedside. RN manual BP seated in chair 101/56 asymptomatic   Transfers   Sit to Stand 5  Supervision   Additional items Armrests;Verbal cues  (w/ RW)   Stand to Sit 5  Supervision   Additional items Armrests;Verbal cues  (w/ RW)   Car transfer   (Educated pt on proper car transfer and demonstrated transfer with good understanding. All questions addressed regarding car transfer.)   Additional Comments BP in standing 94/58, asymptomatic.   Ambulation/Elevation   Gait pattern Antalgic;Decreased foot clearance;Decreased L stance;Short stride;Step to   Gait Assistance 5  Supervision   Additional items Verbal cues   Assistive Device Rolling walker   Distance 50'x1; 60'x1   Stair Management Assistance 5  Supervision   Additional items Assist x 1;Verbal cues   Stair Management Technique Two  "rails;One rail L;Step to pattern;Foreward;Nonreciprocal  (1st trial B/L hand rails; 2nd trial L hand rail to simulate home setup)   Number of Stairs 5  (x2)   Ambulation/Elevation Additional Comments educated pt on proper stair navigation with good understanding and carryover. BP post 1st ambulation trial prior to stair navigation 122/53 during seated rest. BP post stairs seated rest 106/60.   Balance   Static Sitting Normal   Dynamic Sitting Normal   Static Standing Good  (w/ RW)   Dynamic Standing Fair +  (w/ RW)   Ambulatory Fair +  (w/ RW)   Activity Tolerance   Activity Tolerance Patient tolerated treatment well   Nurse Made Aware RN Renetta   Exercises   TKR   (Reviewed HEP and discussed handout. All questions and concerns addressed at this time. Educated pt on LLPS in long sit position to promote knee extension.)   Assessment   Prognosis Good   Problem List Decreased strength;Decreased range of motion;Decreased endurance;Impaired balance;Decreased mobility;Pain   Assessment Patient was seen today per POC. Overall, pt demonstrated improved mobility and inc tolerance to activity. Pain 2/10 in L knee. Subjective report of feeling \"fuzzy\" from the medication. Assessed vitals throughout session, vitals stable however soft but patient asymptomatic. See flowsheet for BP throughout session. Required S for sit<>stand, ambulation, and stair navigation. Pt able to ambulate 50' and 60' with RW and S in unit. No knee buckling throughout step to antalgic gait. Inc gait speed compared to previous sessions. Educated pt on proper stair navigation with good understanding and carryover. Pt able to perform nonreciprocal stair navigation of 5x2 steps both 6 and 4 in in height with S. Use of B/L hand rails during 1st stair trial and use of unilateral L railings during 2nd stair trial to simulate home setup. Educated pt on proper car transfer and demonstrated transfer with good understanding. All questions addressed regarding car " transfer. Reviewed HEP and discussed handout. All questions and concerns addressed at this time. Educated pt on LLPS in long sit position to promote knee extension. No reports of dizziness t/o session. Will continue to see pt per POC as tolerated. Dec PT frequency 2* pt progressing with functional mobility. Based on pt presentation and impaired function, pt would benefit from level III, (minimum resource intensity) at D/C. The patient's AM-PAC Basic Mobility Inpatient Short Form Raw Score is 22. A Raw score of greater than 16 suggests the patient may benefit from discharge to home. Please also refer to the recommendation of the Physical Therapist for safe discharge planning. From PT standpoint, pt cleared functionally for D/C when medically appropriate. Nsg staff to continue to mobilized pt (OOB in chair for all meals & ambulate in room/unit) as tolerated to prevent further decline in function. Nsg staff notified.   Barriers to Discharge None   Barriers to Discharge Comments From PT standpoint, pt cleared functionally for D/C when medically appropriate.   Goals   Patient Goals to go home today   PT Treatment Day 3   Plan   Treatment/Interventions Functional transfer training;LE strengthening/ROM;Elevations;Therapeutic exercise;Endurance training;Patient/family training;Bed mobility;Gait training;Spoke to nursing;Spoke to case management;OT   Progress Progressing toward goals   PT Frequency 5-7x/wk   Discharge Recommendation   Rehab Resource Intensity Level, PT III (Minimum Resource Intensity)   Equipment Recommended Walker   Walker Package Recommended Wheeled walker   AM-[facility] Basic Mobility Inpatient   Turning in Flat Bed Without Bedrails 4   Lying on Back to Sitting on Edge of Flat Bed Without Bedrails 4   Moving Bed to Chair 4   Standing Up From Chair Using Arms 4   Walk in Room 3   Climb 3-5 Stairs With Railing 3   Basic Mobility Inpatient Raw Score 22   Basic Mobility Standardized Score 47.4   [facility]  Highest Level Of Mobility   [facility] Goal 7: Walk 25 feet or more   [facility] Achieved 7: Walk 25 feet or more   Education   Education Provided Mobility training;Home exercise program;Assistive device   Patient Demonstrates acceptance/verbal understanding   End of Consult   Patient Position at End of Consult Bedside chair;All needs within reach   End of Consult Comments BP at end of session 107/46 asymptomatic.     Subha Patino, PT

## 2024-03-27 NOTE — PLAN OF CARE
Problem: PAIN - ADULT  Goal: Verbalizes/displays adequate comfort level or baseline comfort level  Description: Interventions:  - Encourage patient to monitor pain and request assistance  - Assess pain using appropriate pain scale  - Administer analgesics based on type and severity of pain and evaluate response  - Implement non-pharmacological measures as appropriate and evaluate response  - Consider cultural and social influences on pain and pain management  - Notify physician/advanced practitioner if interventions unsuccessful or patient reports new pain  3/27/2024 1202 by Renetta Courtney RN  Outcome: Adequate for Discharge  3/27/2024 0814 by Renetta Courtney RN  Outcome: Progressing     Problem: INFECTION - ADULT  Goal: Absence or prevention of progression during hospitalization  Description: INTERVENTIONS:  - Assess and monitor for signs and symptoms of infection  - Monitor lab/diagnostic results  - Monitor all insertion sites, i.e. indwelling lines, tubes, and drains  - Monitor endotracheal if appropriate and nasal secretions for changes in amount and color  - [place] appropriate cooling/warming therapies per order  - Administer medications as ordered  - Instruct and encourage patient and family to use good hand hygiene technique  - Identify and instruct in appropriate isolation precautions for identified infection/condition  Outcome: Adequate for Discharge  Goal: Absence of fever/infection during neutropenic period  Description: INTERVENTIONS:  - Monitor WBC    Outcome: Adequate for Discharge     Problem: SAFETY ADULT  Goal: Patient will remain free of falls  Description: INTERVENTIONS:  - Educate patient/family on patient safety including physical limitations  - Instruct patient to call for assistance with activity   - Consult OT/PT to assist with strengthening/mobility   - Keep Call bell within reach  - Keep bed low and locked with side rails adjusted as appropriate  - Keep care items and personal  belongings within reach  - Initiate and maintain comfort rounds  - Make Fall Risk Sign visible to staff  - Offer Toileting every 2 Hours, in advance of need  - Initiate/Maintain chairalarm  - Obtain necessary fall risk management equipment: walker  - Apply yellow socks and bracelet for high fall risk patients  - Consider moving patient to room near nurses station  Outcome: Adequate for Discharge  Goal: Maintain or return to baseline ADL function  Description: INTERVENTIONS:  -  Assess patient's ability to carry out ADLs; assess patient's baseline for ADL function and identify physical deficits which impact ability to perform ADLs (bathing, care of mouth/teeth, toileting, grooming, dressing, etc.)  - Assess/evaluate cause of self-care deficits   - Assess range of motion  - Assess patient's mobility; develop plan if impaired  - Assess patient's need for assistive devices and provide as appropriate  - Encourage maximum independence but intervene and supervise when necessary  - Involve family in performance of ADLs  - Assess for home care needs following discharge   - Consider OT consult to assist with ADL evaluation and planning for discharge  - Provide patient education as appropriate  Outcome: Adequate for Discharge  Goal: Maintains/Returns to pre admission functional level  Description: INTERVENTIONS:  - Perform AM-PAC 6 Click Basic Mobility/ Daily Activity assessment daily.  - Set and communicate daily mobility goal to care team and patient/family/caregiver.   - Collaborate with rehabilitation services on mobility goals if consulted  - Perform Range of Motion 3 times a day.  - Reposition patient every 2 hours.  - Dangle patient 3 times a day  - Stand patient 3 times a day  - Ambulate patient 3 times a day  - Out of bed to chair 3 times a day   - Out of bed for meals 3 times a day  - Out of bed for toileting  - Record patient progress and toleration of activity level   Outcome: Adequate for Discharge     Problem:  DISCHARGE PLANNING  Goal: Discharge to home or other facility with appropriate resources  Description: INTERVENTIONS:  - Identify barriers to discharge w/patient and caregiver  - Arrange for needed discharge resources and transportation as appropriate  - Identify discharge learning needs (meds, wound care, etc.)  - Arrange for interpretive services to assist at discharge as needed  - Refer to Case Management Department for coordinating discharge planning if the patient needs post-hospital services based on physician/advanced practitioner order or complex needs related to functional status, cognitive ability, or social support system  Outcome: Adequate for Discharge     Problem: Knowledge Deficit  Goal: Patient/family/caregiver demonstrates understanding of disease process, treatment plan, medications, and discharge instructions  Description: Complete learning assessment and assess knowledge base.  Interventions:  - Provide teaching at level of understanding  - Provide teaching via preferred learning methods  Outcome: Adequate for Discharge

## 2024-03-27 NOTE — PROGRESS NOTES
Progress Note - Orthopedics   Tonia Avelar 68 y.o. female MRN: [id number]  Unit/Bed#: WE 2 N -01      Assessment:  68 y.o.female s/p left  total knee arthroplasty  with Dr. Mayorga on 3/25. POD2      Plan:  Weight Bearing as tolerated LLE  Up and out of bed   DVT prophylaxis - lovenox  Will continue to monitor H&H for signs of ABLA  Analgesics  PT/OT  Dispo: Ok for discharge once cleared by PT/OT      Subjective:  68 y.o.female Seen and examined at bedside. Patient states she is having some moderate pain since her block wore off. Nursing at bedside providing pain medicine. She is eager to work with PT today and hopes to be discharged. Patient states no acute events or complaints overnight. Denies fevers, chills, CP, SOB, N/V, numbness or tingling.     Labs:  0   Lab Value Date/Time    HCT 33.4 (L) 03/26/2024 0506    HCT 43.2 02/29/2024 0713    HCT 46.0 01/30/2023 0900    HGB 10.9 (L) 03/26/2024 0506    HGB 13.4 02/29/2024 0713    HGB 14.2 01/30/2023 0900    INR 0.94 02/29/2024 0713    WBC 12.71 (H) 03/26/2024 0506    WBC 8.32 02/29/2024 0713    WBC 8.38 01/30/2023 0900       Meds:    Current Facility-Administered Medications:     acetaminophen (TYLENOL) tablet 650 mg, 650 mg, Oral, Q6H PRN, Sally Meyer PA-C, 650 mg at 03/26/24 1455    calcium carbonate (TUMS) chewable tablet 1,000 mg, 1,000 mg, Oral, Daily PRN, Sally Meyer PA-C    docusate sodium (COLACE) capsule 100 mg, 100 mg, Oral, BID, Sally Meyer PA-C, 100 mg at 03/26/24 1826    enoxaparin (LOVENOX) subcutaneous injection 40 mg, 40 mg, Subcutaneous, Daily, Sally Meyer PA-C, 40 mg at 03/26/24 2151    gabapentin (NEURONTIN) capsule 100 mg, 100 mg, Oral, Q8H, Sally Meyer PA-C, 100 mg at 03/27/24 0619    lactated ringers infusion, 125 mL/hr, Intravenous, Continuous, Sally Meyer PA-C, Stopped at 03/26/24 0644    methocarbamol (ROBAXIN) tablet 500 mg, 500 mg, Oral, Q6H NESHA, Sally Meyer PA-C, 500 mg at 03/27/24 0619    metoprolol succinate  "(TOPROL-XL) 24 hr tablet 50 mg, 50 mg, Oral, Daily, Sally Meyer PA-C    ondansetron (ZOFRAN) injection 4 mg, 4 mg, Intravenous, Q6H PRN, Sally Meyer PA-C    oxyCODONE (ROXICODONE) immediate release tablet 10 mg, 10 mg, Oral, Q4H PRN, Sally Meyer PA-C    oxyCODONE (ROXICODONE) IR tablet 5 mg, 5 mg, Oral, Q4H PRN, Sally Meyer PA-C, 5 mg at 03/26/24 2151    PARoxetine (PAXIL) tablet 10 mg, 10 mg, Oral, BID, Sally Meyer PA-C, 10 mg at 03/26/24 1826    povidone-iodine (BETADINE) 10 % 20 Application in sodium chloride 0.9 % 500 mL irrigation bottle, , Irrigation, Once, Bruce Mayorga MD    pravastatin (PRAVACHOL) tablet 80 mg, 80 mg, Oral, Daily With Dinner, Sally Meyer PA-C    Blood Culture:   No results found for: \"BLOODCX\"    Wound Culture:   No results found for: \"WOUNDCULT\"    Ins and Outs:  I/O last 24 hours:  In: 720 [P.O.:720]  Out: 600 [Urine:600]      Physical:  Vitals:    03/27/24 0652   BP: 109/50   Pulse: 63   Resp: 18   Temp: 99.4 °F (37.4 °C)   SpO2: 94%     Musculoskeletal: left Lower Extremity  Skin -  No erythema or ecchymosis. Compartments soft and compressible.   Dressing - c/d/I  TTP at incision site  Sensation intact to saphenous, sural, tibial, superficial peroneal nerve, and deep peroneal  Motor intact to +FHL/EHL, +ankle dorsi/plantar flexion  2+ DP pulse, symmetric bilaterally  Digits warm and well perfused  Capillary refill < 2 seconds      Robert Bennett PA-C      "

## 2024-03-27 NOTE — CASE MANAGEMENT
Case Management Progress Note    Patient name Tonia Avelar  Location 2 N /WE 2 N * MRN [id number]  : 1955 Date 3/27/2024       LOS (days): 0  Geometric Mean LOS (GMLOS) (days):   Days to GMLOS:        OBJECTIVE:        Current admission status: Outpatient Surgery  Preferred Pharmacy:   [facility] - [place] - [address]  [address]  [place] [address]  Phone: [phone] Fax: [phone]    [id number] - [place] - [address]  [address]  [place] [address]  Phone: [phone] Fax: [phone]    [facility] [place] - [address]  [facility],  [address],  First Floor [facility] [place] [address]  Phone: [phone] Fax: [phone]    Primary Care Provider: Renetta Concepcion PA-C    Primary Insurance: [facility] BUNDLED SERVICES INCLUDES PT  Secondary Insurance: CAPITAL    PROGRESS NOTE:    CM consulted by orthopedics s/p knee replacement. Per Ortho pre-op assessment/PT assessment, pt resides in a two story home with family and was independent with ADLs prior to admission.  Therapy recommending OP therapy. No CM needs identified at this time. CM will continue to follow.     CM consult received.  Patient needs a walker.  Walker ordered via [place] order approved.  Call to patient and discussed same.  Message to WE-Ortho team can give walker from consignment.

## 2024-03-27 NOTE — OCCUPATIONAL THERAPY NOTE
Occupational Therapy Progress Note     Patient Name: Tonia Avelar  Today's Date: 3/27/2024  Problem List  Principal Problem:    Primary osteoarthritis of left knee        03/27/24 1052   OT Last Visit   OT Visit Date 03/27/24   Note Type   Note Type Treatment   Pain Assessment   Pain Assessment Tool 0-10   Pain Score No Pain   Pain Location/Orientation Orientation: Left;Location: Knee   Restrictions/Precautions   Weight Bearing Precautions Per Order Yes   LLE Weight Bearing Per Order WBAT   Other Precautions WBS;Fall Risk;Pain   Lifestyle   Autonomy Independent with ADLs/IADLs, no AD use at baseline   Reciprocal Relationships Sister and granddaughter   Service to Others 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   LB Dressing Assistance 5  Supervision/Setup   LB Dressing Deficit Setup;Verbal cueing;Supervision/safety;Increased time to complete   LB Dressing Comments Pt completed LB dressing seated in chair with verbal cues for proper technique. Pt completed doff of underwear. Pt completed don of new underwear and pants, standing with RW for stability to pull over hips.   Toileting Assistance  5  Supervision/Setup   Toileting Deficit Setup;Verbal cueing;Supervison/safety;Grab bar use   Toileting Comments Pt completed toileting with supervision on commode over standard toilet. Pt requiring verbal cues for safe technique.   Functional Standing Tolerance   Time ~1 min   Activity standing tolerance with RW to pull pants and underwear over hips.   Comments RW   Bed Mobility   Supine to Sit Unable to assess   Sit to Supine Unable to assess   Additional Comments Pt greeted in bedside chair at start of session   Transfers   Sit to Stand 5  Supervision    Additional items Armrests;Increased time required;Verbal cues  (RW)   Stand to Sit 5  Supervision   Additional items Armrests;Increased time required;Verbal cues  (RW)   Toilet transfer 5  Supervision   Additional items Increased time required;Verbal cues;Standard toilet  (RW)   Additional Comments inc verbal cues for hand placement   Functional Mobility   Functional Mobility 5  Supervision   Additional Comments supervision with RW in room distances from chair to bathroom and back. Pt completed functional mobility without knee buckling   Additional items Rolling walker   Toilet Transfers   Toilet Transfer From Other (Comment)  (Chair)   Toilet Transfer Type To and from   Toilet Transfer to Standard bedside commode  (over toilet)   Toilet Transfer Technique Ambulating   Toilet Transfers Supervision   Toilet Transfers Comments supervision for toileting and toilet transfer   Cognition   Overall Cognitive Status WFL   Arousal/Participation Alert;Cooperative   Attention Within functional limits   Orientation Level Oriented X4   Memory Within functional limits   Following Commands Follows all commands and directions without difficulty   Comments motivated to go home   Activity Tolerance   Activity Tolerance Patient tolerated treatment well   Medical Staff Made Aware Renetta ANDERSON   Assessment   Assessment Pt seen for OT treatment session focusing on ADLs/IADLs, functional mobility, functional standing tolerance, functional transfers, patient education, continued evaluation. Pt greeted in bedside chair at start of session. Pt alert and cooperative throughout session. Pt with good sitting balance and fair dynamic standing balance. Pt tolerated treatment well. Pt completed sit to stand with RW from chair to toilet. Pt completed toileting with supervision on commode over standard toilet. Pt requiring verbal cues for safe technique. Pt completed functional mobility from toilet to chair with RW and supervision. Pt completed UB  dressing seated in chair with supervision. Pt completed LB dressing seated in chair with verbal cues for proper technique. Pt completed doff of underwear. Pt completed don of new underwear and pants, standing with RW for stability to pull over hips. Pt completed doff/don of socks and shoes with Nba for LLE due to inc pain during movement. Pt educated on LB dressing, bathing, LE management, pain management for independence at home. Pt reports no pain and no dizziness. Pt's vitals include: stable.     Pt ended session seated in bedside chair. Call bell and phone within reach. All needs met and pt reports no further questions at this time. Continue to recommend no post acute rehabilitation needs when medically cleared. OT will continue to follow pt on caseload.   Plan   Treatment Interventions ADL retraining;Functional transfer training;Endurance training;Patient/family training;Equipment evaluation/education;Compensatory technique education;Continued evaluation;Energy conservation;Activityengagement   Goal Expiration Date 04/01/24   OT Treatment Day 4   OT Frequency 3-5x/wk  (BID prn)   Discharge Recommendation   Rehab Resource Intensity Level, OT No post-acute rehabilitation needs   Additional Comments  The patient's raw score on the AM-PAC Daily Activity Inpatient Short Form is 21. A raw score of greater than or equal to 19 suggests the patient may benefit from discharge to home. Please refer to the recommendation of the Occupational Therapist for safe discharge planning.   AM-PAC Daily Activity Inpatient   Lower Body Dressing 3   Bathing 3   Toileting 3   Upper Body Dressing 4   Grooming 4   Eating 4   Daily Activity Raw Score 21   Daily Activity Standardized Score (Calc for Raw Score >=11) 44.27   AM-PAC Applied Cognition Inpatient   Following a Speech/Presentation 4   Understanding Ordinary Conversation 4   Taking Medications 4   Remembering Where Things Are Placed or Put Away 4   Remembering List of 4-5 Errands  4   Taking Care of Complicated Tasks 4   Applied Cognition Raw Score 24   Applied Cognition Standardized Score 62.21   End of Consult   Education Provided Yes   Patient Position at End of Consult Bedside chair;All needs within reach   Nurse Communication Nurse aware of consult   Soha Kaye OT

## 2024-03-27 NOTE — PLAN OF CARE
Problem: OCCUPATIONAL THERAPY ADULT  Goal: Performs self-care activities at highest level of function for planned discharge setting.  See evaluation for individualized goals.  Description: Treatment Interventions: ADL retraining, UE strengthening/ROM, Endurance training, Patient/family training, Compensatory technique education, Continued evaluation, Energy conservation, Activityengagement, Equipment evaluation/education, Functional transfer training          See flowsheet documentation for full assessment, interventions and recommendations.   Outcome: Progressing  Note: Limitation: Decreased ADL status, Decreased endurance, Decreased self-care trans, Decreased high-level ADLs  Prognosis: Good  Assessment: Pt seen for OT treatment session focusing on ADLs/IADLs, functional mobility, functional standing tolerance, functional transfers, patient education, continued evaluation. Pt greeted in bedside chair at start of session. Pt alert and cooperative throughout session. Pt with good sitting balance and fair dynamic standing balance. Pt tolerated treatment well. Pt completed sit to stand with RW from chair to toilet. Pt completed toileting with supervision on commode over standard toilet. Pt requiring verbal cues for safe technique. Pt completed functional mobility from toilet to chair with RW and supervision. Pt completed UB dressing seated in chair with supervision. Pt completed LB dressing seated in chair with verbal cues for proper technique. Pt completed doff of underwear. Pt completed don of new underwear and pants, standing with RW for stability to pull over hips. Pt completed doff/don of socks and shoes with Nba for LLE due to inc pain during movement. Pt educated on LB dressing, bathing, LE management, pain management for independence at home. Pt reports no pain and no dizziness. Pt's vitals include: stable.     Pt ended session seated in bedside chair. Call bell and phone within reach. All needs met and pt  No reports no further questions at this time. Continue to recommend no post acute rehabilitation needs when medically cleared. OT will continue to follow pt on caseload.     Rehab Resource Intensity Level, OT: No post-acute rehabilitation needs

## 2024-03-27 NOTE — PLAN OF CARE
"  Problem: PHYSICAL THERAPY ADULT  Goal: Performs mobility at highest level of function for planned discharge setting.  See evaluation for individualized goals.  Description: Treatment/Interventions: Functional transfer training, LE strengthening/ROM, Elevations, Therapeutic exercise, Endurance training, Patient/family training, Bed mobility, Gait training, Spoke to nursing, OT  Equipment Recommended: Walker       See flowsheet documentation for full assessment, interventions and recommendations.  Outcome: Adequate for Discharge  Note: Prognosis: Good  Problem List: Decreased strength, Decreased range of motion, Decreased endurance, Impaired balance, Decreased mobility, Pain  Assessment: Patient was seen today per POC. Overall, pt demonstrated improved mobility and inc tolerance to activity. Pain 2/10 in L knee. Subjective report of feeling \"fuzzy\" from the medication. Assessed vitals throughout session, vitals stable however soft but patient asymptomatic. See flowsheet for BP throughout session. Required S for sit<>stand, ambulation, and stair navigation. Pt able to ambulate 50' and 60' with RW and S in unit. No knee buckling throughout step to antalgic gait. Inc gait speed compared to previous sessions. Educated pt on proper stair navigation with good understanding and carryover. Pt able to perform nonreciprocal stair navigation of 5x2 steps both 6 and 4 in in height with S. Use of B/L hand rails during 1st stair trial and use of unilateral L railings during 2nd stair trial to simulate home setup. Educated pt on proper car transfer and demonstrated transfer with good understanding. All questions addressed regarding car transfer. Reviewed HEP and discussed handout. All questions and concerns addressed at this time. Educated pt on LLPS in long sit position to promote knee extension. No reports of dizziness t/o session. Will continue to see pt per POC as tolerated. Dec PT frequency 2* pt progressing with functional " mobility. Based on pt presentation and impaired function, pt would benefit from level III, (minimum resource intensity) at D/C. The patient's AM-PAC Basic Mobility Inpatient Short Form Raw Score is 22. A Raw score of greater than 16 suggests the patient may benefit from discharge to home. Please also refer to the recommendation of the Physical Therapist for safe discharge planning. From PT standpoint, pt cleared functionally for D/C when medically appropriate. Nsg staff to continue to mobilized pt (OOB in chair for all meals & ambulate in room/unit) as tolerated to prevent further decline in function. Nsg staff notified.  Barriers to Discharge: None  Barriers to Discharge Comments: From PT standpoint, pt cleared functionally for D/C when medically appropriate.  Rehab Resource Intensity Level, PT: III (Minimum Resource Intensity)    See flowsheet documentation for full assessment.

## 2024-03-28 ENCOUNTER — TELEPHONE (OUTPATIENT)
Dept: OBGYN CLINIC | Facility: HOSPITAL | Age: 69
End: 2024-03-28

## 2024-03-28 ENCOUNTER — OFFICE VISIT (OUTPATIENT)
Dept: PHYSICAL THERAPY | Age: 69
End: 2024-03-28
Payer: COMMERCIAL

## 2024-03-28 DIAGNOSIS — G89.29 CHRONIC PAIN OF LEFT KNEE: Primary | ICD-10-CM

## 2024-03-28 DIAGNOSIS — G89.18 ACUTE POST-OPERATIVE PAIN: ICD-10-CM

## 2024-03-28 DIAGNOSIS — M25.562 CHRONIC PAIN OF LEFT KNEE: Primary | ICD-10-CM

## 2024-03-28 PROCEDURE — 97161 PT EVAL LOW COMPLEX 20 MIN: CPT | Performed by: PHYSICAL THERAPIST

## 2024-03-28 PROCEDURE — 97110 THERAPEUTIC EXERCISES: CPT | Performed by: PHYSICAL THERAPIST

## 2024-03-28 NOTE — TELEPHONE ENCOUNTER
"Patient contacted for a postoperative follow up assessment. Patient reports doing  \"okay\" and pain is \"not too bad.\"  Patient states current pain level of a  3/10, and she is ambulating with the RW.  Patient reports swelling is \"the same\" and dressing is clean, dry and intact. Patient is icing the site regularly, and we discussed continuing to ice areas of pain and swelling. She has OP PT today.     We reviewed patients AVS medication list. Patient is taking Tylenol 1000mg every 8 hours, Robaxin 500mg TID, Gabapentin 100mg  TID, Oxycodone 5mg PRN, Lovenox daily, and I recommend she start an  OTC like Colace 100mg BID. Patient has not yet had a BM and discussed continuing the Colace until going regular.y       Pt states \"I feel fine.\"     Patient denies nausea, vomiting, abdominal pain, chest pain, shortness of breath, fever, dizziness and calf pain. Patient does not have any other questions or concerns at this time. Pt was encouraged to call with any questions, concerns or issues.    "

## 2024-03-28 NOTE — PROGRESS NOTES
PT Evaluation     Today's date: 3/28/2024  Patient name: Tonia Avelar  : 1955  MRN: [id number]  Referring provider: Bruce Mayorga,*  Dx:   Encounter Diagnosis     ICD-10-CM    1. Chronic pain of left knee  M25.562     G89.29       2. Acute post-operative pain  G89.18           Start Time: 1015  Stop Time: 1100  Total time in clinic (min): 45 minutes      Assessment  Assessment details: Pauline is a 68 y.o. female who presents with decreased ROM, strength and experiences pain secondary to left TKA. Pt is currently ambulating with RW. Due to impairments, patient has difficulty ambulating community distances, and navigating stairs. Pt shows no signs of DVT or infection at incision sight. Girth measurements will be taken next visit. Patient would benefit from skilled physical therapy to address the impairments, improve their level of function, and to improve their overall quality of life.  Impairments: abnormal or restricted ROM, abnormal movement, impaired physical strength, lacks appropriate home exercise program, pain with function and weight-bearing intolerance  Understanding of Dx/Px/POC: good   Prognosis: good   Goals  Short Term Goals: to be achieved by 4 weeks  1) Patient to be independent with basic HEP.  2) Decrease pain to 1/10 at its worst.  3) Increase Flexion ROM by 5-10 degrees   4) Increase LE strength by 1/2 MMT grade in all deficient planes.    Long Term Goals: to be achieved by discharge  1) FOTO equal to or greater than 58.  2) Ambulation to improve to maximal level of function  3) Stair negotiation will improve to reciprocal.  4) Sit to stand transfers will improve to maximal level of function    Plan  Patient would benefit from: skilled physical therapy  Planned modality interventions: cryotherapy  Planned therapy interventions: patient education, strengthening, stretching, therapeutic activities, therapeutic exercise, home exercise program, neuromuscular re-education,  functional ROM exercises and transfer training  Frequency: Twice a week for 8 weeks   Treatment plan discussed with: patient         Subjective Evaluation     History of Present Illness             Pain  Current pain ratin  At best pain ratin  At worst pain ratin  Quality: throbbing     Patient Goals  Patient goals for therapy: increased strength and decreased pain  Patient goal: Dancing, squatting            Objective      Active Range of Motion   Left Knee     Flexion: 80 degrees with pain  Extension: -3 degrees with pain     Passive Range of Motion         Left Knee   Flexion: 105 degrees with pain  Extension: 0 degrees with pain     Strength/Myotome Testing      Left Hip   Planes of Motion   Flexion: 4-  Extension: 4-  Abduction: 4-  Adduction: 4-  External rotation: 4-  Internal rotation: 4-        Left Knee      Flexion: 3+  Extension: 3+     Poor quad contraction     Functional Assessment           Comments  Unable to perform squat   TU sec with RW  SLS: Unable on RLE         Precautions: LTKA 3/25/24      Manuals 3/28            PROM JF                                                   Neuro Re-Ed             Swapna TKE wall ball             Standing hip abd                                                                              Ther Ex             Quad sets HEP            Heel slides             Mini squats             LAQ             Heel prop             Nu step or Recumben for ROM                                       Ther Activity                                       Gait Training                                       Modalities

## 2024-03-28 NOTE — DISCHARGE SUMMARY
Discharge Summary - Orthopedics   Tonia Avelar 68 y.o. female MRN: [id number]  Unit/Bed#:     Attending Physician: Tierra    Admitting diagnosis: Primary osteoarthritis of left knee [M17.12]    Discharge diagnosis: Primary osteoarthritis of left knee [M17.12]    Date of admission: 3/25/2024    Date of discharge: 3/27/24    Procedure: Left total knee arthroplasty     HPI: 68 y.o. female with a history of L knee osteoarthritis who has been seen by Dr. Mayorga in clinic.  Pt has failed previous non operative therapies and was scheduled for left TKA.  Prior to surgery the risks and benefits of surgery were explained and informed consent was obtained.    Hospital course: Pt was taken to the OR on 3/25/24.  Surgery went without complications and pt was discharged to the PACU in a stable condition and was transferred to the floor.  On discharge date pt was cleared by PT and the medicine team and determined to be safe for discharge.  Daily discussion was had with the patient, nursing staff, orthopaedic team, and family members if present.  All questions were answered to the patients satisifaction.      0   Lab Value Date/Time    HGB 10.9 (L) 03/26/2024 0506    HGB 13.4 02/29/2024 0713    HGB 14.2 01/30/2023 0900    HGB 14.2 11/03/2019 1809    HGB 9.7 (L) 03/02/2018 0521    HGB 10.0 (L) 03/01/2018 0546    HGB 14.3 02/12/2018 1105       Greater than 2 gram decrease in Hb qualifies for diagnosis of acute blood loss anemia. Vital signs remained stable and pt was resuscitated with IVF as needed.     Discharge Instructions:   WBAT LLE  Keep dressings clean and dry at all times   Complete DVT prophylaxis as prescribed   Physical therapy  Body mass index is 28.88 kg/m².   Follow-up as scheduled, otherwise call for appt.       Discharge Medications:  For the complete list of discharge medications, please refer to the patient's medication reconciliation.

## 2024-04-02 ENCOUNTER — OFFICE VISIT (OUTPATIENT)
Dept: OBGYN CLINIC | Facility: HOSPITAL | Age: 69
End: 2024-04-02

## 2024-04-02 ENCOUNTER — HOSPITAL ENCOUNTER (OUTPATIENT)
Dept: RADIOLOGY | Facility: HOSPITAL | Age: 69
Discharge: HOME | End: 2024-04-02
Attending: ORTHOPAEDIC SURGERY
Payer: COMMERCIAL

## 2024-04-02 VITALS
HEART RATE: 80 BPM | BODY MASS INDEX: 28.88 KG/M2 | HEIGHT: 59 IN | DIASTOLIC BLOOD PRESSURE: 72 MMHG | SYSTOLIC BLOOD PRESSURE: 122 MMHG

## 2024-04-02 DIAGNOSIS — Z47.1 AFTERCARE FOLLOWING LEFT KNEE JOINT REPLACEMENT SURGERY: ICD-10-CM

## 2024-04-02 DIAGNOSIS — Z47.1 AFTERCARE FOLLOWING LEFT KNEE JOINT REPLACEMENT SURGERY: Primary | ICD-10-CM

## 2024-04-02 DIAGNOSIS — M17.12 PRIMARY OSTEOARTHRITIS OF LEFT KNEE: ICD-10-CM

## 2024-04-02 DIAGNOSIS — Z96.652 AFTERCARE FOLLOWING LEFT KNEE JOINT REPLACEMENT SURGERY: Primary | ICD-10-CM

## 2024-04-02 DIAGNOSIS — Z96.652 AFTERCARE FOLLOWING LEFT KNEE JOINT REPLACEMENT SURGERY: ICD-10-CM

## 2024-04-02 PROCEDURE — 73560 X-RAY EXAM OF KNEE 1 OR 2: CPT

## 2024-04-02 PROCEDURE — 99024 POSTOP FOLLOW-UP VISIT: CPT | Performed by: ORTHOPAEDIC SURGERY

## 2024-04-02 RX ORDER — OXYCODONE HYDROCHLORIDE 5 MG/1
5 TABLET ORAL EVERY 6 HOURS PRN
Qty: 20 TABLET | Refills: 0 | Status: SHIPPED | OUTPATIENT
Start: 2024-04-02 | End: 2024-04-09

## 2024-04-02 NOTE — PROGRESS NOTES
Assessment:  1. Aftercare following left knee joint replacement surgery            Plan:  one week s/sp left TKA with robot, 3/25/2024  The patient is doing well and should continue daily Lovenox, pain medications as needed and current physical therapy regimen.  The patient can shower letting soapy water over incision, yet should not to submerge the incision, and then pat dry.    Oxycodone refilled  Patient will have excision of mass of lumbar spine and antibiotics are not required unless ordered by overseeing physician  The patient should follow up in one week      To do next visit:  Return in about 1 week (around 4/9/2024).    The above stated was discussed in layman's terms and the patient expressed understanding.  All questions were answered to the patient's satisfaction.       Scribe Attestation      I,:  Barney Chaney am acting as a scribe while in the presence of the attending physician.:       I,:  Bruce Mayorga MD personally performed the services described in this documentation    as scribed in my presence.:               Subjective:   Tonia Avelar is a 68 y.o. female who presents one week s/sp left TKA with robot, 3/25/2024.  The patient is doing well.  Today she complains of generalized left knee pain.  She does participate in physical therapy.  She does use Lovenox daily.  She does use oxycodone, tylenol, methocarbamol and gabapentin for pain control.  She denies fever, chills or shortness of breath.        Review of systems negative unless otherwise specified in HPI    Past Medical History:   Diagnosis Date    Anxiety     last assessed 8/24/15    Arthralgia of right knee     last assessed 5/27/14    Chronic interstitial cystitis     GERD (gastroesophageal reflux disease)     High cholesterol     History of palpitations     Hypertension     Hypokalemia     last assessed 3/7/13    Irritable bowel syndrome     Osteoarthritis     last assessed 6/13/12    Osteopenia     last assessed 3/21/13     PONV (postoperative nausea and vomiting)     Postmenopausal atrophic vaginitis     last assessed 3/21/13    Stress incontinence     Uterine leiomyoma     resolved        Past Surgical History:   Procedure Laterality Date    BREAST CYST ASPIRATION Left     benign    BREAST SURGERY      puncture aspiration of cyst, left, resolved     CARDIAC CATHETERIZATION       SECTION  1977    daughter    COLONOSCOPY  2006    1 polyp-hyperplastic by Dr. Nielsen    COLONOSCOPY      Focal active colitis-acute by Dr. Nielsen    EGD      Schatzki's ring dilated with 52 [place]an Segura, biopsy negative for Beard's by Dr. Way    EGD      Irregular Z line and biopsy positive for Beard's/intestinal metaplasia by Dr. Way    EGD      Short segment Beard's biopsy positive Intestinal metaplasia , No dysplasia, fundic gland polyp by Dr. Way    ESOPHAGOGASTRODUODENOSCOPY N/A 2016    Normal-fundic gland polyp, biopsy of the EG junction normal by Dr. Way    KNEE ARTHROSCOPY      VT ARTHRP KNE CONDYLE&PLATU MEDIAL&LAT COMPARTMENTS Right 2018    Procedure: ARTHROPLASTY KNEE TOTAL;  Surgeon: Bruce Mayorga MD;  Location: [facility];  Service: Orthopedics  resolved  per [employer]    VT ARTHRP KNE CONDYLE&PLATU MEDIAL&LAT COMPARTMENTS Left 3/25/2024    Procedure: ARTHROPLASTY KNEE TOTAL W ROBOT;  Surgeon: Bruce Mayorga MD;  Location: [facility];  Service: Orthopedics    VAGINAL HYSTERECTOMY      fibroids, resolved        Family History   Problem Relation Age of Onset    Cancer Mother     Hypertension Mother     Heart attack Father         MI    Hypertension Father     Heart disease Father         coronary artery disease    No Known Problems Sister     No Known Problems Sister     No Known Problems Daughter     Heart attack Maternal Grandmother     Stomach cancer Maternal Grandfather     Uterine cancer Paternal Grandmother     No Known Problems Paternal Grandfather      Heart attack Son 36        acute MI, NSTEMI    Cancer Maternal Aunt         nonhodgkins lymphoma    No Known Problems Paternal Aunt     No Known Problems Paternal Aunt     No Known Problems Paternal Aunt     No Known Problems Paternal Aunt     No Known Problems Paternal Aunt     Lung cancer Other     Hypertension Family     Ulcers Family         peptic       Social History     Occupational History    Not on file   Tobacco Use    Smoking status: Former     Current packs/day: 0.00     Average packs/day: 0.5 packs/day for 25.0 years (12.5 ttl pk-yrs)     Types: Cigarettes     Start date:      Quit date:      Years since quittin.2    Smokeless tobacco: Never    Tobacco comments:     quit 13 years ago   / quit 3-4 yrs ago, smoked 20-25 yrs per allscripts 12    Vaping Use    Vaping status: Never Used   Substance and Sexual Activity    Alcohol use: Not Currently     Comment: rare social occasion    Drug use: No    Sexual activity: Not on file         Current Outpatient Medications:     acetaminophen (TYLENOL) 650 mg CR tablet, Take 1 tablet (650 mg total) by mouth every 8 (eight) hours as needed for mild pain, Disp: 30 tablet, Rfl: 0    CALCIUM PO, Take by mouth occasionally, Disp: , Rfl:     enoxaparin (LOVENOX) 40 mg/0.4 mL, Inject 0.4 mL (40 mg total) under the skin daily for 28 days Start injections after surgery, Disp: 11.2 mL, Rfl: 0    gabapentin (Neurontin) 100 mg capsule, Take 1 capsule (100 mg total) by mouth 3 (three) times a day, Disp: 90 capsule, Rfl: 0    losartan (COZAAR) 50 mg tablet, Take 1 tablet (50 mg total) by mouth every evening, Disp: 90 tablet, Rfl: 1    methocarbamol (ROBAXIN) 500 mg tablet, Take 1 tablet (500 mg total) by mouth 3 (three) times a day as needed for muscle spasms, Disp: 60 tablet, Rfl: 0    metoprolol succinate (TOPROL-XL) 50 mg 24 hr tablet, Take 1 tablet (50 mg total) by mouth daily, Disp: 90 tablet, Rfl: 3    oxyCODONE (Roxicodone) 5 immediate release tablet,  "Take 1 tablet (5 mg total) by mouth every 6 (six) hours as needed for moderate pain for up to 10 days Max Daily Amount: 20 mg, Disp: 20 tablet, Rfl: 0    pantoprazole (PROTONIX) 40 mg tablet, TAKE ONE TABLET BY MOUTH 2 TIMES A DAY (Patient taking differently: daily), Disp: 180 tablet, Rfl: 0    PARoxetine (PAXIL) 10 mg tablet, Take 1 tablet (10 mg total) by mouth 2 (two) times a day, Disp: 180 tablet, Rfl: 1    rosuvastatin (CRESTOR) 10 MG tablet, Take 1 tablet (10 mg total) by mouth daily (Patient taking differently: Take 10 mg by mouth once a week), Disp: 90 tablet, Rfl: 1    VITAMIN D PO, Take by mouth, Disp: , Rfl:     Allergies   Allergen Reactions    Bactrim [Sulfamethoxazole-Trimethoprim] Hives, Itching and Rash    Sulfa Antibiotics Itching and Rash            Vitals:    04/02/24 1001   BP: 122/72   Pulse: 80       Objective:  Physical exam  General: Awake, Alert, Oriented  Eyes: Pupils equal, round and reactive to light  Heart: regular rate and rhythm  Lungs: No audible wheezing  Abdomen: soft                    Ortho Exam  Left knee:  Incision clean dry and intact  Staples well approximated   No erythema and no ecchymosis  Appropriate swelling of lower limb  Appropriate warmth of knee  Extensor mechanism intact  Extension 3  Flexion 90  Calf compartments soft and supple  Sensation intact  Toes are warm sensate and mobile      Diagnostics, reviewed and taken today if performed as documented:    The attending physician has personally reviewed the pertinent films in PACS and interpretation is as follows:  Left knee  x-ray:  Well aligned prosthesis with no acute changes.      Procedures, if performed today:    Procedures    None performed      Portions of the record may have been created with voice recognition software.  Occasional wrong word or \"sound a like\" substitutions may have occurred due to the inherent limitations of voice recognition software.  Read the chart carefully and recognize, using context, " where substitutions have occurred.     Home

## 2024-04-03 ENCOUNTER — OFFICE VISIT (OUTPATIENT)
Dept: PHYSICAL THERAPY | Age: 69
End: 2024-04-03
Payer: COMMERCIAL

## 2024-04-03 DIAGNOSIS — G89.29 CHRONIC PAIN OF LEFT KNEE: Primary | ICD-10-CM

## 2024-04-03 DIAGNOSIS — G89.18 ACUTE POST-OPERATIVE PAIN: ICD-10-CM

## 2024-04-03 DIAGNOSIS — M25.562 CHRONIC PAIN OF LEFT KNEE: Primary | ICD-10-CM

## 2024-04-03 PROCEDURE — 97112 NEUROMUSCULAR REEDUCATION: CPT | Performed by: PHYSICAL THERAPIST

## 2024-04-03 PROCEDURE — 97140 MANUAL THERAPY 1/> REGIONS: CPT | Performed by: PHYSICAL THERAPIST

## 2024-04-03 PROCEDURE — 97110 THERAPEUTIC EXERCISES: CPT | Performed by: PHYSICAL THERAPIST

## 2024-04-03 NOTE — PROGRESS NOTES
"Daily Note     Today's date: 4/3/2024  Patient name: Tonia Avelar  : 1955  MRN: [id number]  Referring provider: Bruce Mayorga,*  Dx:   Encounter Diagnosis     ICD-10-CM    1. Chronic pain of left knee  M25.562     G89.29       2. Acute post-operative pain  G89.18           Start Time: 0805  Stop Time: 0845  Total time in clinic (min): 40 minutes    Subjective: Pt reports increased pain since nerve block has worn off.       Objective: See treatment diary below      Assessment: Tolerated treatment well. Patient continues to demonstrate good TKE, slighty decreased ROM into flexion due to pain. Patient demonstrated fatigue post treatment and would benefit from continued PT      Plan: Continue per plan of care.      Precautions: LTKA 3/25/24      Manuals 3/28 4/3           PROM JF JF                                                  Neuro Re-Ed             Staning TKE wall ball  nv           Standing hip abd  nv           SLR  10x           Bridge  2x10                                                  Ther Ex             Quad sets HEP 20x5\"           Heel slides             Mini squats  2x10           LAQ  2x10           Heel prop  5'           Nu step or Recumben for ROM  10'                                     Ther Activity                                       Gait Training                                       Modalities                                            "

## 2024-04-04 LAB
DME PARACHUTE DELIVERY DATE ACTUAL: NORMAL
DME PARACHUTE DELIVERY DATE REQUESTED: NORMAL
DME PARACHUTE ITEM DESCRIPTION: NORMAL
DME PARACHUTE ORDER STATUS: NORMAL
DME PARACHUTE SUPPLIER NAME: NORMAL
DME PARACHUTE SUPPLIER PHONE: NORMAL

## 2024-04-05 ENCOUNTER — OFFICE VISIT (OUTPATIENT)
Dept: PHYSICAL THERAPY | Age: 69
End: 2024-04-05
Payer: COMMERCIAL

## 2024-04-05 DIAGNOSIS — G89.18 ACUTE POST-OPERATIVE PAIN: ICD-10-CM

## 2024-04-05 DIAGNOSIS — M25.562 CHRONIC PAIN OF LEFT KNEE: Primary | ICD-10-CM

## 2024-04-05 DIAGNOSIS — G89.29 CHRONIC PAIN OF LEFT KNEE: Primary | ICD-10-CM

## 2024-04-05 PROCEDURE — 97110 THERAPEUTIC EXERCISES: CPT | Performed by: PHYSICAL THERAPIST

## 2024-04-05 PROCEDURE — 97140 MANUAL THERAPY 1/> REGIONS: CPT | Performed by: PHYSICAL THERAPIST

## 2024-04-05 NOTE — PROGRESS NOTES
"Daily Note     Today's date: 2024  Patient name: Tonia Avelar  : 1955  MRN: [id number]  Referring provider: Bruce Mayorga,*  Dx:   Encounter Diagnosis     ICD-10-CM    1. Chronic pain of left knee  M25.562     G89.29       2. Acute post-operative pain  G89.18           Start Time: 1105  Stop Time: 1150  Total time in clinic (min): 45 minutes    Subjective: Pt reports increased symptoms but being compliant with HEP.       Objective: See treatment diary below      Assessment: Tolerated treatment well. Patient has increased symptoms but is still doing well from a ROM standpoint (0-90). Patient demonstrated fatigue post treatment and would benefit from continued PT      Plan: Continue per plan of care.      Precautions: LTKA 3/25/24      Manuals 3/28 4/5           PROM JF JF                                                  Neuro Re-Ed             Staning TKE wall ball  nv           Standing hip abd  nv           SLR             Bridge  2x10                                                  Ther Ex             Quad sets HEP 20x5\"           Heel slides  20x5\"           Mini squats  2x10           LAQ  2x10           Heel prop  5'           Nu step or Recumben for ROM  10'           Standing lunge on step  2x10                        Ther Activity                                       Gait Training                                       Modalities                                              "

## 2024-04-09 ENCOUNTER — OFFICE VISIT (OUTPATIENT)
Dept: OBGYN CLINIC | Facility: HOSPITAL | Age: 69
End: 2024-04-09

## 2024-04-09 VITALS
SYSTOLIC BLOOD PRESSURE: 122 MMHG | DIASTOLIC BLOOD PRESSURE: 61 MMHG | HEART RATE: 72 BPM | BODY MASS INDEX: 28.88 KG/M2 | HEIGHT: 59 IN

## 2024-04-09 DIAGNOSIS — Z96.652 AFTERCARE FOLLOWING LEFT KNEE JOINT REPLACEMENT SURGERY: Primary | ICD-10-CM

## 2024-04-09 DIAGNOSIS — G89.18 POST-OPERATIVE PAIN: ICD-10-CM

## 2024-04-09 DIAGNOSIS — Z47.1 AFTERCARE FOLLOWING LEFT KNEE JOINT REPLACEMENT SURGERY: Primary | ICD-10-CM

## 2024-04-09 PROCEDURE — 99024 POSTOP FOLLOW-UP VISIT: CPT | Performed by: ORTHOPAEDIC SURGERY

## 2024-04-09 RX ORDER — OXYCODONE HYDROCHLORIDE 5 MG/1
5 TABLET ORAL EVERY 6 HOURS PRN
Qty: 20 TABLET | Refills: 0 | Status: SHIPPED | OUTPATIENT
Start: 2024-04-09 | End: 2024-04-19

## 2024-04-09 NOTE — PROGRESS NOTES
Assessment:   Diagnosis ICD-10-CM Associated Orders   1. Aftercare following left knee joint replacement surgery  Z47.1     Z96.652       2. Post-operative pain  G89.18           Plan:  Second postoperative visit 2 weeks status post left total knee arthroplasty.  Her incision is healed adequately and staples removed successfully.  She may get her incision wet, do not submerge, pat dry.  Complete Lovenox as instructed.  Continue physical therapy home exercise program maximize recovery.  Ice and elevate as indicated.  Script of oxy sent to her pharmacy  May start to wean down on the Neurontin as discussed    To do next visit:  Return in about 4 weeks (around 5/7/2024) for re-check.    The above stated was discussed in layman's terms and the patient expressed understanding.  All questions were answered to the patient's satisfaction.       Scribe Attestation      I,:  Rashel Simon am acting as a scribe while in the presence of the attending physician.:       I,:  Bruce Mayorga MD personally performed the services described in this documentation    as scribed in my presence.:               Subjective:   Tonia Avelar is a 68 y.o. female who presents today second postoperative visit 2 weeks status post left total knee arthroplasty.  Overall she is doing very well.  She presents using a single-point cane for ambulatory assistance.  She takes her oxycodone and Neurontin as instructed.  She has been administering her Lovenox for DVT prophylaxis.  She is attending physical therapy and progressing well.  Denies any calf or thigh pain.  Denies any fevers or chills.  Overall her pain is rather well-controlled.      Review of systems negative unless otherwise specified in HPI  Review of Systems    Past Medical History:   Diagnosis Date    Anxiety     last assessed 8/24/15    Arthralgia of right knee     last assessed 5/27/14    Chronic interstitial cystitis     GERD (gastroesophageal reflux disease)     High  cholesterol     History of palpitations     Hypertension     Hypokalemia     last assessed 3/7/13    Irritable bowel syndrome     Osteoarthritis     last assessed 12    Osteopenia     last assessed 3/21/13    PONV (postoperative nausea and vomiting)     Postmenopausal atrophic vaginitis     last assessed 3/21/13    Stress incontinence     Uterine leiomyoma     resolved        Past Surgical History:   Procedure Laterality Date    BREAST CYST ASPIRATION Left     benign    BREAST SURGERY      puncture aspiration of cyst, left, resolved     CARDIAC CATHETERIZATION       SECTION  1977    daughter    COLONOSCOPY      1 polyp-hyperplastic by Dr. Nielsen    COLONOSCOPY      Focal active colitis-acute by Dr. Nielsen    EGD      Schatzki's ring dilated with 52 Bengali Segura, biopsy negative for Beard's by Dr. Way    EGD      Irregular Z line and biopsy positive for Beard's/intestinal metaplasia by Dr. Way    EGD      Short segment Beard's biopsy positive Intestinal metaplasia , No dysplasia, fundic gland polyp by Dr. Way    ESOPHAGOGASTRODUODENOSCOPY N/A 2016    Normal-fundic gland polyp, biopsy of the EG junction normal by Dr. Way    KNEE ARTHROSCOPY      NE ARTHRP KNE CONDYLE&PLATU MEDIAL&LAT COMPARTMENTS Right 2018    Procedure: ARTHROPLASTY KNEE TOTAL;  Surgeon: Bruce Mayorga MD;  Location: [facility];  Service: Orthopedics  resolved  per [employer]    NE ARTHRP KNE CONDYLE&PLATU MEDIAL&LAT COMPARTMENTS Left 3/25/2024    Procedure: ARTHROPLASTY KNEE TOTAL W ROBOT;  Surgeon: Bruce Mayorga MD;  Location:  [facility];  Service: Orthopedics    VAGINAL HYSTERECTOMY      fibroids, resolved        Family History   Problem Relation Age of Onset    Cancer Mother     Hypertension Mother     Heart attack Father         MI    Hypertension Father     Heart disease Father         coronary artery disease    No Known Problems Sister     No Known  Problems Sister     No Known Problems Daughter     Heart attack Maternal Grandmother     Stomach cancer Maternal Grandfather     Uterine cancer Paternal Grandmother     No Known Problems Paternal Grandfather     Heart attack Son 36        acute MI, NSTEMI    Cancer Maternal Aunt         nonhodgkins lymphoma    No Known Problems Paternal Aunt     No Known Problems Paternal Aunt     No Known Problems Paternal Aunt     No Known Problems Paternal Aunt     No Known Problems Paternal Aunt     Lung cancer Other     Hypertension Family     Ulcers Family         peptic       Social History     Occupational History    Not on file   Tobacco Use    Smoking status: Former     Current packs/day: 0.00     Average packs/day: 0.5 packs/day for 25.0 years (12.5 ttl pk-yrs)     Types: Cigarettes     Start date:      Quit date:      Years since quittin.2    Smokeless tobacco: Never    Tobacco comments:     quit 13 years ago   / quit 3-4 yrs ago, smoked 20-25 yrs per allscripts 12    Vaping Use    Vaping status: Never Used   Substance and Sexual Activity    Alcohol use: Not Currently     Comment: rare social occasion    Drug use: No    Sexual activity: Not on file         Current Outpatient Medications:     acetaminophen (TYLENOL) 650 mg CR tablet, Take 1 tablet (650 mg total) by mouth every 8 (eight) hours as needed for mild pain, Disp: 30 tablet, Rfl: 0    CALCIUM PO, Take by mouth occasionally, Disp: , Rfl:     enoxaparin (LOVENOX) 40 mg/0.4 mL, Inject 0.4 mL (40 mg total) under the skin daily for 28 days Start injections after surgery, Disp: 11.2 mL, Rfl: 0    gabapentin (Neurontin) 100 mg capsule, Take 1 capsule (100 mg total) by mouth 3 (three) times a day, Disp: 90 capsule, Rfl: 0    losartan (COZAAR) 50 mg tablet, Take 1 tablet (50 mg total) by mouth every evening, Disp: 90 tablet, Rfl: 1    methocarbamol (ROBAXIN) 500 mg tablet, Take 1 tablet (500 mg total) by mouth 3 (three) times a day as needed for muscle  spasms, Disp: 60 tablet, Rfl: 0    metoprolol succinate (TOPROL-XL) 50 mg 24 hr tablet, Take 1 tablet (50 mg total) by mouth daily, Disp: 90 tablet, Rfl: 3    oxyCODONE (Roxicodone) 5 immediate release tablet, Take 1 tablet (5 mg total) by mouth every 6 (six) hours as needed for moderate pain for up to 10 days Max Daily Amount: 20 mg, Disp: 20 tablet, Rfl: 0    pantoprazole (PROTONIX) 40 mg tablet, TAKE ONE TABLET BY MOUTH 2 TIMES A DAY (Patient taking differently: daily), Disp: 180 tablet, Rfl: 0    PARoxetine (PAXIL) 10 mg tablet, Take 1 tablet (10 mg total) by mouth 2 (two) times a day, Disp: 180 tablet, Rfl: 1    rosuvastatin (CRESTOR) 10 MG tablet, Take 1 tablet (10 mg total) by mouth daily (Patient taking differently: Take 10 mg by mouth once a week), Disp: 90 tablet, Rfl: 1    VITAMIN D PO, Take by mouth, Disp: , Rfl:     Allergies   Allergen Reactions    Bactrim [Sulfamethoxazole-Trimethoprim] Hives, Itching and Rash    Sulfa Antibiotics Itching and Rash            Vitals:    04/09/24 1024   BP: 122/61   Pulse: 72       Body mass index is 28.88 kg/m².  Wt Readings from Last 3 Encounters:   03/25/24 64.9 kg (143 lb)   03/01/24 66 kg (145 lb 9.6 oz)   01/29/24 67.6 kg (149 lb)       Objective:                    Left Knee Exam     Range of Motion   Left knee extension: near full extension.   Left knee flexion: nearly 90 degrees easily.     Other   Erythema: absent  Swelling: moderate    Comments:    Intact extensor  mechanism  Healed anterior incision with staples in place which were removed successfully  No drainage, appropriate amount of warmth, no gross signs of infection  Calf and thigh are soft and non-tender, no signs of DVT            Diagnostics, reviewed and taken today if performed as documented:    None performed            Procedures, if performed today:    Procedures    None performed      Portions of the record may have been created with voice recognition software.  Occasional wrong word or  "\"sound a like\" substitutions may have occurred due to the inherent limitations of voice recognition software.  Read the chart carefully and recognize, using context, where substitutions have occurred.  "

## 2024-04-10 ENCOUNTER — OFFICE VISIT (OUTPATIENT)
Dept: PHYSICAL THERAPY | Age: 69
End: 2024-04-10
Payer: COMMERCIAL

## 2024-04-10 ENCOUNTER — TELEPHONE (OUTPATIENT)
Age: 69
End: 2024-04-10

## 2024-04-10 DIAGNOSIS — G89.18 ACUTE POST-OPERATIVE PAIN: ICD-10-CM

## 2024-04-10 DIAGNOSIS — M25.562 CHRONIC PAIN OF LEFT KNEE: Primary | ICD-10-CM

## 2024-04-10 DIAGNOSIS — G89.29 CHRONIC PAIN OF LEFT KNEE: Primary | ICD-10-CM

## 2024-04-10 PROCEDURE — 97112 NEUROMUSCULAR REEDUCATION: CPT

## 2024-04-10 PROCEDURE — 97110 THERAPEUTIC EXERCISES: CPT

## 2024-04-10 NOTE — TELEPHONE ENCOUNTER
Caller: Self    Doctor: Tierra    Reason for call: Patient wanted to let leave team know that section 3, which was scanned in on 4/8/24 and 4/9/24 was the portion that [facility] needed. She spoke to them over the phone to complete the other sections    Call back#: [phone]

## 2024-04-10 NOTE — PROGRESS NOTES
"Daily Note     Today's date: 4/10/2024  Patient name: Tonia Avelar  : 1955  MRN: [id number]  Referring provider: Bruce Mayorga,*  Dx:   Encounter Diagnosis     ICD-10-CM    1. Chronic pain of left knee  M25.562     G89.29       2. Acute post-operative pain  G89.18           Start Time: 1105  Stop Time: 1200  Total time in clinic (min): 55 minutes    Subjective: Pt reports increased symptoms but being compliant with HEP.       Objective: See treatment diary below      Assessment:  Pt soreness improved with exercises today. Patient has increased symptoms but is still doing well from a ROM standpoint (0-98*). Pt balance and stability was challenged with programming today based on her improved strength and improved gait.   Tolerated treatment well.   Patient demonstrated fatigue post treatment and would benefit from continued PT      Plan: Continue per plan of care.  Recommended 3-5 minus total end range time for HEP to improve flexion PROM     Precautions: LTKA 3/25/24      Manuals 3/28 4/5           PROM JF JF                                                  Neuro Re-Ed             Swapna TKE wall ball  nv nv          Standing hip abd  nv nv          SLR   nv          Bridge  2x10 3x10          Quad set/  SAQ   10\"x  10          Tandem walk   10 step   Fw/bw    x5          hurdles   nv          Single leg balance   10\"x5                       Ther Ex             Quad sets HEP 20x5\" See NR          Heel slides  20x5\" 10\"x  15          Mini squats  2x10 3x10  Inc. pace          LAQ  2x10           Heel prop  5' nv          Nu step or Recumben for ROM  10' nv          Standing lunge on step  2x10 nv          Heel raise   3x10                                                 Ther Activity                                       Gait Training                                       Modalities                                              "

## 2024-04-10 NOTE — TELEPHONE ENCOUNTER
Caller: Patient    Doctor: Tierra    Reason for call: Patients stated that Section 3 of her disability paper work is incomplete. Please correct and fax back. Patient asked to be notified when forms are complete    Call back#: [phone]

## 2024-04-10 NOTE — TELEPHONE ENCOUNTER
[employer] [place] forms have not been completed yet. Forms received 4/8 / 4/9 advised pt of TAT for forms. Advised we will try to get them done as soon as possible but at this point they have not been completed yet.

## 2024-04-10 NOTE — TELEPHONE ENCOUNTER
LM for pt asking pt to specify what is filled out incorrectly. Forms we have all questions are answered as they should be and nothing was left blank, unsure what pt needs

## 2024-04-10 NOTE — TELEPHONE ENCOUNTER
Caller: Tonia    Doctor: Tierra    Reason for call: Patient was in PT today.  Therapist did tell her she would have stiffness in her calf .Patient states she is not having any pain  in her calf After PT she noticed a semi hard ball in her calf. Is this something to be concerned about or is it from the therapy    Call back#: [phone]

## 2024-04-10 NOTE — TELEPHONE ENCOUNTER
Caller: Patient    Doctor: Tierra    Reason for call: Patient returned call.  Relayed message.  Patient satisfied.    Call back#: n/a

## 2024-04-12 ENCOUNTER — OFFICE VISIT (OUTPATIENT)
Dept: PHYSICAL THERAPY | Age: 69
End: 2024-04-12
Payer: COMMERCIAL

## 2024-04-12 DIAGNOSIS — G89.29 CHRONIC PAIN OF LEFT KNEE: Primary | ICD-10-CM

## 2024-04-12 DIAGNOSIS — G89.18 ACUTE POST-OPERATIVE PAIN: ICD-10-CM

## 2024-04-12 DIAGNOSIS — M25.562 CHRONIC PAIN OF LEFT KNEE: Primary | ICD-10-CM

## 2024-04-12 PROCEDURE — 97140 MANUAL THERAPY 1/> REGIONS: CPT | Performed by: PHYSICAL THERAPIST

## 2024-04-12 PROCEDURE — 97110 THERAPEUTIC EXERCISES: CPT | Performed by: PHYSICAL THERAPIST

## 2024-04-12 PROCEDURE — 97112 NEUROMUSCULAR REEDUCATION: CPT | Performed by: PHYSICAL THERAPIST

## 2024-04-12 NOTE — PROGRESS NOTES
"Daily Note     Today's date: 2024  Patient name: Tonia Avelar  : 1955  MRN: [id number]  Referring provider: Bruce Mayorga,*  Dx:   Encounter Diagnosis     ICD-10-CM    1. Chronic pain of left knee  M25.562     G89.29       2. Acute post-operative pain  G89.18           Start Time: 0800  Stop Time: 0850  Total time in clinic (min): 50 minutes    Subjective: Pt reports improvements in ROM, some stiffness in calf.       Objective: See treatment diary below      Assessment: Tolerated treatment well. Patient demonstrated fatigue post treatment and would benefit from continued PT      Plan: Continue per plan of care.      Precautions: LTKA 3/25/24      Manuals 3/28 4/5  4/12         PROM JF JF  JF                                                Neuro Re-Ed             Staning TKE wall ball  nv nv 2x10x5\"         Standing hip abd  nv nv          SLR   nv          Bridge  2x10 3x10 3x10         Quad set/  SAQ   10\"x  10 20x5\"         Tandem walk   10 step   Fw/bw    x5          hurdles   nv          Single leg balance   10\"x5                       Ther Ex             Quad sets HEP 20x5\" See NR          Heel slides  20x5\" 10\"x  15          Mini squats  2x10 3x10  Inc. pace 3x10         LAQ  2x10           Heel prop  5' nv          Nu step or Recumben for ROM  10' nv          Standing lunge on step  2x10 nv 2x10x5\"         Heel raise   3x10          Nu step    10'                                   Ther Activity                                       Gait Training                                       Modalities                                                "

## 2024-04-15 ENCOUNTER — TELEPHONE (OUTPATIENT)
Age: 69
End: 2024-04-15

## 2024-04-15 NOTE — TELEPHONE ENCOUNTER
Caller: patient    Doctor: marisela    Reason for call: has questions about medication, how long should she be taking oxycodone?    Call back#: [phone]

## 2024-04-16 ENCOUNTER — TELEPHONE (OUTPATIENT)
Age: 69
End: 2024-04-16

## 2024-04-16 DIAGNOSIS — Z47.1 AFTERCARE FOLLOWING LEFT KNEE JOINT REPLACEMENT SURGERY: Primary | ICD-10-CM

## 2024-04-16 DIAGNOSIS — Z96.652 AFTERCARE FOLLOWING LEFT KNEE JOINT REPLACEMENT SURGERY: Primary | ICD-10-CM

## 2024-04-16 RX ORDER — ONDANSETRON 4 MG/1
4 TABLET, FILM COATED ORAL EVERY 8 HOURS PRN
Qty: 20 TABLET | Refills: 0 | Status: SHIPPED | OUTPATIENT
Start: 2024-04-16 | End: 2024-04-16

## 2024-04-16 RX ORDER — ONDANSETRON 4 MG/1
4 TABLET, FILM COATED ORAL EVERY 8 HOURS PRN
Qty: 20 TABLET | Refills: 0 | Status: SHIPPED | OUTPATIENT
Start: 2024-04-16

## 2024-04-16 NOTE — TELEPHONE ENCOUNTER
Caller: Patient     Doctor: Tierra     Reason for call: Patient asking if the Zofran can be called in to [facility], [address], [place] instead of [facility]. She is also asking if the Zofran would help if the nausea/vomiting was not from the meds but from a virus. She was asking if she should continue the Gabapentin and the Oxycodone and could that be causing it. Please advise     Call back#: [phone]

## 2024-04-16 NOTE — TELEPHONE ENCOUNTER
Caller: Patient     Doctor: Tierra    Reason for call: Patient is calling because she started to vomit this morning ans is thinking it may the medication that is causing her to feel sick. Please advise.    Call back#: [phone]

## 2024-04-16 NOTE — TELEPHONE ENCOUNTER
Patient stated knee replacement three weeks ago, Dr. Mayorga put her on Gabapentin and Tylenol 3.At first she was queasy but after taking with food it subsided. This morning at 5:30am she decided to take all her meds at the same time with crackers and started vomiting by 7:15am and has not stopped since. She did speak to Dr. Mayorga's office (telephone encounter) and he called in [facility] but was advised to reach out to her PCP in case further evaluation, please advise. Offered patient appt but she did not have a way of getting in.

## 2024-04-17 ENCOUNTER — OFFICE VISIT (OUTPATIENT)
Dept: FAMILY MEDICINE CLINIC | Facility: CLINIC | Age: 69
End: 2024-04-17
Payer: COMMERCIAL

## 2024-04-17 ENCOUNTER — APPOINTMENT (OUTPATIENT)
Dept: PHYSICAL THERAPY | Age: 69
End: 2024-04-17
Payer: COMMERCIAL

## 2024-04-17 VITALS
BODY MASS INDEX: 30.48 KG/M2 | HEIGHT: 59 IN | OXYGEN SATURATION: 98 % | SYSTOLIC BLOOD PRESSURE: 146 MMHG | HEART RATE: 78 BPM | WEIGHT: 151.2 LBS | DIASTOLIC BLOOD PRESSURE: 86 MMHG | TEMPERATURE: 97.9 F

## 2024-04-17 DIAGNOSIS — J01.40 ACUTE PANSINUSITIS, RECURRENCE NOT SPECIFIED: ICD-10-CM

## 2024-04-17 DIAGNOSIS — R51.9 ACUTE NONINTRACTABLE HEADACHE, UNSPECIFIED HEADACHE TYPE: Primary | ICD-10-CM

## 2024-04-17 PROBLEM — Z01.818 PRE-OP EXAMINATION: Status: RESOLVED | Noted: 2024-03-01 | Resolved: 2024-04-17

## 2024-04-17 PROBLEM — R21 RASH: Status: RESOLVED | Noted: 2023-11-09 | Resolved: 2024-04-17

## 2024-04-17 PROCEDURE — 99213 OFFICE O/P EST LOW 20 MIN: CPT | Performed by: PHYSICIAN ASSISTANT

## 2024-04-17 RX ORDER — AMOXICILLIN 500 MG/1
500 CAPSULE ORAL EVERY 8 HOURS SCHEDULED
Qty: 30 CAPSULE | Refills: 0 | Status: SHIPPED | OUTPATIENT
Start: 2024-04-17 | End: 2024-04-27

## 2024-04-17 NOTE — PROGRESS NOTES
Assessment/Plan:     Diagnoses and all orders for this visit:    Acute nonintractable headache, unspecified headache type  Comments:  May continue her oxycodone and Tylenol.    Acute pansinusitis, recurrence not specified  Comments:  Oxacillin ordered.  Orders:  -     amoxicillin (AMOXIL) 500 mg capsule; Take 1 capsule (500 mg total) by mouth every 8 (eight) hours for 10 days          Subjective:      Patient ID: Tonia Avelar is a 68 y.o. female.    Patient presents in the office with headache.  Patient states headache started yesterday during a bout of vomiting all day.  The headache is dull and maxillary.  Also has pressure in your ears.  She recently had a left total knee replacement on March 25.  Yesterday morning she took her Tylenol, oxycodone and gabapentin.  She became very very nauseous.  Vomited every hour on the hour.  Did obtain some Zofran which has helped.  Vomiting today patient states she took her oxygen this morning but did not take the gabapentin.  Patient states  she has   abdominal   pain  She had some soft BMs but no real diarrhea.        The following portions of the patient's history were reviewed and updated as appropriate: She   Patient Active Problem List    Diagnosis Date Noted    Pain of left hip 01/29/2024    Chronic left-sided low back pain with left-sided sciatica 11/09/2023    Anxiety disorder 02/03/2023    Prediabetes 01/11/2023    Pes anserinus bursitis of right knee 06/17/2022    Palpitations 11/07/2019    History of right knee joint replacement 03/02/2018    Chronic GERD 01/03/2018    Primary osteoarthritis of left knee 11/14/2017    Dyslipidemia 06/13/2012    Hypertension 06/13/2012     Current Outpatient Medications   Medication Sig Dispense Refill    acetaminophen (TYLENOL) 650 mg CR tablet Take 1 tablet (650 mg total) by mouth every 8 (eight) hours as needed for mild pain 30 tablet 0    amoxicillin (AMOXIL) 500 mg capsule Take 1 capsule (500 mg total) by mouth every 8  (eight) hours for 10 days 30 capsule 0    CALCIUM PO Take by mouth occasionally      losartan (COZAAR) 50 mg tablet Take 1 tablet (50 mg total) by mouth every evening 90 tablet 1    metoprolol succinate (TOPROL-XL) 50 mg 24 hr tablet Take 1 tablet (50 mg total) by mouth daily 90 tablet 3    ondansetron (ZOFRAN) 4 mg tablet Take 1 tablet (4 mg total) by mouth every 8 (eight) hours as needed for nausea or vomiting 20 tablet 0    oxyCODONE (Roxicodone) 5 immediate release tablet Take 1 tablet (5 mg total) by mouth every 6 (six) hours as needed for moderate pain for up to 10 days Max Daily Amount: 20 mg 20 tablet 0    pantoprazole (PROTONIX) 40 mg tablet TAKE ONE TABLET BY MOUTH 2 TIMES A DAY (Patient taking differently: daily) 180 tablet 0    PARoxetine (PAXIL) 10 mg tablet Take 1 tablet (10 mg total) by mouth 2 (two) times a day 180 tablet 1    VITAMIN D PO Take by mouth      enoxaparin (LOVENOX) 40 mg/0.4 mL Inject 0.4 mL (40 mg total) under the skin daily for 28 days Start injections after surgery 11.2 mL 0    gabapentin (Neurontin) 100 mg capsule Take 1 capsule (100 mg total) by mouth 3 (three) times a day (Patient not taking: Reported on 4/17/2024) 90 capsule 0    methocarbamol (ROBAXIN) 500 mg tablet Take 1 tablet (500 mg total) by mouth 3 (three) times a day as needed for muscle spasms (Patient not taking: Reported on 4/17/2024) 60 tablet 0    rosuvastatin (CRESTOR) 10 MG tablet Take 1 tablet (10 mg total) by mouth daily (Patient not taking: Reported on 4/17/2024) 90 tablet 1     No current facility-administered medications for this visit.     She is allergic to bactrim [sulfamethoxazole-trimethoprim] and sulfa antibiotics..    Review of Systems   Constitutional:  Positive for appetite change. Negative for activity change.   HENT:  Positive for congestion, ear pain, sinus pressure and sinus pain.    Gastrointestinal:  Positive for abdominal pain, nausea and vomiting.   Neurological:  Positive for headaches.          Objective:        Physical Exam  Vitals and nursing note reviewed.   Constitutional:       General: She is not in acute distress.     Appearance: Normal appearance. She is not diaphoretic.   HENT:      Head: Normocephalic and atraumatic.      Right Ear: Tympanic membrane, ear canal and external ear normal.      Left Ear: Tympanic membrane, ear canal and external ear normal.      Nose:      Right Sinus: Maxillary sinus tenderness and frontal sinus tenderness present.      Left Sinus: Maxillary sinus tenderness and frontal sinus tenderness present.      Mouth/Throat:      Pharynx: No oropharyngeal exudate or posterior oropharyngeal erythema.   Eyes:      Conjunctiva/sclera: Conjunctivae normal.      Pupils: Pupils are equal, round, and reactive to light.   Neck:      Thyroid: No thyromegaly.      Vascular: No carotid bruit.   Cardiovascular:      Rate and Rhythm: Normal rate and regular rhythm.      Heart sounds: No murmur heard.     No friction rub. No gallop.   Pulmonary:      Effort: Pulmonary effort is normal. No respiratory distress.      Breath sounds: Normal breath sounds. No wheezing.   Abdominal:      General: Bowel sounds are normal. There is no distension.      Palpations: Abdomen is soft. There is no mass.      Tenderness: There is no abdominal tenderness.   Lymphadenopathy:      Cervical: No cervical adenopathy.   Skin:     General: Skin is warm and dry.      Findings: No erythema or rash.   Neurological:      Mental Status: She is alert and oriented to person, place, and time.   Psychiatric:         Mood and Affect: Mood normal.         Behavior: Behavior normal.         Thought Content: Thought content normal.         Judgment: Judgment normal.

## 2024-04-19 ENCOUNTER — OFFICE VISIT (OUTPATIENT)
Dept: PHYSICAL THERAPY | Age: 69
End: 2024-04-19
Payer: COMMERCIAL

## 2024-04-19 DIAGNOSIS — M25.562 CHRONIC PAIN OF LEFT KNEE: Primary | ICD-10-CM

## 2024-04-19 DIAGNOSIS — Z96.652 AFTERCARE FOLLOWING LEFT KNEE JOINT REPLACEMENT SURGERY: ICD-10-CM

## 2024-04-19 DIAGNOSIS — G89.18 ACUTE POST-OPERATIVE PAIN: ICD-10-CM

## 2024-04-19 DIAGNOSIS — G89.18 POST-OPERATIVE PAIN: ICD-10-CM

## 2024-04-19 DIAGNOSIS — Z47.1 AFTERCARE FOLLOWING LEFT KNEE JOINT REPLACEMENT SURGERY: ICD-10-CM

## 2024-04-19 DIAGNOSIS — G89.29 CHRONIC PAIN OF LEFT KNEE: Primary | ICD-10-CM

## 2024-04-19 PROCEDURE — 97140 MANUAL THERAPY 1/> REGIONS: CPT

## 2024-04-19 PROCEDURE — 97110 THERAPEUTIC EXERCISES: CPT

## 2024-04-19 PROCEDURE — 97112 NEUROMUSCULAR REEDUCATION: CPT

## 2024-04-19 RX ORDER — OXYCODONE HYDROCHLORIDE 5 MG/1
5 TABLET ORAL EVERY 6 HOURS PRN
Qty: 20 TABLET | Refills: 0 | Status: SHIPPED | OUTPATIENT
Start: 2024-04-19 | End: 2024-04-29

## 2024-04-19 NOTE — PROGRESS NOTES
"Daily Note     Today's date: 2024  Patient name: Tonia Avelar  : 1955  MRN: [id number]  Referring provider: Bruce Mayorga,*  Dx:   Encounter Diagnosis     ICD-10-CM    1. Chronic pain of left knee  M25.562     G89.29       2. Acute post-operative pain  G89.18                      Subjective: Pt reports that she still has stiffness in her L Knee if she is not as active noting that she has been using her bike at home when she can. She was sick earlier this week but is feeling better.      Objective: See treatment diary below      Assessment: Tolerated treatment well beginning on the nu step again to start session. Continues to demonstrate a tight end feel in both directions being more limited into flexion then extension. Re educated pt on the importance of her HEP. Patient demonstrated fatigue post treatment and would benefit from continued PT      Plan: Continue per plan of care.      Precautions: LTKA 3/25/24      Manuals 3/28 4/5  4/12 4/19 foto        PROM JF JF  JF MM                                               Neuro Re-Ed             Staning TKE wall ball  nv nv 2x10x5\" 2x10 5\"        Standing hip abd  nv nv          SLR   nv          Bridge  2x10 3x10 3x10 3x10        Quad set/  SAQ   10\"x  10 20x5\" 20x5\"        Tandem walk   10 step   Fw/bw    x5          hurdles   nv          Single leg balance   10\"x5                       Ther Ex             Quad sets HEP 20x5\" See NR          Heel slides  20x5\" 10\"x  15  10\"x 15        Mini squats  2x10 3x10  Inc. pace 3x10 3x10        LAQ  2x10           Heel prop  5' nv          Nu step or Recumben for ROM  10' nv          Standing lunge on step  2x10 nv 2x10x5\" 2x10 5\"        Heel raise   3x10          Nu step    10' 10'                                  Ther Activity                                       Gait Training                                       Modalities                                                "

## 2024-04-23 ENCOUNTER — TELEPHONE (OUTPATIENT)
Age: 69
End: 2024-04-23

## 2024-04-23 NOTE — TELEPHONE ENCOUNTER
Caller: Tonia     Doctor: Tierra     Reason for call: She just finisher her 30 days of Lovenox, and would like to know if she can switch from Tylenol to Advil. And she also would like to know if there is any where to get rid of the jug of sharps that she has.     Call back#: [phone]

## 2024-04-24 ENCOUNTER — OFFICE VISIT (OUTPATIENT)
Dept: PHYSICAL THERAPY | Age: 69
End: 2024-04-24
Payer: COMMERCIAL

## 2024-04-24 DIAGNOSIS — G89.18 ACUTE POST-OPERATIVE PAIN: ICD-10-CM

## 2024-04-24 DIAGNOSIS — M25.562 CHRONIC PAIN OF LEFT KNEE: Primary | ICD-10-CM

## 2024-04-24 DIAGNOSIS — G89.29 CHRONIC PAIN OF LEFT KNEE: Primary | ICD-10-CM

## 2024-04-24 PROCEDURE — 97530 THERAPEUTIC ACTIVITIES: CPT

## 2024-04-24 PROCEDURE — 97110 THERAPEUTIC EXERCISES: CPT

## 2024-04-24 PROCEDURE — 97140 MANUAL THERAPY 1/> REGIONS: CPT

## 2024-04-24 NOTE — PROGRESS NOTES
"Daily Note     Today's date: 2024  Patient name: Tonia Avelar  : 1955  MRN: [id number]  Referring provider: Bruce Mayorga,*  Dx:   Encounter Diagnosis     ICD-10-CM    1. Chronic pain of left knee  M25.562     G89.29       2. Acute post-operative pain  G89.18             Start Time: 1100  Stop Time: 1145  Total time in clinic (min): 45 minutes    Subjective: Pt reports that she still has stiffness in her knee infrequently. She is walking without her cane more.      Objective: See treatment diary below      Assessment: Tolerated treatment well   Knee extension improved per PT assessment.  ROM-flexion signficantly better as well.  Pt reaching 122* flexion.  Pt educated on gradual knee exposure to pressure to be able to kneel when appropriate.  Pt performed step ups and step downs with emphasis on sequencing and form to return prior function.   Re educated pt on the importance of her HEP. Patient demonstrated fatigue post treatment and would benefit from continued PT      Plan: Continue per plan of care.      Precautions: LTKA 3/25/24      Manuals 3/28 4/5   foto        PROM JF JF  JF MM KD                                              Neuro Re-Ed             Staning TKE wall ball  nv nv 2x10x5\" 2x10 5\"        Standing hip abd  nv nv          SLR   nv          Bridge  2x10 3x10 3x10 3x10        Quad set/  SAQ   10\"x  10 20x5\" 20x5\"        Tandem walk   10 step   Fw/bw    x5          hurdles   nv          Single leg balance   10\"x5                       Ther Ex             Quad sets HEP 20x5\" See NR          Heel slides  20x5\" 10\"x  15  10\"x 15 15\"x  10       Mini squats  2x10 3x10  Inc. pace 3x10 3x10 3x10       LAQ  2x10           Heel prop  5' nv          Nu step or Recumben for ROM  10' nv          Standing lunge on step  2x10 nv 2x10x5\" 2x10 5\"        Heel raise   3x10          Nu step    10' 10'                                  Ther Activity             Step up      6\" " "R    3x10       Step down      4\" R    3x10       Kneel exposure      Chair    3'       Single leg press      55#    2x10                                 Gait Training                                       Modalities                                                "

## 2024-04-26 ENCOUNTER — OFFICE VISIT (OUTPATIENT)
Dept: PHYSICAL THERAPY | Age: 69
End: 2024-04-26
Payer: COMMERCIAL

## 2024-04-26 DIAGNOSIS — G89.29 CHRONIC PAIN OF LEFT KNEE: Primary | ICD-10-CM

## 2024-04-26 DIAGNOSIS — M25.562 CHRONIC PAIN OF LEFT KNEE: Primary | ICD-10-CM

## 2024-04-26 DIAGNOSIS — G89.18 ACUTE POST-OPERATIVE PAIN: ICD-10-CM

## 2024-04-26 PROCEDURE — 97530 THERAPEUTIC ACTIVITIES: CPT

## 2024-04-26 PROCEDURE — 97110 THERAPEUTIC EXERCISES: CPT

## 2024-04-26 PROCEDURE — 97140 MANUAL THERAPY 1/> REGIONS: CPT

## 2024-04-26 NOTE — PROGRESS NOTES
"Daily Note     Today's date: 2024  Patient name: Tonia Avelar  : 1955  MRN: [id number]  Referring provider: Bruce Mayorga,*  Dx:   Encounter Diagnosis     ICD-10-CM    1. Chronic pain of left knee  M25.562     G89.29       2. Acute post-operative pain  G89.18                        Subjective: Pt reports that she felt great the last 2 days since last session. Denies any pain for consecutive days.       Objective: See treatment diary below      Assessment: Tolerated treatment well beginning on the bike to start session as an active warmup. She continues to demonstrate good carry over with current POC. Challenged with SL press and mini squats but able to complete.     Re educated pt on the importance of her HEP. Patient demonstrated fatigue post treatment and would benefit from continued PT      Plan: Continue per plan of care.      Precautions: LTKA 3/25/24      Manuals 3/28 4/5  4/12 4/19 foto       PROM JF JF  JF MM KD MM                                             Neuro Re-Ed             Staning TKE wall ball  nv nv 2x10x5\" 2x10 5\"        Standing hip abd  nv nv          SLR   nv          Bridge  2x10 3x10 3x10 3x10        Quad set/  SAQ   10\"x  10 20x5\" 20x5\"        Tandem walk   10 step   Fw/bw    x5          hurdles   nv          Single leg balance   10\"x5                       Ther Ex             Quad sets HEP 20x5\" See NR          Heel slides  20x5\" 10\"x  15  10\"x 15 15\"x  10 15\"x 10      Mini squats  2x10 3x10  Inc. pace 3x10 3x10 3x10 3x10      LAQ  2x10           Heel prop  5' nv          Nu step or Recumben for ROM  10' nv          Standing lunge on step  2x10 nv 2x10x5\" 2x10 5\"        Heel raise   3x10          Nu step    10' 10'                                  Ther Activity             Step up      6\" R    3x10 6\" R    3x10      Step down      4\" R    3x10 6\" R 3x10      Kneel exposure      Chair    3'       Single leg press      55#    2x10 55# 2x10                 "                Gait Training                                       Modalities

## 2024-05-01 ENCOUNTER — OFFICE VISIT (OUTPATIENT)
Dept: PHYSICAL THERAPY | Age: 69
End: 2024-05-01
Payer: COMMERCIAL

## 2024-05-01 DIAGNOSIS — G89.29 CHRONIC PAIN OF LEFT KNEE: Primary | ICD-10-CM

## 2024-05-01 DIAGNOSIS — M25.562 CHRONIC PAIN OF LEFT KNEE: Primary | ICD-10-CM

## 2024-05-01 DIAGNOSIS — G89.18 ACUTE POST-OPERATIVE PAIN: ICD-10-CM

## 2024-05-01 PROCEDURE — 97140 MANUAL THERAPY 1/> REGIONS: CPT

## 2024-05-01 PROCEDURE — 97110 THERAPEUTIC EXERCISES: CPT

## 2024-05-01 PROCEDURE — 97530 THERAPEUTIC ACTIVITIES: CPT

## 2024-05-01 NOTE — PROGRESS NOTES
"Daily Note     Today's date: 2024  Patient name: Tonia Avelar  : 1955  MRN: [id number]  Referring provider: Bruce Mayorga,*  Dx:   Encounter Diagnosis     ICD-10-CM    1. Chronic pain of left knee  M25.562     G89.29       2. Acute post-operative pain  G89.18               Start Time: 1100  Stop Time: 1145  Total time in clinic (min): 45 minutes    Subjective: Pt reports that she felt great the last 2 days since last session. Denies any pain for consecutive days.       Objective: See treatment diary below      Assessment: Tolerated treatment well beginning on the bike to start session as an active warmup. She continues to demonstrate good carry over with current POC. Challenged with SL press and mini squats but able to complete.     Re educated pt on the importance of her HEP. Patient demonstrated fatigue post treatment and would benefit from continued PT      Plan: Continue per plan of care.      Precautions: LTKA 3/25/24      Manuals 3/28 4/5  4/12 4/19 foto      PROM JF JF  JF MM KD MM KD                                            Neuro Re-Ed             Staning TKE wall ball  nv nv 2x10x5\" 2x10 5\"        Standing hip abd  nv nv          SLR   nv          Bridge  2x10 3x10 3x10 3x10        Quad set/  SAQ   10\"x  10 20x5\" 20x5\"        Tandem walk   10 step   Fw/bw    x5          hurdles   nv          Single leg balance   10\"x5                       Ther Ex             Quad sets HEP 20x5\" See NR          Heel slides  20x5\" 10\"x  15  10\"x 15 15\"x  10 15\"x 10 15\"  x10     Mini squats  2x10 3x10  Inc. pace 3x10 3x10 3x10 3x10 nv     Reverse lunge        @ bar    3x10     LAQ  2x10           Heel prop  5' nv          Nu step or Recumben for ROM  10' nv          Standing lunge on step  2x10 nv 2x10x5\" 2x10 5\"        Heel raise   3x10          Nu step    10' 10'                                  Ther Activity             Step up      6\" R    3x10 6\" R    3x10 6\"  Flight    x10   " "  Step down      4\" R    3x10 6\" R 3x10 ^     Kneel exposure      Chair    3'       Single leg press      55#    2x10 55# 2x10     55#    2x10    Cross R over L                               Gait Training                                       Modalities                                                "

## 2024-05-02 ENCOUNTER — TELEPHONE (OUTPATIENT)
Age: 69
End: 2024-05-02

## 2024-05-02 DIAGNOSIS — Z47.1 AFTERCARE FOLLOWING LEFT KNEE JOINT REPLACEMENT SURGERY: Primary | ICD-10-CM

## 2024-05-02 DIAGNOSIS — Z96.652 AFTERCARE FOLLOWING LEFT KNEE JOINT REPLACEMENT SURGERY: Primary | ICD-10-CM

## 2024-05-02 RX ORDER — OXYCODONE HYDROCHLORIDE 5 MG/1
5 TABLET ORAL EVERY 6 HOURS PRN
Qty: 20 TABLET | Refills: 0 | Status: SHIPPED | OUTPATIENT
Start: 2024-05-02 | End: 2024-05-12

## 2024-05-02 NOTE — TELEPHONE ENCOUNTER
Reason for call:   [x] Refill   [] Prior Auth  [] Other:      Office:   [] PCP/Provider -   [x] Specialty/Provider - ortho/Dr Mayorga     Medication: oxyCODONE (Roxicodone) 5 immediate release tablet   Dose/Frequency: Take 1 tablet (5 mg total) by mouth every 6 (six) hours as needed for moderate pain for up to 10 days Max Daily Amount: 20 mg     Quantity: #20     Pharmacy: [facility] - [place], [place]  [address] [phone]      Does the patient have enough for 3 days?   [] Yes   [x] No - Send as HP to POD      Patient also has question in regard to taking Ibuprofen vs Tylenol.  She is done taking Lovenox and she is wondering when she should be taking the Ibuprofen?  Can it be taken with the Oxycodone or does it have to be separate?  Patient asking to speak to clinical team.

## 2024-05-03 ENCOUNTER — OFFICE VISIT (OUTPATIENT)
Dept: PHYSICAL THERAPY | Age: 69
End: 2024-05-03
Payer: COMMERCIAL

## 2024-05-03 DIAGNOSIS — M25.562 CHRONIC PAIN OF LEFT KNEE: Primary | ICD-10-CM

## 2024-05-03 DIAGNOSIS — G89.18 ACUTE POST-OPERATIVE PAIN: ICD-10-CM

## 2024-05-03 DIAGNOSIS — G89.29 CHRONIC PAIN OF LEFT KNEE: Primary | ICD-10-CM

## 2024-05-03 PROCEDURE — 97110 THERAPEUTIC EXERCISES: CPT

## 2024-05-03 PROCEDURE — 97530 THERAPEUTIC ACTIVITIES: CPT

## 2024-05-03 NOTE — PROGRESS NOTES
"Daily Note     Today's date: 5/3/2024  Patient name: Tonia Avelar  : 1955  MRN: [id number]  Referring provider: Bruce Mayorga,*  Dx:   Encounter Diagnosis     ICD-10-CM    1. Chronic pain of left knee  M25.562     G89.29       2. Acute post-operative pain  G89.18                 Start Time: 1530  Stop Time: 1610  Total time in clinic (min): 40 minutes    Subjective: Pt reports that she felt great the last 2 days since last session. Denies any pain for consecutive days.       Objective: See treatment diary below      Assessment: Pt is doing well with strengthening progressions.  She is improving in her ROM.  Stability with static and dynamic balance exercises was excellent today.   Tolerated treatment well. She continues to demonstrate good carry over with current POC.   Re educated pt on the importance of her HEP. Patient demonstrated fatigue post treatment and would benefit from continued PT      Plan: Continue per plan of care.      Precautions: LTKA 3/25/24      Manuals 3/28 4/5  4/12 4/19 foto 4/24 4/26 5/1 5/3    PROM JF JF  JF MM KD MM KD                                            Neuro Re-Ed             Staning TKE wall ball  nv nv 2x10x5\" 2x10 5\"        Standing hip abd  nv nv          SLR   nv          Bridge  2x10 3x10 3x10 3x10        Quad set/  SAQ   10\"x  10 20x5\" 20x5\"        Tandem walk   10 step   Fw/bw    x5      10 steps   Fw/bw    3x    hurdles   nv          Single leg balance   10\"x5      Ball catch    3x10                 Ther Ex             Quad sets HEP 20x5\" See NR          Heel slides  20x5\" 10\"x  15  10\"x 15 15\"x  10 15\"x 10 15\"  x10 15\"x  10 nv    Mini squats  2x10 3x10  Inc. pace 3x10 3x10 3x10 3x10 nv     Reverse lunge        @ bar    3x10 10#  @bar  3x10      LAQ  2x10           Heel prop  5' nv          Nu step or Recumben for ROM  10' nv          Standing lunge on step  2x10 nv 2x10x5\" 2x10 5\"        Heel raise   3x10      3x10    Nu step    10' 10'              " "                    Ther Activity             Step up      6\" R    3x10 6\" R    3x10 6\"  Flight    x10 6\" flight    x10    Step down      4\" R    3x10 6\" R 3x10 ^     Kneel exposure      Chair    3'       Single leg press      55#    2x10 55# 2x10     55#    2x10    Cross R over L 55#    3x10    Box lift         22#  3x10                 Gait Training                                       Modalities                                                "

## 2024-05-07 ENCOUNTER — OFFICE VISIT (OUTPATIENT)
Dept: OBGYN CLINIC | Facility: HOSPITAL | Age: 69
End: 2024-05-07

## 2024-05-07 VITALS
WEIGHT: 149 LBS | HEART RATE: 63 BPM | SYSTOLIC BLOOD PRESSURE: 115 MMHG | BODY MASS INDEX: 30.04 KG/M2 | HEIGHT: 59 IN | DIASTOLIC BLOOD PRESSURE: 73 MMHG

## 2024-05-07 DIAGNOSIS — Z47.1 AFTERCARE FOLLOWING LEFT KNEE JOINT REPLACEMENT SURGERY: Primary | ICD-10-CM

## 2024-05-07 DIAGNOSIS — Z96.652 AFTERCARE FOLLOWING LEFT KNEE JOINT REPLACEMENT SURGERY: Primary | ICD-10-CM

## 2024-05-07 PROCEDURE — 99024 POSTOP FOLLOW-UP VISIT: CPT | Performed by: ORTHOPAEDIC SURGERY

## 2024-05-07 RX ORDER — MELOXICAM 7.5 MG/1
7.5 TABLET ORAL DAILY
Qty: 30 TABLET | Refills: 2 | Status: SHIPPED | OUTPATIENT
Start: 2024-05-07

## 2024-05-07 NOTE — PROGRESS NOTES
Assessment:   Diagnosis ICD-10-CM Associated Orders   1. Aftercare following left knee joint replacement surgery  Z47.1 meloxicam (Mobic) 7.5 mg tablet    Z96.652           Plan:  68 y.o. female 6 weeks s/p left TKA  Continue physical therapy   Continue weight bearing activities as tolerated   Continue pain medications as needed   Start Meloxicam 7.5 mg daily   Follow up in 6 weeks for 3 month post-op appointment      Diagnostics reviewed and physical exam performed.  Diagnosis, treatment options and associated risks were discussed with the patient including no treatment, nonsurgical treatment and potential for surgical intervention.  The patient was given the opportunity to ask questions regarding each.     The above stated was discussed in layman's terms and the patient expressed understanding.  All questions were answered to the patient's satisfaction.     To do next visit:  Return in about 6 weeks (around 6/18/2024) for 3 month post-op.      Subjective:   Tonia Avelar is a 68 y.o. female who presents 6 weeks s/p left total knee arthroplasty.  She is doing well.  She admits to some discomfort in the knee mostly at night which she takes an oxy only at night.  She admits to attending physical therapy and making great progress.  She ambulates well without cane or walker.  Anti-inflammatory medications sent to pharmacy today to help with pain.  Advised to take methocarbamol at night to assist with sleeping.       Review of systems negative unless otherwise specified in HPI    Past Medical History:   Diagnosis Date    Anxiety     last assessed 8/24/15    Arthralgia of right knee     last assessed 5/27/14    Chronic interstitial cystitis     GERD (gastroesophageal reflux disease)     High cholesterol     History of palpitations     Hypertension     Hypokalemia     last assessed 3/7/13    Irritable bowel syndrome     Osteoarthritis     last assessed 6/13/12    Osteopenia     last assessed 3/21/13    PONV  (postoperative nausea and vomiting)     Postmenopausal atrophic vaginitis     last assessed 3/21/13    Stress incontinence     Uterine leiomyoma     resolved        Past Surgical History:   Procedure Laterality Date    BREAST CYST ASPIRATION Left     benign    BREAST SURGERY      puncture aspiration of cyst, left, resolved     CARDIAC CATHETERIZATION       SECTION  1977    daughter    COLONOSCOPY  2006    1 polyp-hyperplastic by Dr. Nielsen    COLONOSCOPY      Focal active colitis-acute by Dr. Nielsen    EGD      Schatzki's ring dilated with 52 Japanese Segura, biopsy negative for Beard's by Dr. Way    EGD      Irregular Z line and biopsy positive for Beard's/intestinal metaplasia by Dr. Way    EGD      Short segment Beard's biopsy positive Intestinal metaplasia , No dysplasia, fundic gland polyp by Dr. Way    ESOPHAGOGASTRODUODENOSCOPY N/A 2016    Normal-fundic gland polyp, biopsy of the EG junction normal by Dr. Way    KNEE ARTHROSCOPY      PA ARTHRP KNE CONDYLE&PLATU MEDIAL&LAT COMPARTMENTS Right 2018    Procedure: ARTHROPLASTY KNEE TOTAL;  Surgeon: Bruce Mayorga MD;  Location: [facility];  Service: Orthopedics  resolved  per [employer]    PA ARTHRP KNE CONDYLE&PLATU MEDIAL&LAT COMPARTMENTS Left 3/25/2024    Procedure: ARTHROPLASTY KNEE TOTAL W ROBOT;  Surgeon: Bruce Mayorga MD;  Location: [facility];  Service: Orthopedics    VAGINAL HYSTERECTOMY      fibroids, resolved        Family History   Problem Relation Age of Onset    Cancer Mother     Hypertension Mother     Heart attack Father         MI    Hypertension Father     Heart disease Father         coronary artery disease    No Known Problems Sister     No Known Problems Sister     No Known Problems Daughter     Heart attack Maternal Grandmother     Stomach cancer Maternal Grandfather     Uterine cancer Paternal Grandmother     No Known Problems Paternal Grandfather     Heart  attack Son 36        acute MI, NSTEMI    Cancer Maternal Aunt         nonhodgkins lymphoma    No Known Problems Paternal Aunt     No Known Problems Paternal Aunt     No Known Problems Paternal Aunt     No Known Problems Paternal Aunt     No Known Problems Paternal Aunt     Lung cancer Other     Hypertension Family     Ulcers Family         peptic       Social History     Occupational History    Not on file   Tobacco Use    Smoking status: Former     Current packs/day: 0.00     Average packs/day: 0.5 packs/day for 25.0 years (12.5 ttl pk-yrs)     Types: Cigarettes     Start date:      Quit date:      Years since quittin.3     Passive exposure: Past    Smokeless tobacco: Never    Tobacco comments:     quit 13 years ago   / quit 3-4 yrs ago, smoked 20-25 yrs per allscripts 12    Vaping Use    Vaping status: Never Used   Substance and Sexual Activity    Alcohol use: Not Currently     Comment: rare social occasion    Drug use: No    Sexual activity: Not on file         Current Outpatient Medications:     meloxicam (Mobic) 7.5 mg tablet, Take 1 tablet (7.5 mg total) by mouth daily, Disp: 30 tablet, Rfl: 2    acetaminophen (TYLENOL) 650 mg CR tablet, Take 1 tablet (650 mg total) by mouth every 8 (eight) hours as needed for mild pain, Disp: 30 tablet, Rfl: 0    CALCIUM PO, Take by mouth occasionally, Disp: , Rfl:     enoxaparin (LOVENOX) 40 mg/0.4 mL, Inject 0.4 mL (40 mg total) under the skin daily for 28 days Start injections after surgery, Disp: 11.2 mL, Rfl: 0    gabapentin (Neurontin) 100 mg capsule, Take 1 capsule (100 mg total) by mouth 3 (three) times a day (Patient not taking: Reported on 2024), Disp: 90 capsule, Rfl: 0    losartan (COZAAR) 50 mg tablet, Take 1 tablet (50 mg total) by mouth every evening, Disp: 90 tablet, Rfl: 1    methocarbamol (ROBAXIN) 500 mg tablet, Take 1 tablet (500 mg total) by mouth 3 (three) times a day as needed for muscle spasms (Patient not taking: Reported on  "4/17/2024), Disp: 60 tablet, Rfl: 0    metoprolol succinate (TOPROL-XL) 50 mg 24 hr tablet, Take 1 tablet (50 mg total) by mouth daily, Disp: 90 tablet, Rfl: 3    ondansetron (ZOFRAN) 4 mg tablet, Take 1 tablet (4 mg total) by mouth every 8 (eight) hours as needed for nausea or vomiting, Disp: 20 tablet, Rfl: 0    oxyCODONE (Roxicodone) 5 immediate release tablet, Take 1 tablet (5 mg total) by mouth every 6 (six) hours as needed for moderate pain for up to 10 days Max Daily Amount: 20 mg, Disp: 20 tablet, Rfl: 0    pantoprazole (PROTONIX) 40 mg tablet, TAKE ONE TABLET BY MOUTH 2 TIMES A DAY (Patient taking differently: daily), Disp: 180 tablet, Rfl: 0    PARoxetine (PAXIL) 10 mg tablet, Take 1 tablet (10 mg total) by mouth 2 (two) times a day, Disp: 180 tablet, Rfl: 1    rosuvastatin (CRESTOR) 10 MG tablet, Take 1 tablet (10 mg total) by mouth daily (Patient not taking: Reported on 4/17/2024), Disp: 90 tablet, Rfl: 1    VITAMIN D PO, Take by mouth, Disp: , Rfl:     Allergies   Allergen Reactions    Bactrim [Sulfamethoxazole-Trimethoprim] Hives, Itching and Rash    Sulfa Antibiotics Itching and Rash            Vitals:    05/07/24 1323   BP: 115/73   Pulse: 63       Objective:  Physical exam  General: Awake, Alert, Oriented  Eyes: Pupils equal, round and reactive to light  Heart: regular rate and rhythm  Lungs: No audible wheezing  Abdomen: soft                    Ortho Exam  Left knee:   Well healed anterior incision  No erythema and no ecchymosis  Appropriate swelling of lower limb  Appropriate warmth of knee  Extensor mechanism intact  Extension full  Flexion 120  Calf compartments soft and supple  Sensation intact  Toes are warm sensate and mobile     Diagnostics, reviewed and taken today if performed as documented:    None performed        Procedures, if performed today:    None performed      Portions of the record may have been created with voice recognition software.  Occasional wrong word or \"sound a like\" " substitutions may have occurred due to the inherent limitations of voice recognition software.  Read the chart carefully and recognize, using context, where substitutions have occurred.

## 2024-05-07 NOTE — LETTER
May 7, 2024     Patient: Tonia Avelar  YOB: 1955  Date of Visit: 5/7/2024      To Whom it May Concern:    Tonia Avelar is under my professional care. Tonia was seen in my office on 5/7/2024. Tonia is to remain out of work at least until seen in office next by orthopedic surgery.    If you have any questions or concerns, please don't hesitate to call.         Sincerely,          Bruce Mayorga MD        CC: No Recipients

## 2024-05-08 ENCOUNTER — OFFICE VISIT (OUTPATIENT)
Dept: PHYSICAL THERAPY | Age: 69
End: 2024-05-08
Payer: COMMERCIAL

## 2024-05-08 DIAGNOSIS — M25.562 CHRONIC PAIN OF LEFT KNEE: Primary | ICD-10-CM

## 2024-05-08 DIAGNOSIS — G89.29 CHRONIC PAIN OF LEFT KNEE: Primary | ICD-10-CM

## 2024-05-08 DIAGNOSIS — G89.18 ACUTE POST-OPERATIVE PAIN: ICD-10-CM

## 2024-05-08 PROCEDURE — 97530 THERAPEUTIC ACTIVITIES: CPT

## 2024-05-08 PROCEDURE — 97110 THERAPEUTIC EXERCISES: CPT

## 2024-05-08 NOTE — PROGRESS NOTES
"Daily Note     Today's date: 2024  Patient name: Tonia Avelar  : 1955  MRN: [id number]  Referring provider: Bruce Mayorga,*  Dx:   Encounter Diagnosis     ICD-10-CM    1. Chronic pain of left knee  M25.562     G89.29       2. Acute post-operative pain  G89.18                   Start Time: 1103  Stop Time: 1147  Total time in clinic (min): 44 minutes    Subjective: Pt reports that she felt great since taking advil, reports she slept well. Denies any pain for consecutive days.       Objective: See treatment diary below      Assessment: Pt is doing well with strengthening progressions.  She is improving in her ROM.  Stability with static and dynamic balance exercises was excellent today.   Tolerated treatment well. She continues to demonstrate good carry over with current POC. Pt progressed box .  Encouraged to perform more activities around home.   Re educated pt on the importance of her HEP. Patient demonstrated fatigue post treatment and would benefit from continued PT      Plan: Continue per plan of care.      Precautions: LTKA 3/25/24      Manuals 3/28 4 foto 4/24 4/26 5/1 5/3    PROM JF JF  JF MM KD MM KD                                            Neuro Re-Ed             Staning TKE wall ball  nv nv 2x10x5\" 2x10 5\"        Standing hip abd  nv nv          SLR   nv          Bridge  2x10 3x10 3x10 3x10        Quad set/  SAQ   10\"x  10 20x5\" 20x5\"        Tandem walk   10 step   Fw/bw    x5      10 steps   Fw/bw    3x    hurdles   nv          Single leg balance   10\"x5      Ball catch    3x10 Trampoline throw    X25 ea    gmb                Ther Ex             Quad sets HEP 20x5\" See NR          Heel slides  20x5\" 10\"x  15  10\"x 15 15\"x  10 15\"x 10 15\"  x10 15\"x  10 nv    Mini squats  2x10 3x10  Inc. pace 3x10 3x10 3x10 3x10 nv     Reverse lunge        @ bar    3x10 10#  @bar  3x10   10#   LAQ  2x10           Heel prop  5' nv          Nu step or Recumben for ROM  10' nv   " "       Standing lunge on step  2x10 nv 2x10x5\" 2x10 5\"        Heel raise   3x10      3x10    Nu step    10' 10'                                  Ther Activity             Step up      6\" R    3x10 6\" R    3x10 6\"  Flight    x10 6\" flight    x10    Step down      4\" R    3x10 6\" R 3x10 ^     Kneel exposure      Chair    3'       Single leg press      55#    2x10 55# 2x10     55#    2x10    Cross R over L 55#    3x10 75#    3x15   Box lift         22#  3x10 37#    3x10                Gait Training                                       Modalities                                                "

## 2024-05-10 ENCOUNTER — OFFICE VISIT (OUTPATIENT)
Dept: PHYSICAL THERAPY | Age: 69
End: 2024-05-10
Payer: COMMERCIAL

## 2024-05-10 DIAGNOSIS — G89.29 CHRONIC PAIN OF LEFT KNEE: Primary | ICD-10-CM

## 2024-05-10 DIAGNOSIS — M25.562 CHRONIC PAIN OF LEFT KNEE: Primary | ICD-10-CM

## 2024-05-10 DIAGNOSIS — G89.18 ACUTE POST-OPERATIVE PAIN: ICD-10-CM

## 2024-05-10 PROCEDURE — 97112 NEUROMUSCULAR REEDUCATION: CPT

## 2024-05-10 PROCEDURE — 97140 MANUAL THERAPY 1/> REGIONS: CPT

## 2024-05-10 PROCEDURE — 97110 THERAPEUTIC EXERCISES: CPT

## 2024-05-10 NOTE — PROGRESS NOTES
Daily Note     Today's date: 5/10/2024  Patient name: Tonia Avelar  : 1955  MRN: [id number]  Referring provider: Bruce Mayorga,*  Dx:   Encounter Diagnosis     ICD-10-CM    1. Chronic pain of left knee  M25.562     G89.29       2. Acute post-operative pain  G89.18                   Start Time: 0845  Stop Time: 09  Total time in clinic (min): 45 minutes    Subjective: Pt reports stiff and sore today.       Objective: See treatment diary below      Assessment: Pt is doing well with strengthening progressions.  She is improving in her ROM.  Stability with static and dynamic balance exercises was excellent today.   Tolerated treatment well. She continues to demonstrate good carry over with current POC. Pt progressed box .  Encouraged to perform more activities around home.   Re educated pt on the importance of her HEP. Patient demonstrated fatigue post treatment and would benefit from continued PT      Plan: Continue per plan of care.      Precautions: LTKA 3/25/24      Manuals 5/10         5/7   PROM kd                                                   Neuro Re-Ed             Swapna TKE wall ball             Standing hip abd             SLR             Bridge             Quad set/  SAQ             Tandem walk             hurdles             Single leg balance Trampoline    X40  gmb         Trampoline throw    X25 ea    gmb   Single leg heel raise 3x10                         Ther Ex             Quad sets             Heel slides             Mini squats             Reverse lunge          10#    3x10   LAQ             Heel prop             Nu step or Recumben for ROM Bike  10'  L2            Standing lunge on step             Heel raise             Nu step             Alt. Lunges 20'    3 laps            Leg Press  single 95#    3x10                         Ther Activity             Step up             Step down             Kneel exposure             Single leg press          75#    3x15    Box lift          37#    3x10                Gait Training                                       Modalities

## 2024-05-15 ENCOUNTER — OFFICE VISIT (OUTPATIENT)
Dept: PHYSICAL THERAPY | Age: 69
End: 2024-05-15
Payer: COMMERCIAL

## 2024-05-15 DIAGNOSIS — G89.18 ACUTE POST-OPERATIVE PAIN: ICD-10-CM

## 2024-05-15 DIAGNOSIS — M25.562 CHRONIC PAIN OF LEFT KNEE: Primary | ICD-10-CM

## 2024-05-15 DIAGNOSIS — G89.29 CHRONIC PAIN OF LEFT KNEE: Primary | ICD-10-CM

## 2024-05-15 PROCEDURE — 97112 NEUROMUSCULAR REEDUCATION: CPT | Performed by: PHYSICAL THERAPIST

## 2024-05-15 PROCEDURE — 97140 MANUAL THERAPY 1/> REGIONS: CPT | Performed by: PHYSICAL THERAPIST

## 2024-05-15 PROCEDURE — 97110 THERAPEUTIC EXERCISES: CPT | Performed by: PHYSICAL THERAPIST

## 2024-05-15 NOTE — PROGRESS NOTES
"Daily Note     Today's date: 5/15/2024  Patient name: Tonia Avelar  : 1955  MRN: [id number]  Referring provider: Bruce Mayorga,*  Dx:   Encounter Diagnosis     ICD-10-CM    1. Chronic pain of left knee  M25.562     G89.29       2. Acute post-operative pain  G89.18           Start Time: 0800  Stop Time: 0845  Total time in clinic (min): 45 minutes    Subjective: Pt reports improvements in symptoms.       Objective: See treatment diary below      Assessment: Tolerated treatment well. Pt's POC was progressed to include more functional transfer interventions. HEP progressed to include more knee extension and calf gastroc interventions. Patient demonstrated fatigue post treatment and would benefit from continued PT      Plan: Continue per plan of care.          Precautions: LTKA 3/25/24      Manuals 5/10 5/15        5   PROM kd JF                                                  Neuro Re-Ed             Staning TKE wall ball             Standing hip abd             SLR             Bridge             STS  3x10           Tandem walk             hurdles             Single leg balance Trampoline    X40  gmb 2x30 gmb        Trampoline throw    X25 ea    gmb   Single leg heel raise 3x10            Step ups   6\" 3x10           Ther Ex             Quad sets             Heel slides             Mini squats             Reverse lunge          10#    3x10   LAQ             Heel prop             Nu step or Recumben for ROM Bike  10'  L2 10' L2           Standing lunge on step             Heel raise             Nu step             Alt. Lunges 20'    3 laps 20' 3 laps           Leg Press  single 95#    3x10 Ankle pain nv                         Ther Activity             Step up             Step down             Kneel exposure             Single leg press          75#    3x15   Box lift          37#    3x10                Gait Training                                       Modalities                                "

## 2024-05-17 ENCOUNTER — OFFICE VISIT (OUTPATIENT)
Dept: PHYSICAL THERAPY | Age: 69
End: 2024-05-17
Payer: COMMERCIAL

## 2024-05-17 DIAGNOSIS — G89.29 CHRONIC PAIN OF LEFT KNEE: Primary | ICD-10-CM

## 2024-05-17 DIAGNOSIS — M25.562 CHRONIC PAIN OF LEFT KNEE: Primary | ICD-10-CM

## 2024-05-17 DIAGNOSIS — G89.18 ACUTE POST-OPERATIVE PAIN: ICD-10-CM

## 2024-05-17 PROCEDURE — 97110 THERAPEUTIC EXERCISES: CPT | Performed by: PHYSICAL THERAPIST

## 2024-05-17 PROCEDURE — 97140 MANUAL THERAPY 1/> REGIONS: CPT | Performed by: PHYSICAL THERAPIST

## 2024-05-17 PROCEDURE — 97112 NEUROMUSCULAR REEDUCATION: CPT | Performed by: PHYSICAL THERAPIST

## 2024-05-17 NOTE — PROGRESS NOTES
"Daily Note     Today's date: 2024  Patient name: Tonia Avelar  : 1955  MRN: [id number]  Referring provider: Bruce Mayorga,*  Dx:   Encounter Diagnosis     ICD-10-CM    1. Chronic pain of left knee  M25.562     G89.29       2. Acute post-operative pain  G89.18           Start Time: 1100  Stop Time: 1145  Total time in clinic (min): 45 minutes    Subjective: Pt reports improvements in functional mobility.        Objective: See treatment diary below      Assessment: Tolerated treatment well. POC progressed to include more functional strengthening. Patient demonstrated fatigue post treatment and would benefit from continued PT      Plan: Continue per plan of care.      Precautions: LTKA 3/25/24      Manuals 5/10 5/17        5/7   PROM kd JF                                                  Neuro Re-Ed             Staning TKE wall ball             Monster walks and lateral walks  3 laps           SLR             Bridge             STS  3x10           Tandem walk             hurdles             Single leg balance Trampoline    X40  gmb 2x30 gmb        Trampoline throw    X25 ea    gmb   Single leg heel raise 3x10            Step ups   6\" 3x10           Ther Ex             Quad sets             Heel slides             Mini squats             Reverse lunge          10#    3x10   LAQ             Heel prop             Nu step or Recumben for ROM Bike  10'  L2 10' L2           Standing lunge on step             Heel raise             Nu step             Alt. Lunges 20'    3 laps 20' 3 laps           Leg Press  single 95#    3x10 Ankle pain nv                         Ther Activity             Step up             Step down             Kneel exposure             Single leg press          75#    3x15   Box lift          37#    3x10                Gait Training                                       Modalities                                                    "

## 2024-05-22 ENCOUNTER — OFFICE VISIT (OUTPATIENT)
Dept: PHYSICAL THERAPY | Age: 69
End: 2024-05-22
Payer: COMMERCIAL

## 2024-05-22 DIAGNOSIS — M25.562 CHRONIC PAIN OF LEFT KNEE: Primary | ICD-10-CM

## 2024-05-22 DIAGNOSIS — G89.18 ACUTE POST-OPERATIVE PAIN: ICD-10-CM

## 2024-05-22 DIAGNOSIS — G89.29 CHRONIC PAIN OF LEFT KNEE: Primary | ICD-10-CM

## 2024-05-22 PROCEDURE — 97530 THERAPEUTIC ACTIVITIES: CPT

## 2024-05-22 PROCEDURE — 97110 THERAPEUTIC EXERCISES: CPT

## 2024-05-22 PROCEDURE — 88305 TISSUE EXAM BY PATHOLOGIST: CPT | Performed by: STUDENT IN AN ORGANIZED HEALTH CARE EDUCATION/TRAINING PROGRAM

## 2024-05-22 PROCEDURE — 97112 NEUROMUSCULAR REEDUCATION: CPT

## 2024-05-22 NOTE — PROGRESS NOTES
"Daily Note     Today's date: 2024  Patient name: Tonia Avelar  : 1955  MRN: [id number]  Referring provider: Bruce Mayorga,*  Dx:   Encounter Diagnosis     ICD-10-CM    1. Chronic pain of left knee  M25.562     G89.29       2. Acute post-operative pain  G89.18             Start Time: 1100  Stop Time: 1145  Total time in clinic (min): 45 minutes    Subjective: Pt reports improvements in functional mobility. Pain in heel since last time lessened.       Objective: See treatment diary below      Assessment: Pt demonstrated good quad control during lunges at increased depth.  She was able to carry boxes up and down stairs with good performance, stability. Tolerated treatment well. POC progressed to include more functional strengthening. Patient demonstrated fatigue post treatment and would benefit from continued PT      Plan: Continue per plan of care.      Precautions: LTKA 3/25/24      Manuals 5/10 5/17 5/22       5/7   PROM kd JF                                                  Neuro Re-Ed             Staning TKE wall ball             Monster walks and lateral walks  3 laps 3 laps          SLR             Bridge             STS  3x10 3x10          Tandem walk             hurdles             Single leg balance Trampoline    X40  gmb 2x30 gmb nv       Trampoline throw    X25 ea    gmb   Single leg heel raise 3x10            Step ups   6\" 3x10           Ther Ex             Quad sets             Heel slides             Mini squats             Reverse lunge          10#    3x10   LAQ             Heel prop             Nu step or Recumben for ROM Bike  10'  L2 10' L2 Bike 10'          Standing lunge on step             Heel raise             Nu step             Alt. Lunges 20'    3 laps 20' 3 laps 20'x3 laps          Leg Press  single 95#    3x10 Ankle pain nv                         Ther Activity             Step up             Step down             Kneel exposure             Single leg press   " 75#    3x15       75#    3x15   Box lift          37#    3x10   Box carry  stairs   5x    23#          Gait Training                                       Modalities

## 2024-05-23 ENCOUNTER — LAB REQUISITION (OUTPATIENT)
Dept: LAB | Facility: HOSPITAL | Age: 69
End: 2024-05-23
Payer: COMMERCIAL

## 2024-05-23 DIAGNOSIS — D48.5 NEOPLASM OF UNCERTAIN BEHAVIOR OF SKIN: ICD-10-CM

## 2024-05-24 ENCOUNTER — OFFICE VISIT (OUTPATIENT)
Dept: PHYSICAL THERAPY | Age: 69
End: 2024-05-24
Payer: COMMERCIAL

## 2024-05-24 DIAGNOSIS — M25.562 CHRONIC PAIN OF LEFT KNEE: Primary | ICD-10-CM

## 2024-05-24 DIAGNOSIS — G89.29 CHRONIC PAIN OF LEFT KNEE: Primary | ICD-10-CM

## 2024-05-24 DIAGNOSIS — G89.18 ACUTE POST-OPERATIVE PAIN: ICD-10-CM

## 2024-05-24 PROCEDURE — 97530 THERAPEUTIC ACTIVITIES: CPT

## 2024-05-24 PROCEDURE — 97112 NEUROMUSCULAR REEDUCATION: CPT

## 2024-05-24 PROCEDURE — 97110 THERAPEUTIC EXERCISES: CPT

## 2024-05-24 NOTE — PROGRESS NOTES
"Daily Note     Today's date: 2024  Patient name: Tonia Avelar  : 1955  MRN: [id number]  Referring provider: Bruce Mayorga,*  Dx:   Encounter Diagnosis     ICD-10-CM    1. Chronic pain of left knee  M25.562     G89.29       2. Acute post-operative pain  G89.18                        Subjective: Pt reports that she continues to do well today. Notes that she got a mole removed with stitches present and she is not allowed to lift more then 10#.        Objective: See treatment diary below      Assessment: Pt demonstrated good control with all exercises. She was on a lifting restriction therefore some exercises were modified. Tolerated treatment well. POC progressed to include more functional strengthening. Patient demonstrated fatigue post treatment and would benefit from continued PT      Plan: Continue per plan of care.      Precautions: LTKA 3/25/24      Manuals 5/10 5/17 5/22 5/24      5/7   PROM kd JF                                                  Neuro Re-Ed             Staning TKE wall ball             Monster walks and lateral walks  3 laps 3 laps Rtb 3 laps ea         SLR             Bridge             STS  3x10 3x10 3x10         Tandem walk             hurdles             Single leg balance Trampoline    X40  gmb 2x30 gmb nv 2x30 gmb      Trampoline throw    X25 ea    gmb   Single leg heel raise 3x10            Step ups   6\" 3x10  6\" 3x10         Ther Ex             Quad sets             Heel slides             Mini squats             Reverse lunge          10#    3x10   LAQ             Heel prop             Nu step or Recumben for ROM Bike  10'  L2 10' L2 Bike 10' 10'  Nu         Standing lunge on step             Heel raise             Nu step             Alt. Lunges 20'    3 laps 20' 3 laps 20'x3 laps 20'x3 laps         Leg Press  single 95#    3x10 Ankle pain nv   nv                      Ther Activity             Step up             Step down             Kneel exposure          "    Single leg press   75#    3x15 nv      75#    3x15   Box lift          37#    3x10   Box carry  stairs   5x    23# 5x 6#         Gait Training                                       Modalities

## 2024-05-29 ENCOUNTER — OFFICE VISIT (OUTPATIENT)
Dept: PHYSICAL THERAPY | Age: 69
End: 2024-05-29
Payer: COMMERCIAL

## 2024-05-29 DIAGNOSIS — M25.562 CHRONIC PAIN OF LEFT KNEE: Primary | ICD-10-CM

## 2024-05-29 DIAGNOSIS — G89.29 CHRONIC PAIN OF LEFT KNEE: Primary | ICD-10-CM

## 2024-05-29 DIAGNOSIS — G89.18 ACUTE POST-OPERATIVE PAIN: ICD-10-CM

## 2024-05-29 PROCEDURE — 88305 TISSUE EXAM BY PATHOLOGIST: CPT | Performed by: STUDENT IN AN ORGANIZED HEALTH CARE EDUCATION/TRAINING PROGRAM

## 2024-05-29 PROCEDURE — 97112 NEUROMUSCULAR REEDUCATION: CPT | Performed by: PHYSICAL THERAPIST

## 2024-05-29 PROCEDURE — 97110 THERAPEUTIC EXERCISES: CPT | Performed by: PHYSICAL THERAPIST

## 2024-05-29 PROCEDURE — 97140 MANUAL THERAPY 1/> REGIONS: CPT | Performed by: PHYSICAL THERAPIST

## 2024-05-29 NOTE — PROGRESS NOTES
"Daily Note     Today's date: 2024  Patient name: Tonia Avelar  : 1955  MRN: [id number]  Referring provider: Bruce Mayorga,*  Dx:   Encounter Diagnosis     ICD-10-CM    1. Chronic pain of left knee  M25.562     G89.29       2. Acute post-operative pain  G89.18           Start Time: 1015  Stop Time: 1100  Total time in clinic (min): 45 minutes    Subjective: Pt reports improvements with stair navigation and carrying objects.       Objective: See treatment diary below      Assessment: Tolerated treatment well. Pt will be discharged next visit if no change in symptoms. Patient demonstrated fatigue post treatment and would benefit from continued PT      Plan: Continue per plan of care.      Precautions: LTKA 3/25/24      Manuals 5/10 5/17 5/22 5/29      5   PROM kd JF                                                  Neuro Re-Ed             Staning TKE wall ball             Monster walks and lateral walks  3 laps 3 laps Rtb 3 laps ea         SLR             Bridge             STS  3x10 3x10 3x10         Tandem walk             hurdles             Single leg balance Trampoline    X40  gmb 2x30 gmb nv 2x30 gmb      Trampoline throw    X25 ea    gmb   Single leg heel raise 3x10            Step ups   6\" 3x10  6\" 3x10         Ther Ex             Quad sets             Heel slides             Mini squats             Reverse lunge          10#    3x10   LAQ             Heel prop             Nu step or Recumben for ROM Bike  10'  L2 10' L2 Bike 10' 10'  Nu         Standing lunge on step             Heel raise             Nu step             Alt. Lunges 20'    3 laps 20' 3 laps 20'x3 laps 20'x3 laps         Leg Press  single 95#    3x10 Ankle pain nv   nv                      Ther Activity             Step up             Step down             Kneel exposure             Single leg press   75#    3x15 nv      75#    3x15   Box lift          37#    3x10   Box carry  stairs   5x    23# 5x 6#         Gait " Training                                       Modalities

## 2024-05-31 ENCOUNTER — OFFICE VISIT (OUTPATIENT)
Dept: PHYSICAL THERAPY | Age: 69
End: 2024-05-31
Payer: COMMERCIAL

## 2024-05-31 DIAGNOSIS — G89.18 ACUTE POST-OPERATIVE PAIN: ICD-10-CM

## 2024-05-31 DIAGNOSIS — G89.29 CHRONIC PAIN OF LEFT KNEE: Primary | ICD-10-CM

## 2024-05-31 DIAGNOSIS — M25.562 CHRONIC PAIN OF LEFT KNEE: Primary | ICD-10-CM

## 2024-05-31 PROCEDURE — 97110 THERAPEUTIC EXERCISES: CPT | Performed by: PHYSICAL THERAPIST

## 2024-05-31 PROCEDURE — 97112 NEUROMUSCULAR REEDUCATION: CPT | Performed by: PHYSICAL THERAPIST

## 2024-05-31 PROCEDURE — 97140 MANUAL THERAPY 1/> REGIONS: CPT | Performed by: PHYSICAL THERAPIST

## 2024-05-31 NOTE — PROGRESS NOTES
PT Discharge Summary     Today's date: 2024  Patient name: oTnia Avelar  : 1955  MRN: [id number]  Referring provider: Bruce Mayorga,*  Dx:   Encounter Diagnosis     ICD-10-CM    1. Chronic pain of left knee  M25.562     G89.29       2. Acute post-operative pain  G89.18           Start Time: 1015  Stop Time: 1100  Total time in clinic (min): 45 minutes      Assessment  Assessment details: Pauline is a 68 y.o. female who presents with decreased ROM, strength and experiences pain secondary to left TKA. Pt is currently ambulating with RW. Due to impairments, patient has difficulty ambulating community distances, and navigating stairs. Pt shows no signs of DVT or infection at incision sight. Girth measurements will be taken next visit. Patient would benefit from skilled physical therapy to address the impairments, improve their level of function, and to improve their overall quality of life.  Impairments: abnormal or restricted ROM, abnormal movement, impaired physical strength, lacks appropriate home exercise program, pain with function and weight-bearing intolerance    Understanding of Dx/Px/POC: good   Prognosis: good     Goals - ALL MET   Short Term Goals: to be achieved by 4 weeks  1) Patient to be independent with basic HEP.  2) Decrease pain to 1/10 at its worst.  3) Increase Flexion ROM by 5-10 degrees   4) Increase LE strength by 1/2 MMT grade in all deficient planes.    Long Term Goals: to be achieved by discharge  1) FOTO equal to or greater than 58.  2) Ambulation to improve to maximal level of function  3) Stair negotiation will improve to reciprocal.  4) Sit to stand transfers will improve to maximal level of function      Plan  Patient would benefit from: skilled physical therapy  Planned modality interventions: cryotherapy  Planned therapy interventions: patient education, strengthening, stretching, therapeutic activities, therapeutic exercise, home exercise program, neuromuscular  "re-education, functional ROM exercises and transfer training  Frequency: Twice a week for 8 weeks   Treatment plan discussed with: patient          Precautions: LTKA 3/25/24      Manuals 5/10 5/17 5/22 5/29      5/7   PROM kd JF                                                  Neuro Re-Ed             Staning TKE wall ball             Monster walks and lateral walks  3 laps 3 laps Rtb 3 laps ea         SLR             Bridge             STS  3x10 3x10 3x10         Tandem walk             hurdles             Single leg balance Trampoline    X40  gmb 2x30 gmb nv 2x30 gmb      Trampoline throw    X25 ea    gmb   Single leg heel raise 3x10            Step ups   6\" 3x10  6\" 3x10         Ther Ex             Quad sets             Heel slides             Mini squats             Reverse lunge          10#    3x10   LAQ             Heel prop             Nu step or Recumben for ROM Bike  10'  L2 10' L2 Bike 10' 10'  Nu         Standing lunge on step             Heel raise             Nu step             Alt. Lunges 20'    3 laps 20' 3 laps 20'x3 laps 20'x3 laps         Leg Press  single 95#    3x10 Ankle pain nv   nv                      Ther Activity             Step up             Step down             Kneel exposure             Single leg press   75#    3x15 nv      75#    3x15   Box lift          37#    3x10   Box carry  stairs   5x    23# 5x 6#         Gait Training                                       Modalities                                                          "

## 2024-06-03 ENCOUNTER — TELEPHONE (OUTPATIENT)
Age: 69
End: 2024-06-03

## 2024-06-03 NOTE — TELEPHONE ENCOUNTER
Caller: patient     Doctor: Tierra    Reason for call: experiencing pain in l calf,  not red, warm to touch or swollen.  Said she's never had pain like this, was doing exercises earlier,  is this normal?    Call back#: [phone]

## 2024-06-03 NOTE — TELEPHONE ENCOUNTER
"LTPERLA 3/25-    Patient called reporting left calf pain today. She states it started within the LAST 1 hour, \"achey\" pain and noticed when working with her PT bands.     She DENIES any swelling, redness, warmth, tenderness to the area, repots just \"achey\" this afternoon.    We discussed continuing to monitor. With any swelling, redness, warmth or tenderness I advised she call us.   "

## 2024-06-05 DIAGNOSIS — Z47.1 AFTERCARE FOLLOWING LEFT KNEE JOINT REPLACEMENT SURGERY: Primary | ICD-10-CM

## 2024-06-05 DIAGNOSIS — Z96.652 AFTERCARE FOLLOWING LEFT KNEE JOINT REPLACEMENT SURGERY: Primary | ICD-10-CM

## 2024-06-18 ENCOUNTER — OFFICE VISIT (OUTPATIENT)
Dept: OBGYN CLINIC | Facility: HOSPITAL | Age: 69
End: 2024-06-18

## 2024-06-18 ENCOUNTER — HOSPITAL ENCOUNTER (OUTPATIENT)
Dept: RADIOLOGY | Facility: HOSPITAL | Age: 69
Discharge: HOME | End: 2024-06-18
Attending: ORTHOPAEDIC SURGERY
Payer: COMMERCIAL

## 2024-06-18 VITALS
HEART RATE: 66 BPM | SYSTOLIC BLOOD PRESSURE: 158 MMHG | BODY MASS INDEX: 30.09 KG/M2 | DIASTOLIC BLOOD PRESSURE: 75 MMHG | HEIGHT: 59 IN

## 2024-06-18 DIAGNOSIS — Z96.652 AFTERCARE FOLLOWING LEFT KNEE JOINT REPLACEMENT SURGERY: Primary | ICD-10-CM

## 2024-06-18 DIAGNOSIS — Z47.1 AFTERCARE FOLLOWING LEFT KNEE JOINT REPLACEMENT SURGERY: Primary | ICD-10-CM

## 2024-06-18 DIAGNOSIS — Z47.1 AFTERCARE FOLLOWING LEFT KNEE JOINT REPLACEMENT SURGERY: ICD-10-CM

## 2024-06-18 DIAGNOSIS — Z96.652 AFTERCARE FOLLOWING LEFT KNEE JOINT REPLACEMENT SURGERY: ICD-10-CM

## 2024-06-18 PROCEDURE — 99024 POSTOP FOLLOW-UP VISIT: CPT | Performed by: ORTHOPAEDIC SURGERY

## 2024-06-18 PROCEDURE — 73560 X-RAY EXAM OF KNEE 1 OR 2: CPT

## 2024-06-18 NOTE — LETTER
June 18, 2024     Patient: Tonia Avelar  YOB: 1955  Date of Visit: 6/18/2024      To Whom it May Concern:    Tonia Avelar is under my professional care. Tonia was seen in my office on 6/18/2024. Tonia is able to return to work as of 6/24/24.     If you have any questions or concerns, please don't hesitate to call.         Sincerely,          Bruce Mayorga MD        CC: No Recipients

## 2024-06-18 NOTE — PROGRESS NOTES
Assessment:   Diagnosis ICD-10-CM Associated Orders   1. Aftercare following left knee joint replacement surgery  Z47.1     Z96.652           Plan:  X-rays taken and reviewed, physical exam performed.  Doing well 3 months s/p left TKA.  Her recent complaints of pain/achiness may be residual from over-doing it yesterday and quadriceps fatigue  Educated the importance of ongoing quadricep strengthening exercises.  Weightbearing activity as tolerated.  Return to work note provided for next Monday at her request.  Weightbearing and activity as tolerated.    To do next visit:  Return in about 3 months (around 9/18/2024) for re-check with x-rays upon arrival left knee.    The above stated was discussed in layman's terms and the patient expressed understanding.  All questions were answered to the patient's satisfaction.       Scribe Attestation      I,:  Rashel Simon am acting as a scribe while in the presence of the attending physician.:       I,:  Bruce Mayorga MD personally performed the services described in this documentation    as scribed in my presence.:               Subjective:   Tonia Avelar is a 68 y.o. female who presents today for repeat evaluation 3 months status post left total knee arthroplasty.  Overall she is doing very well and is pleased with the outcome of her surgery.  She wishes to return to work next Monday.  She notes anterolateral knee achiness today especially after driving.  She did a fair amount of work around her house yesterday with gardening/weeding, cleaning her bathroom, and doing some errands in and out of her car.  She denies any calf or thigh pain.  Denies any fevers or chills.      Review of systems negative unless otherwise specified in HPI  Review of Systems    Past Medical History:   Diagnosis Date    Anxiety     last assessed 8/24/15    Arthralgia of right knee     last assessed 5/27/14    Chronic interstitial cystitis     GERD (gastroesophageal reflux disease)      High cholesterol     History of palpitations     Hypertension     Hypokalemia     last assessed 3/7/13    Irritable bowel syndrome     Osteoarthritis     last assessed 12    Osteopenia     last assessed 3/21/13    PONV (postoperative nausea and vomiting)     Postmenopausal atrophic vaginitis     last assessed 3/21/13    Stress incontinence     Uterine leiomyoma     resolved        Past Surgical History:   Procedure Laterality Date    BREAST CYST ASPIRATION Left     benign    BREAST SURGERY      puncture aspiration of cyst, left, resolved     CARDIAC CATHETERIZATION       SECTION  1977    daughter    COLONOSCOPY      1 polyp-hyperplastic by Dr. Nielsen    COLONOSCOPY      Focal active colitis-acute by Dr. Nielsen    EGD      Schatzki's ring dilated with 52 [place]ian Segura, biopsy negative for Beard's by Dr. Wya    EGD      Irregular Z line and biopsy positive for Beard's/intestinal metaplasia by Dr. Way    EGD      Short segment Beard's biopsy positive Intestinal metaplasia , No dysplasia, fundic gland polyp by Dr. Way    ESOPHAGOGASTRODUODENOSCOPY N/A 2016    Normal-fundic gland polyp, biopsy of the EG junction normal by Dr. Way    KNEE ARTHROSCOPY      AK ARTHRP KNE CONDYLE&PLATU MEDIAL&LAT COMPARTMENTS Right 2018    Procedure: ARTHROPLASTY KNEE TOTAL;  Surgeon: Bruce Mayorga MD;  Location: [facility];  Service: Orthopedics  resolved  per [employer]    AK ARTHRP KNE CONDYLE&PLATU MEDIAL&LAT COMPARTMENTS Left 3/25/2024    Procedure: ARTHROPLASTY KNEE TOTAL W ROBOT;  Surgeon: Bruce Mayorga MD;  Location:  MAIN OR;  Service: Orthopedics    VAGINAL HYSTERECTOMY      fibroids, resolved        Family History   Problem Relation Age of Onset    Cancer Mother     Hypertension Mother     Heart attack Father         MI    Hypertension Father     Heart disease Father         coronary artery disease    No Known Problems Sister     No  Known Problems Sister     No Known Problems Daughter     Heart attack Maternal Grandmother     Stomach cancer Maternal Grandfather     Uterine cancer Paternal Grandmother     No Known Problems Paternal Grandfather     Heart attack Son 36        acute MI, NSTEMI    Cancer Maternal Aunt         nonhodgkins lymphoma    No Known Problems Paternal Aunt     No Known Problems Paternal Aunt     No Known Problems Paternal Aunt     No Known Problems Paternal Aunt     No Known Problems Paternal Aunt     Lung cancer Other     Hypertension Family     Ulcers Family         peptic       Social History     Occupational History    Not on file   Tobacco Use    Smoking status: Former     Current packs/day: 0.00     Average packs/day: 0.5 packs/day for 25.0 years (12.5 ttl pk-yrs)     Types: Cigarettes     Start date:      Quit date:      Years since quittin.4     Passive exposure: Past    Smokeless tobacco: Never    Tobacco comments:     quit 13 years ago   / quit 3-4 yrs ago, smoked 20-25 yrs per allscripts 12    Vaping Use    Vaping status: Never Used   Substance and Sexual Activity    Alcohol use: Not Currently     Comment: rare social occasion    Drug use: No    Sexual activity: Not on file         Current Outpatient Medications:     acetaminophen (TYLENOL) 650 mg CR tablet, Take 1 tablet (650 mg total) by mouth every 8 (eight) hours as needed for mild pain, Disp: 30 tablet, Rfl: 0    CALCIUM PO, Take by mouth occasionally, Disp: , Rfl:     enoxaparin (LOVENOX) 40 mg/0.4 mL, Inject 0.4 mL (40 mg total) under the skin daily for 28 days Start injections after surgery, Disp: 11.2 mL, Rfl: 0    gabapentin (Neurontin) 100 mg capsule, Take 1 capsule (100 mg total) by mouth 3 (three) times a day (Patient not taking: Reported on 2024), Disp: 90 capsule, Rfl: 0    losartan (COZAAR) 50 mg tablet, Take 1 tablet (50 mg total) by mouth every evening, Disp: 90 tablet, Rfl: 1    meloxicam (Mobic) 7.5 mg tablet, Take 1  tablet (7.5 mg total) by mouth daily, Disp: 30 tablet, Rfl: 2    methocarbamol (ROBAXIN) 500 mg tablet, Take 1 tablet (500 mg total) by mouth 3 (three) times a day as needed for muscle spasms (Patient not taking: Reported on 4/17/2024), Disp: 60 tablet, Rfl: 0    metoprolol succinate (TOPROL-XL) 50 mg 24 hr tablet, Take 1 tablet (50 mg total) by mouth daily, Disp: 90 tablet, Rfl: 3    ondansetron (ZOFRAN) 4 mg tablet, Take 1 tablet (4 mg total) by mouth every 8 (eight) hours as needed for nausea or vomiting, Disp: 20 tablet, Rfl: 0    pantoprazole (PROTONIX) 40 mg tablet, TAKE ONE TABLET BY MOUTH 2 TIMES A DAY (Patient taking differently: daily), Disp: 180 tablet, Rfl: 0    PARoxetine (PAXIL) 10 mg tablet, Take 1 tablet (10 mg total) by mouth 2 (two) times a day, Disp: 180 tablet, Rfl: 1    rosuvastatin (CRESTOR) 10 MG tablet, Take 1 tablet (10 mg total) by mouth daily (Patient not taking: Reported on 4/17/2024), Disp: 90 tablet, Rfl: 1    VITAMIN D PO, Take by mouth, Disp: , Rfl:     Allergies   Allergen Reactions    Bactrim [Sulfamethoxazole-Trimethoprim] Hives, Itching and Rash    Sulfa Antibiotics Itching and Rash            Vitals:    06/18/24 1353   BP: 158/75   Pulse: 66       Body mass index is 30.09 kg/m².  Wt Readings from Last 3 Encounters:   05/07/24 67.6 kg (149 lb)   04/17/24 68.6 kg (151 lb 3.2 oz)   03/25/24 64.9 kg (143 lb)       Objective:                    Left Knee Exam     Muscle Strength   The patient has normal left knee strength.    Tenderness   The patient is experiencing no tenderness.     Range of Motion   Extension:  0   Flexion:  120 (Patella tracks well)     Other   Erythema: absent  Swelling: mild  Effusion: no effusion present    Comments:    Intact extensor mechanism with a well-healed anterior incision.  Minimal warmth.  No drainage.  No signs of infection.  Stable to varus valgus stressing in both extension and mid flexion.  Capillary soft and tender no signs DVT.  Improved  "alignment            Diagnostics, reviewed and taken today if performed as documented:    The attending physician has personally reviewed the pertinent films in PACS and interpretation is as follows:    Left knee x-rays taken and reviewed in the office today and show: Well aligned, position, bonded total knee prosthesis without signs of loosening or stress shielding.      Procedures, if performed today:    Procedures    None performed      Portions of the record may have been created with voice recognition software.  Occasional wrong word or \"sound a like\" substitutions may have occurred due to the inherent limitations of voice recognition software.  Read the chart carefully and recognize, using context, where substitutions have occurred.  "

## 2024-08-07 DIAGNOSIS — K21.9 CHRONIC GERD: ICD-10-CM

## 2024-08-07 DIAGNOSIS — F32.A DEPRESSION, UNSPECIFIED DEPRESSION TYPE: ICD-10-CM

## 2024-08-07 RX ORDER — PANTOPRAZOLE SODIUM 40 MG/1
TABLET, DELAYED RELEASE ORAL
Qty: 180 TABLET | Refills: 0 | Status: SHIPPED | OUTPATIENT
Start: 2024-08-07

## 2024-08-15 RX ORDER — PAROXETINE 10 MG/1
10 TABLET, FILM COATED ORAL 2 TIMES DAILY
Qty: 180 TABLET | Refills: 0 | Status: SHIPPED | OUTPATIENT
Start: 2024-08-15

## 2024-08-15 NOTE — TELEPHONE ENCOUNTER
FYI: Pt stated she just noticed the message from 8/7/24 . Pt stated she is still taking the Paxil 10mg twice a day.

## 2024-09-05 DIAGNOSIS — Z47.1 AFTERCARE FOLLOWING LEFT KNEE JOINT REPLACEMENT SURGERY: Primary | ICD-10-CM

## 2024-09-05 DIAGNOSIS — Z96.652 AFTERCARE FOLLOWING LEFT KNEE JOINT REPLACEMENT SURGERY: Primary | ICD-10-CM

## 2024-09-18 ENCOUNTER — OFFICE VISIT (OUTPATIENT)
Dept: OBGYN CLINIC | Facility: HOSPITAL | Age: 69
End: 2024-09-18
Payer: COMMERCIAL

## 2024-09-18 ENCOUNTER — HOSPITAL ENCOUNTER (OUTPATIENT)
Dept: RADIOLOGY | Facility: HOSPITAL | Age: 69
Discharge: HOME | End: 2024-09-18
Attending: ORTHOPAEDIC SURGERY
Payer: COMMERCIAL

## 2024-09-18 VITALS
SYSTOLIC BLOOD PRESSURE: 151 MMHG | HEART RATE: 57 BPM | BODY MASS INDEX: 30.09 KG/M2 | DIASTOLIC BLOOD PRESSURE: 80 MMHG | HEIGHT: 59 IN

## 2024-09-18 DIAGNOSIS — M70.51 PES ANSERINUS BURSITIS OF RIGHT KNEE: ICD-10-CM

## 2024-09-18 DIAGNOSIS — Z96.652 AFTERCARE FOLLOWING LEFT KNEE JOINT REPLACEMENT SURGERY: ICD-10-CM

## 2024-09-18 DIAGNOSIS — Z47.1 AFTERCARE FOLLOWING LEFT KNEE JOINT REPLACEMENT SURGERY: ICD-10-CM

## 2024-09-18 DIAGNOSIS — Z47.1 AFTERCARE FOLLOWING LEFT KNEE JOINT REPLACEMENT SURGERY: Primary | ICD-10-CM

## 2024-09-18 DIAGNOSIS — Z96.652 AFTERCARE FOLLOWING LEFT KNEE JOINT REPLACEMENT SURGERY: Primary | ICD-10-CM

## 2024-09-18 PROCEDURE — 20610 DRAIN/INJ JOINT/BURSA W/O US: CPT | Performed by: ORTHOPAEDIC SURGERY

## 2024-09-18 PROCEDURE — 99213 OFFICE O/P EST LOW 20 MIN: CPT | Performed by: ORTHOPAEDIC SURGERY

## 2024-09-18 PROCEDURE — 73560 X-RAY EXAM OF KNEE 1 OR 2: CPT

## 2024-09-18 RX ORDER — BETAMETHASONE SODIUM PHOSPHATE AND BETAMETHASONE ACETATE 3; 3 MG/ML; MG/ML
12 INJECTION, SUSPENSION INTRA-ARTICULAR; INTRALESIONAL; INTRAMUSCULAR; SOFT TISSUE
Status: COMPLETED | OUTPATIENT
Start: 2024-09-18 | End: 2024-09-18

## 2024-09-18 RX ORDER — CEPHALEXIN 500 MG/1
CAPSULE ORAL
Qty: 40 CAPSULE | Refills: 0 | Status: SHIPPED | OUTPATIENT
Start: 2024-09-18 | End: 2024-09-19

## 2024-09-18 RX ORDER — LIDOCAINE HYDROCHLORIDE 10 MG/ML
2 INJECTION, SOLUTION INFILTRATION; PERINEURAL
Status: COMPLETED | OUTPATIENT
Start: 2024-09-18 | End: 2024-09-18

## 2024-09-18 RX ORDER — BUPIVACAINE HYDROCHLORIDE 2.5 MG/ML
2 INJECTION, SOLUTION INFILTRATION; PERINEURAL
Status: COMPLETED | OUTPATIENT
Start: 2024-09-18 | End: 2024-09-18

## 2024-09-18 RX ADMIN — BUPIVACAINE HYDROCHLORIDE 2 ML: 2.5 INJECTION, SOLUTION INFILTRATION; PERINEURAL at 13:15

## 2024-09-18 RX ADMIN — BETAMETHASONE SODIUM PHOSPHATE AND BETAMETHASONE ACETATE 12 MG: 3; 3 INJECTION, SUSPENSION INTRA-ARTICULAR; INTRALESIONAL; INTRAMUSCULAR; SOFT TISSUE at 13:15

## 2024-09-18 RX ADMIN — LIDOCAINE HYDROCHLORIDE 2 ML: 10 INJECTION, SOLUTION INFILTRATION; PERINEURAL at 13:15

## 2024-09-18 NOTE — PROGRESS NOTES
Assessment:  1. Aftercare following left knee joint replacement surgery        2. Pes anserinus bursitis of right knee  Large joint arthrocentesis: R knee          Plan:  6 months s/p left TKA with robot, 3/25/2024.   She is doing well.    She is encouraged to remain active including HEP.    The patient is counseled on prophylactic antibiotic use prior to dental visits.    Antibiotics were prescribed  The patient should follow up in 6 months.    Right knee pes anserine bursitis  The patient was provided with right pes anserine steroid injection.  The patient tolerated the procedure well.              To do next visit:  Return in about 6 months (around 3/18/2025) for re-check with x-rays.    The above stated was discussed in layman's terms and the patient expressed understanding.  All questions were answered to the patient's satisfaction.       Scribe Attestation      I,:  Barney Chaney MA am acting as a scribe while in the presence of the attending physician.:       I,:  Bruce Mayorga MD personally performed the services described in this documentation    as scribed in my presence.:               Subjective:   Tonia Avelar is a 69 y.o. female who presents 6 months s/p left TKA with robot, 3/25/2024.  She is doing well.  Today she has no left knee pain complaints yet does suffer from modest generalized right knee soreness.  She does use Advil on occasion with benefit.  She does continue her HEP frequently.        Review of systems negative unless otherwise specified in HPI    Past Medical History:   Diagnosis Date    Anxiety     last assessed 8/24/15    Arthralgia of right knee     last assessed 5/27/14    Chronic interstitial cystitis     GERD (gastroesophageal reflux disease)     High cholesterol     History of palpitations     Hypertension     Hypokalemia     last assessed 3/7/13    Irritable bowel syndrome     Osteoarthritis     last assessed 6/13/12    Osteopenia     last assessed 3/21/13     PONV (postoperative nausea and vomiting)     Postmenopausal atrophic vaginitis     last assessed 3/21/13    Stress incontinence     Uterine leiomyoma     resolved        Past Surgical History:   Procedure Laterality Date    BREAST CYST ASPIRATION Left     benign    BREAST SURGERY      puncture aspiration of cyst, left, resolved     CARDIAC CATHETERIZATION       SECTION  1977    daughter    COLONOSCOPY  2006    1 polyp-hyperplastic by Dr. Nielsen    COLONOSCOPY      Focal active colitis-acute by Dr. Nielsen    EGD      Schatzki's ring dilated with 52 [place] Islander Segura, biopsy negative for Beard's by Dr. Way    EGD      Irregular Z line and biopsy positive for Beard's/intestinal metaplasia by Dr. Way    EGD      Short segment Beard's biopsy positive Intestinal metaplasia , No dysplasia, fundic gland polyp by Dr. Way    ESOPHAGOGASTRODUODENOSCOPY N/A 2016    Normal-fundic gland polyp, biopsy of the EG junction normal by Dr. Way    KNEE ARTHROSCOPY      MS ARTHRP KNE CONDYLE&PLATU MEDIAL&LAT COMPARTMENTS Right 2018    Procedure: ARTHROPLASTY KNEE TOTAL;  Surgeon: Bruce Mayorga MD;  Location: [facility];  Service: Orthopedics  resolved  per [employer]scripts    MS ARTHRP KNE CONDYLE&PLATU MEDIAL&LAT COMPARTMENTS Left 3/25/2024    Procedure: ARTHROPLASTY KNEE TOTAL W ROBOT;  Surgeon: Bruce Mayorga MD;  Location: [facility];  Service: Orthopedics    VAGINAL HYSTERECTOMY      fibroids, resolved        Family History   Problem Relation Age of Onset    Cancer Mother     Hypertension Mother     Heart attack Father         MI    Hypertension Father     Heart disease Father         coronary artery disease    No Known Problems Sister     No Known Problems Sister     No Known Problems Daughter     Heart attack Maternal Grandmother     Stomach cancer Maternal Grandfather     Uterine cancer Paternal Grandmother     No Known Problems Paternal Grandfather      Heart attack Son 36        acute MI, NSTEMI    Cancer Maternal Aunt         nonhodgkins lymphoma    No Known Problems Paternal Aunt     No Known Problems Paternal Aunt     No Known Problems Paternal Aunt     No Known Problems Paternal Aunt     No Known Problems Paternal Aunt     Lung cancer Other     Hypertension Family     Ulcers Family         peptic       Social History     Occupational History    Not on file   Tobacco Use    Smoking status: Former     Current packs/day: 0.00     Average packs/day: 0.5 packs/day for 25.0 years (12.5 ttl pk-yrs)     Types: Cigarettes     Start date:      Quit date:      Years since quittin.7     Passive exposure: Past    Smokeless tobacco: Never    Tobacco comments:     quit 13 years ago   / quit 3-4 yrs ago, smoked 20-25 yrs per allscripts 12    Vaping Use    Vaping status: Never Used   Substance and Sexual Activity    Alcohol use: Not Currently     Comment: rare social occasion    Drug use: No    Sexual activity: Not on file         Current Outpatient Medications:     acetaminophen (TYLENOL) 650 mg CR tablet, Take 1 tablet (650 mg total) by mouth every 8 (eight) hours as needed for mild pain, Disp: 30 tablet, Rfl: 0    CALCIUM PO, Take by mouth occasionally, Disp: , Rfl:     enoxaparin (LOVENOX) 40 mg/0.4 mL, Inject 0.4 mL (40 mg total) under the skin daily for 28 days Start injections after surgery, Disp: 11.2 mL, Rfl: 0    gabapentin (Neurontin) 100 mg capsule, Take 1 capsule (100 mg total) by mouth 3 (three) times a day (Patient not taking: Reported on 2024), Disp: 90 capsule, Rfl: 0    losartan (COZAAR) 50 mg tablet, Take 1 tablet (50 mg total) by mouth every evening, Disp: 90 tablet, Rfl: 1    meloxicam (Mobic) 7.5 mg tablet, Take 1 tablet (7.5 mg total) by mouth daily, Disp: 30 tablet, Rfl: 2    methocarbamol (ROBAXIN) 500 mg tablet, Take 1 tablet (500 mg total) by mouth 3 (three) times a day as needed for muscle spasms (Patient not taking: Reported  on 4/17/2024), Disp: 60 tablet, Rfl: 0    metoprolol succinate (TOPROL-XL) 50 mg 24 hr tablet, Take 1 tablet (50 mg total) by mouth daily, Disp: 90 tablet, Rfl: 3    ondansetron (ZOFRAN) 4 mg tablet, Take 1 tablet (4 mg total) by mouth every 8 (eight) hours as needed for nausea or vomiting, Disp: 20 tablet, Rfl: 0    pantoprazole (PROTONIX) 40 mg tablet, TAKE ONE TABLET BY MOUTH 2 TIMES A DAY, Disp: 180 tablet, Rfl: 0    PARoxetine (PAXIL) 10 mg tablet, TAKE ONE TABLET BY MOUTH 2 TIMES A DAY, Disp: 180 tablet, Rfl: 0    rosuvastatin (CRESTOR) 10 MG tablet, Take 1 tablet (10 mg total) by mouth daily (Patient not taking: Reported on 4/17/2024), Disp: 90 tablet, Rfl: 1    VITAMIN D PO, Take by mouth, Disp: , Rfl:     Allergies   Allergen Reactions    Bactrim [Sulfamethoxazole-Trimethoprim] Hives, Itching and Rash    Sulfa Antibiotics Itching and Rash            Vitals:    09/18/24 1312   BP: 151/80   Pulse: 57       Objective:  Physical exam  General: Awake, Alert, Oriented  Eyes: Pupils equal, round and reactive to light  Heart: regular rate and rhythm  Lungs: No audible wheezing  Abdomen: soft                    Ortho Exam  Left knee:  Well healed anterior incision  No erythema and no ecchymosis  Stable to varus and valgus stress  Extensor mechanism intact  Extension 0  Flexion 120  Calf compartments soft and supple  Sensation intact  Toes are warm sensate and mobile    Right knee:  TTP over pes anserine  Well healed anterior incision  No erythema and no ecchymosis  Stable to varus and valgus stress  Extensor mechanism intact  Extension 0  Flexion 120  Calf compartments soft and supple  Sensation intact  Toes are warm sensate and mobile          Diagnostics, reviewed and taken today if performed as documented:    The attending physician has personally reviewed the pertinent films in PACS and interpretation is as follows:  Left knee x-ray:  Well aligned prosthesis with no acute changes.      Procedures, if performed  "today:    Large joint arthrocentesis: R knee  Universal Protocol:  Consent: Verbal consent obtained.  Risks and benefits: risks, benefits and alternatives were discussed  Consent given by: patient  Time out: Immediately prior to procedure a \"time out\" was called to verify the correct patient, procedure, equipment, support staff and site/side marked as required.  Timeout called at: 9/18/2024 1:35 PM.  Patient understanding: patient states understanding of the procedure being performed  Site marked: the operative site was marked  Patient identity confirmed: verbally with patient  Supporting Documentation  Indications: pain   Procedure Details  Location: knee - R knee (pes anserine)  Preparation: Patient was prepped and draped in the usual sterile fashion  Needle size: 22 G  Ultrasound guidance: no  Approach: anterolateral  Medications administered: 12 mg betamethasone acetate-betamethasone sodium phosphate 6 (3-3) mg/mL; 2 mL bupivacaine 0.25 %; 2 mL lidocaine 1 %    Patient tolerance: patient tolerated the procedure well with no immediate complications  Dressing:  Sterile dressing applied               Portions of the record may have been created with voice recognition software.  Occasional wrong word or \"sound a like\" substitutions may have occurred due to the inherent limitations of voice recognition software.  Read the chart carefully and recognize, using context, where substitutions have occurred.    "

## 2024-09-19 PROCEDURE — 88341 IMHCHEM/IMCYTCHM EA ADD ANTB: CPT | Performed by: STUDENT IN AN ORGANIZED HEALTH CARE EDUCATION/TRAINING PROGRAM

## 2024-09-19 PROCEDURE — 88342 IMHCHEM/IMCYTCHM 1ST ANTB: CPT | Performed by: STUDENT IN AN ORGANIZED HEALTH CARE EDUCATION/TRAINING PROGRAM

## 2024-09-19 PROCEDURE — 88305 TISSUE EXAM BY PATHOLOGIST: CPT | Performed by: STUDENT IN AN ORGANIZED HEALTH CARE EDUCATION/TRAINING PROGRAM

## 2024-09-20 ENCOUNTER — LAB REQUISITION (OUTPATIENT)
Dept: LAB | Facility: HOSPITAL | Age: 69
End: 2024-09-20
Payer: COMMERCIAL

## 2024-09-20 DIAGNOSIS — D48.5 NEOPLASM OF UNCERTAIN BEHAVIOR OF SKIN: ICD-10-CM

## 2024-09-26 PROCEDURE — 88305 TISSUE EXAM BY PATHOLOGIST: CPT | Performed by: STUDENT IN AN ORGANIZED HEALTH CARE EDUCATION/TRAINING PROGRAM

## 2024-09-26 PROCEDURE — 88341 IMHCHEM/IMCYTCHM EA ADD ANTB: CPT | Performed by: STUDENT IN AN ORGANIZED HEALTH CARE EDUCATION/TRAINING PROGRAM

## 2024-09-26 PROCEDURE — 88342 IMHCHEM/IMCYTCHM 1ST ANTB: CPT | Performed by: STUDENT IN AN ORGANIZED HEALTH CARE EDUCATION/TRAINING PROGRAM

## 2024-09-30 ENCOUNTER — OFFICE VISIT (OUTPATIENT)
Dept: FAMILY MEDICINE CLINIC | Facility: CLINIC | Age: 69
End: 2024-09-30
Payer: COMMERCIAL

## 2024-09-30 VITALS
HEART RATE: 66 BPM | DIASTOLIC BLOOD PRESSURE: 92 MMHG | HEIGHT: 59 IN | BODY MASS INDEX: 31.81 KG/M2 | WEIGHT: 157.8 LBS | OXYGEN SATURATION: 98 % | TEMPERATURE: 97.2 F | SYSTOLIC BLOOD PRESSURE: 138 MMHG

## 2024-09-30 DIAGNOSIS — M62.830 LUMBAR PARASPINAL MUSCLE SPASM: Primary | ICD-10-CM

## 2024-09-30 PROCEDURE — 99213 OFFICE O/P EST LOW 20 MIN: CPT | Performed by: FAMILY MEDICINE

## 2024-09-30 RX ORDER — CYCLOBENZAPRINE HCL 10 MG
5-10 TABLET ORAL 3 TIMES DAILY PRN
Qty: 30 TABLET | Refills: 0 | Status: SHIPPED | OUTPATIENT
Start: 2024-09-30

## 2024-09-30 NOTE — PROGRESS NOTES
"Ambulatory Visit  Name: Tonia Avelar      : 1955      MRN: [id number]  Encounter Provider: Halina Knox MD  Encounter Date: 2024   Encounter department: [facility]    Assessment & Plan  Lumbar paraspinal muscle spasm    Orders:    cyclobenzaprine (FLEXERIL) 10 mg tablet; Take 0.5-1 tablets (5-10 mg total) by mouth 3 (three) times a day as needed for muscle spasms       History of Present Illness     L lower back pain x 3 days after bending down. Shooting pain, rated 10/10. No numbness, tingling, or radiation of pain. No difficulty going to the bathroom. Took 3 advil this morning and provided relief. Took 2 Robaxin. with no relief. Tried heating pads and ice with improvement. Walking and leaning forward provide relief. Getting out of bed and prolonged sitting makes it worse.     Back Pain  Pertinent negatives include no abdominal pain, chest pain, fever, headaches, numbness or weakness.         Review of Systems   Constitutional:  Negative for fatigue and fever.   HENT:  Negative for congestion.    Eyes:  Negative for visual disturbance.   Respiratory:  Negative for chest tightness and shortness of breath.    Cardiovascular:  Negative for chest pain and palpitations.   Gastrointestinal:  Negative for abdominal pain.   Genitourinary:  Negative for difficulty urinating.   Musculoskeletal:  Positive for back pain (L lower back pain. Shooing pain but does not radiate.) and myalgias. Negative for arthralgias and gait problem.   Neurological:  Negative for weakness, numbness and headaches.   Hematological:  Does not bruise/bleed easily.           Objective     /92 (BP Location: Left arm, Patient Position: Sitting, Cuff Size: Standard)   Pulse 66   Temp (!) 97.2 °F (36.2 °C) (Temporal)   Ht 4' 11\" (1.499 m)   Wt 71.6 kg (157 lb 12.8 oz)   SpO2 98%   BMI 31.87 kg/m²     Physical Exam  Vitals reviewed.   Constitutional:       Appearance: Normal appearance. She is " well-developed.   Cardiovascular:      Rate and Rhythm: Normal rate.   Pulmonary:      Effort: Pulmonary effort is normal.   Musculoskeletal:         General: Tenderness (across lumbar area L>R) present. Normal range of motion.      Right lower leg: No edema.      Left lower leg: No edema.      Comments: Neg SLR b/l, good strength   Skin:     General: Skin is warm.   Neurological:      General: No focal deficit present.      Mental Status: She is alert and oriented to person, place, and time.   Psychiatric:         Mood and Affect: Mood normal.         Behavior: Behavior normal.         Thought Content: Thought content normal.         Judgment: Judgment normal.

## 2024-10-22 ENCOUNTER — TELEPHONE (OUTPATIENT)
Age: 69
End: 2024-10-22

## 2024-10-22 NOTE — TELEPHONE ENCOUNTER
Caller: Patient     Doctor: Raghu     Reason for call: Patient called and stated she had a total knee replacement back in March of 2024. She recently had a fall and hurt her back but also states her knee is in a lot of pain and it feels like its a burning sensation. Please give patient a call back asking to speak to nurse.     Call back#: [phone]

## 2024-10-22 NOTE — TELEPHONE ENCOUNTER
Spoke to patient.   She reports pain started last week, knee stiff and increased pain. She states she then realized her back was then bothering her and feels she walking different.     We discussed trailing NSAIDS to help with pain and swelling, we discussed never pain (burning, tingling sensation). If pain in the knee is persistent we discussed updating us.

## 2025-03-03 DIAGNOSIS — Z96.652 AFTERCARE FOLLOWING LEFT KNEE JOINT REPLACEMENT SURGERY: Primary | ICD-10-CM

## 2025-03-03 DIAGNOSIS — Z47.1 AFTERCARE FOLLOWING LEFT KNEE JOINT REPLACEMENT SURGERY: Primary | ICD-10-CM

## 2025-03-11 ENCOUNTER — TELEPHONE (OUTPATIENT)
Age: 70
End: 2025-03-11

## 2025-03-11 NOTE — TELEPHONE ENCOUNTER
Caller: Tonia    Doctor: Dr. Mayorga    Reason for call: patient had a TKA done 7 yrs ago with Dr Mayorga. Patient fell and wants make sure she did not do any damage to her knee. She does have an appointment on the 19 for her 1 year TKA of the lt .works till until 430 if she is unable to answer message.  Please advise  Thank you  Call back#: [phone]

## 2025-03-19 ENCOUNTER — OFFICE VISIT (OUTPATIENT)
Dept: OBGYN CLINIC | Facility: HOSPITAL | Age: 70
End: 2025-03-19
Payer: COMMERCIAL

## 2025-03-19 ENCOUNTER — HOSPITAL ENCOUNTER (OUTPATIENT)
Dept: RADIOLOGY | Facility: HOSPITAL | Age: 70
Discharge: HOME | End: 2025-03-19
Attending: ORTHOPAEDIC SURGERY
Payer: COMMERCIAL

## 2025-03-19 VITALS — HEIGHT: 59 IN | WEIGHT: 162 LBS | BODY MASS INDEX: 32.66 KG/M2

## 2025-03-19 DIAGNOSIS — Z47.1 AFTERCARE FOLLOWING LEFT KNEE JOINT REPLACEMENT SURGERY: ICD-10-CM

## 2025-03-19 DIAGNOSIS — Z47.1 AFTERCARE FOLLOWING LEFT KNEE JOINT REPLACEMENT SURGERY: Primary | ICD-10-CM

## 2025-03-19 DIAGNOSIS — Z96.652 AFTERCARE FOLLOWING LEFT KNEE JOINT REPLACEMENT SURGERY: ICD-10-CM

## 2025-03-19 DIAGNOSIS — Z96.652 AFTERCARE FOLLOWING LEFT KNEE JOINT REPLACEMENT SURGERY: Primary | ICD-10-CM

## 2025-03-19 PROCEDURE — 73560 X-RAY EXAM OF KNEE 1 OR 2: CPT

## 2025-03-19 PROCEDURE — 99213 OFFICE O/P EST LOW 20 MIN: CPT | Performed by: ORTHOPAEDIC SURGERY

## 2025-03-19 NOTE — PROGRESS NOTES
Name: Tonia Avelar      : 1955       MRN: [id number]   Encounter Provider: Bruce Mayorga MD   Encounter Date: 25  Encounter department: [facility]     ASSESSMENT & PLAN:  Assessment & Plan  Aftercare following left knee joint replacement surgery  Continue weight bearing activities as tolerated   Continue increasing physical activity; able to be as active as patient want to be  The patient was reminded about prophylactic antibiotic use prior to dental visits for the next year   No further follow up necessary regarding operative leg   The patient is welcome to return at any point with any new or old issue              To do next visit:  The patient can follow up as needed and is welcome to return at any point with any new or old issue.     _____________________________________________________  CHIEF COMPLAINT:  Chief Complaint   Patient presents with   • Left Knee - Post-op         SUBJECTIVE:  Tonia Avelar is a 69 y.o. female who presents 1 year status post left total knee arthroplasty.  She is doing well.  She denies any pain of the left knee.  She denies any concerns or complaints regarding her knee.  She admits to remaining physically active, recently has started working with [employer] sneakers as well.  Overall she admits to improvement in quality of life since surgery.      PAST MEDICAL HISTORY:  Past Medical History:   Diagnosis Date   • Anxiety     last assessed 8/24/15   • Arthralgia of right knee     last assessed 14   • Chronic interstitial cystitis    • GERD (gastroesophageal reflux disease)    • High cholesterol    • History of palpitations    • Hypertension    • Hypokalemia     last assessed 3/7/13   • Irritable bowel syndrome    • Osteoarthritis     last assessed 12   • Osteopenia     last assessed 3/21/13   • PONV (postoperative nausea and vomiting)    • Postmenopausal atrophic vaginitis     last assessed 3/21/13   •  Stress incontinence    • Uterine leiomyoma     resolved        PAST SURGICAL HISTORY:  Past Surgical History:   Procedure Laterality Date   • BREAST CYST ASPIRATION Left     benign   • BREAST SURGERY      puncture aspiration of cyst, left, resolved    • CARDIAC CATHETERIZATION     •  SECTION  1977    daughter   • COLONOSCOPY      1 polyp-hyperplastic by Dr. Nielsen   • COLONOSCOPY      Focal active colitis-acute by Dr. Nielsen   • EGD      Schatzki's ring dilated with 52 Thai Segura, biopsy negative for Beard's by Dr. Way   • EGD      Irregular Z line and biopsy positive for Beard's/intestinal metaplasia by Dr. Way   • EGD      Short segment Beard's biopsy positive Intestinal metaplasia , No dysplasia, fundic gland polyp by Dr. Way   • ESOPHAGOGASTRODUODENOSCOPY N/A 2016    Normal-fundic gland polyp, biopsy of the EG junction normal by Dr. Way   • KNEE ARTHROSCOPY     • IN ARTHRP KNE CONDYLE&PLATU MEDIAL&LAT COMPARTMENTS Right 2018    Procedure: ARTHROPLASTY KNEE TOTAL;  Surgeon: Bruce Mayorga MD;  Location: [facility];  Service: Orthopedics  resolved  per Allscripts   • IN ARTHRP KNE CONDYLE&PLATU MEDIAL&LAT COMPARTMENTS Left 3/25/2024    Procedure: ARTHROPLASTY KNEE TOTAL W ROBOT;  Surgeon: Bruce Mayorga MD;  Location:  MAIN OR;  Service: Orthopedics   • VAGINAL HYSTERECTOMY      fibroids, resolved        FAMILY HISTORY:  Family History   Problem Relation Age of Onset   • Cancer Mother    • Hypertension Mother    • Heart attack Father         MI   • Hypertension Father    • Heart disease Father         coronary artery disease   • No Known Problems Sister    • No Known Problems Sister    • No Known Problems Daughter    • Heart attack Maternal Grandmother    • Stomach cancer Maternal Grandfather    • Uterine cancer Paternal Grandmother    • No Known Problems Paternal Grandfather    • Heart attack Son 36        acute MI,  NSTEMI   • Cancer Maternal Aunt         nonhodgkins lymphoma   • No Known Problems Paternal Aunt    • No Known Problems Paternal Aunt    • No Known Problems Paternal Aunt    • No Known Problems Paternal Aunt    • No Known Problems Paternal Aunt    • Lung cancer Other    • Hypertension Family    • Ulcers Family         peptic       SOCIAL HISTORY:  Social History     Tobacco Use   • Smoking status: Former     Current packs/day: 0.00     Average packs/day: 0.5 packs/day for 25.0 years (12.5 ttl pk-yrs)     Types: Cigarettes     Start date:      Quit date:      Years since quittin.2     Passive exposure: Past   • Smokeless tobacco: Never   • Tobacco comments:     quit 13 years ago   / quit 3-4 yrs ago, smoked 20-25 yrs per allscripts 12    Vaping Use   • Vaping status: Never Used   Substance Use Topics   • Alcohol use: Not Currently     Comment: rare social occasion   • Drug use: No       MEDICATIONS:    Current Outpatient Medications:   •  acetaminophen (TYLENOL) 650 mg CR tablet, Take 1 tablet (650 mg total) by mouth every 8 (eight) hours as needed for mild pain (Patient not taking: Reported on 2024), Disp: 30 tablet, Rfl: 0  •  CALCIUM PO, Take by mouth occasionally, Disp: , Rfl:   •  cyclobenzaprine (FLEXERIL) 10 mg tablet, Take 0.5-1 tablets (5-10 mg total) by mouth 3 (three) times a day as needed for muscle spasms, Disp: 30 tablet, Rfl: 0  •  enoxaparin (LOVENOX) 40 mg/0.4 mL, Inject 0.4 mL (40 mg total) under the skin daily for 28 days Start injections after surgery, Disp: 11.2 mL, Rfl: 0  •  gabapentin (Neurontin) 100 mg capsule, Take 1 capsule (100 mg total) by mouth 3 (three) times a day (Patient not taking: Reported on 2024), Disp: 90 capsule, Rfl: 0  •  losartan (COZAAR) 50 mg tablet, Take 1 tablet (50 mg total) by mouth every evening, Disp: 90 tablet, Rfl: 1  •  meloxicam (Mobic) 7.5 mg tablet, Take 1 tablet (7.5 mg total) by mouth daily (Patient not taking: Reported on  "9/30/2024), Disp: 30 tablet, Rfl: 2  •  metoprolol succinate (TOPROL-XL) 50 mg 24 hr tablet, Take 1 tablet (50 mg total) by mouth daily, Disp: 90 tablet, Rfl: 3  •  ondansetron (ZOFRAN) 4 mg tablet, Take 1 tablet (4 mg total) by mouth every 8 (eight) hours as needed for nausea or vomiting (Patient not taking: Reported on 9/30/2024), Disp: 20 tablet, Rfl: 0  •  pantoprazole (PROTONIX) 40 mg tablet, TAKE ONE TABLET BY MOUTH 2 TIMES A DAY, Disp: 180 tablet, Rfl: 0  •  PARoxetine (PAXIL) 10 mg tablet, TAKE ONE TABLET BY MOUTH 2 TIMES A DAY, Disp: 180 tablet, Rfl: 0  •  rosuvastatin (CRESTOR) 10 MG tablet, Take 1 tablet (10 mg total) by mouth daily (Patient not taking: Reported on 4/17/2024), Disp: 90 tablet, Rfl: 1  •  VITAMIN D PO, Take by mouth, Disp: , Rfl:     ALLERGIES:  Allergies   Allergen Reactions   • Bactrim [Sulfamethoxazole-Trimethoprim] Hives, Itching and Rash   • Sulfa Antibiotics Itching and Rash       LABS:  HgA1c:   Lab Results   Component Value Date    HGBA1C 5.7 (H) 02/29/2024     BMP:   Lab Results   Component Value Date    CALCIUM 8.9 03/26/2024    K 4.1 03/26/2024    CO2 29 03/26/2024     03/26/2024    BUN 16 03/26/2024    CREATININE 0.78 03/26/2024     CBC: No components found for: \"CBC\"    _____________________________________________________  PHYSICAL EXAMINATION:  Vital signs: Ht 4' 11\" (1.499 m)   Wt 73.5 kg (162 lb)   BMI 32.72 kg/m²   General: No acute distress, awake and alert  Psychiatric: Mood and affect appear appropriate  HEENT: Trachea Midline, No torticollis, no apparent facial trauma  Cardiovascular: No audible murmurs; Extremities appear perfused  Pulmonary: No audible wheezing or stridor  Skin: No open lesions; see further details (if any) below    MUSCULOSKELETAL EXAMINATION:  Ortho Exam  Left knee:  Well healed anterior incision  No erythema and no ecchymosis  Stable to varus and valgus stress  Extensor mechanism intact  Extension 0  Flexion 130  Calf compartments soft " and supple  Sensation intact  Toes are warm sensate and mobile     ___________________________________________________  STUDIES REVIEWED:  I personally reviewed the images obtained in office today and my independent interpretation is as follows:    left knee x-ray:   - Well aligned prosthesis without evidence of fractures, acute changes, or hardware failure  - Prosthesis appears properly sized and properly bonded to surrounding bone       PROCEDURES PERFORMED:    None preformed       Sally Meyer PA-C

## 2025-03-20 NOTE — PROGRESS NOTES
60 y o female presents to the office roughly 1 week status post right total knee arthroplasty (op date: 2018)  She is experiencing pain in her right knee today  She has been progressing with physical therapy at home  Review of Systems  Review of systems negative unless otherwise specified in HPI    Past Medical History  Past Medical History:   Diagnosis Date    Anxiety     GERD (gastroesophageal reflux disease)     History of palpitations     Hypertension     Irritable bowel syndrome        Past Surgical History  Past Surgical History:   Procedure Laterality Date    CARDIAC CATHETERIZATION       SECTION      ESOPHAGOGASTRODUODENOSCOPY N/A 2016    Procedure: ESOPHAGOGASTRODUODENOSCOPY (EGD); Surgeon: Idania Collins MD;  Location: [facility]; Service:     KNEE ARTHROSCOPY      MS TOTAL KNEE ARTHROPLASTY Right 2018    Procedure: ARTHROPLASTY KNEE TOTAL;  Surgeon: Niharika Garay MD;  Location:  MAIN OR;  Service: Orthopedics       Current Medications  Current Outpatient Prescriptions on File Prior to Visit   Medication Sig Dispense Refill    acetaminophen (TYLENOL) 325 mg tablet Take 2 tablets (650 mg total) by mouth every 6 (six) hours 90 tablet 0    docusate sodium (COLACE) 100 mg capsule Take 1 capsule (100 mg total) by mouth 2 (two) times a day 20 capsule 0    enoxaparin (LOVENOX) 40 mg/0 4 mL Inject 0 4 mL (40 mg total) under the skin daily for 28 days 11 2 mL 0    losartan (COZAAR) 50 mg tablet Take 50 mg by mouth every evening        metoprolol succinate (TOPROL-XL) 50 mg 24 hr tablet Take 50 mg by mouth every evening Pt hasnt taken it for awhile        oxyCODONE (ROXICODONE) 5 mg immediate release tablet Take 1-2 tablets by mouth every 4-6 hours as needed for pain  60 tablet 0    pantoprazole (PROTONIX) 40 mg tablet Take 40 mg by mouth 2 (two) times a day        PARoxetine (PAXIL) 10 mg tablet Take 10 mg by mouth every evening        pravastatin (PRAVACHOL) 10 mg tablet Take 10 mg by mouth every evening        traMADol (ULTRAM) 50 mg tablet Take 1 tablet (50 mg total) by mouth every 6 (six) hours for 10 days 40 tablet 0    [DISCONTINUED] albuterol (PROVENTIL HFA,VENTOLIN HFA) 90 mcg/act inhaler Inhale 2 puffs every 6 (six) hours as needed for wheezing 1 Inhaler 0    [DISCONTINUED] ALPRAZolam (XANAX) 0 25 mg tablet Take 1 tablet by mouth every 12 (twelve) hours as needed      [DISCONTINUED] ascorbic acid (VITAMIN C) 500 MG tablet Take 1 tablet (500 mg total) by mouth 2 (two) times a day Take with Ferrous sulfate, as part of pre-op Blood management protocol 60 tablet 0    [DISCONTINUED] ferrous sulfate 324 (65 Fe) mg Take 1 tablet (324 mg total) by mouth 2 (two) times a day before meals Take 1 pill BID, PRE-OP with Vit C, may be constipatory 60 tablet 0    [DISCONTINUED] folic acid (FOLVITE) 1 mg tablet Take 1 tablet (1 mg total) by mouth daily for 30 doses Take 1 pill po Qday PRE-OP, with Vit C, and Iron 30 tablet 0    [DISCONTINUED] Ibuprofen (ADVIL PO) Take by mouth as needed       No current facility-administered medications on file prior to visit  Recent Labs [facility])    0  Lab Value Date/Time   HCT 29 6 (L) 03/02/2018 0521   HGB 9 7 (L) 03/02/2018 0521   WBC 11 18 (H) 03/02/2018 0521   INR 0 94 02/12/2018 1105   GLUCOSE 110 03/02/2018 0521   HGBA1C 5 9 02/19/2018 1101   HGBA1C 5 5 08/22/2015 1042         Physical exam  · General: Awake, Alert, Oriented  · Eyes: Pupils equal, round and reactive to light  · Heart: regular rate and rhythm  · Lungs: No audible wheezing  · Abdomen: soft  right Knee exam  · Patient ambulates with the assistance of a walker  · Grossly motor and sensory stable  · Incision is healing well  · Effusion present      Imaging  X-rays of right knee were reviewed    Procedure  Chualar removed    Assessment/Plan:   60 y  o female is roughly 1 week status post right total knee arthroplasty (op date: 02/28/2018)   She will start outpatient physical therapy  We will see her back in 4 weeks  hair removal not indicated

## 2025-05-02 DIAGNOSIS — F32.A DEPRESSION, UNSPECIFIED DEPRESSION TYPE: ICD-10-CM

## 2025-05-02 DIAGNOSIS — I10 ESSENTIAL HYPERTENSION: ICD-10-CM

## 2025-05-02 RX ORDER — LOSARTAN POTASSIUM 50 MG/1
50 TABLET ORAL EVERY EVENING
Qty: 90 TABLET | Refills: 0 | Status: SHIPPED | OUTPATIENT
Start: 2025-05-02

## 2025-05-02 RX ORDER — METOPROLOL SUCCINATE 50 MG/1
TABLET, EXTENDED RELEASE ORAL
Qty: 90 TABLET | Refills: 0 | Status: SHIPPED | OUTPATIENT
Start: 2025-05-02

## 2025-05-02 RX ORDER — PAROXETINE 10 MG/1
10 TABLET, FILM COATED ORAL 2 TIMES DAILY
Qty: 180 TABLET | Refills: 0 | Status: SHIPPED | OUTPATIENT
Start: 2025-05-02

## 2025-06-27 ENCOUNTER — OFFICE VISIT (OUTPATIENT)
Dept: FAMILY MEDICINE CLINIC | Facility: CLINIC | Age: 70
End: 2025-06-27
Payer: COMMERCIAL

## 2025-06-27 VITALS — WEIGHT: 165 LBS | BODY MASS INDEX: 33.26 KG/M2 | RESPIRATION RATE: 16 BRPM | HEIGHT: 59 IN

## 2025-06-27 DIAGNOSIS — Z78.0 POSTMENOPAUSAL: ICD-10-CM

## 2025-06-27 DIAGNOSIS — K21.9 CHRONIC GERD: ICD-10-CM

## 2025-06-27 DIAGNOSIS — Z12.31 ENCOUNTER FOR SCREENING MAMMOGRAM FOR MALIGNANT NEOPLASM OF BREAST: ICD-10-CM

## 2025-06-27 DIAGNOSIS — Z00.00 ANNUAL PHYSICAL EXAM: ICD-10-CM

## 2025-06-27 DIAGNOSIS — E78.5 DYSLIPIDEMIA: ICD-10-CM

## 2025-06-27 DIAGNOSIS — F41.9 ANXIETY DISORDER, UNSPECIFIED TYPE: ICD-10-CM

## 2025-06-27 DIAGNOSIS — R73.03 PREDIABETES: ICD-10-CM

## 2025-06-27 DIAGNOSIS — I10 PRIMARY HYPERTENSION: Primary | ICD-10-CM

## 2025-06-27 DIAGNOSIS — Z23 ENCOUNTER FOR IMMUNIZATION: ICD-10-CM

## 2025-06-27 DIAGNOSIS — M54.50 LEFT LUMBAR PAIN: ICD-10-CM

## 2025-06-27 PROCEDURE — 90471 IMMUNIZATION ADMIN: CPT

## 2025-06-27 PROCEDURE — 99397 PER PM REEVAL EST PAT 65+ YR: CPT | Performed by: PHYSICIAN ASSISTANT

## 2025-06-27 PROCEDURE — 99214 OFFICE O/P EST MOD 30 MIN: CPT | Performed by: PHYSICIAN ASSISTANT

## 2025-06-27 PROCEDURE — 90677 PCV20 VACCINE IM: CPT

## 2025-06-27 RX ORDER — PREDNISONE 10 MG/1
10 TABLET ORAL 2 TIMES DAILY WITH MEALS
Qty: 10 TABLET | Refills: 0 | Status: SHIPPED | OUTPATIENT
Start: 2025-06-27

## 2025-06-27 NOTE — ASSESSMENT & PLAN NOTE
Patient is off statin therapy.  Check lab  Orders:    Comprehensive metabolic panel    Lipid panel

## 2025-06-27 NOTE — PATIENT INSTRUCTIONS
"Patient Education     Routine physical for adults   The Basics   Written by the doctors and editors at [facility]   What is a physical? -- A physical is a routine visit, or \"check-up,\" with your doctor. You might also hear it called a \"wellness visit\" or \"preventive visit.\"  During each visit, the doctor will:   Ask about your physical and mental health   Ask about your habits, behaviors, and lifestyle   Do an exam   Give you vaccines if needed   Talk to you about any medicines you take   Give advice about your health   Answer your questions  Getting regular check-ups is an important part of taking care of your health. It can help your doctor find and treat any problems you have. But it's also important for preventing health problems.  A routine physical is different from a \"sick visit.\" A sick visit is when you see a doctor because of a health concern or problem. Since physicals are scheduled ahead of time, you can think about what you want to ask the doctor.  How often should I get a physical? -- It depends on your age and health. In general, for people age 21 years and older:   If you are younger than 50 years, you might be able to get a physical every 3 years.   If you are 50 years or older, your doctor might recommend a physical every year.  If you have an ongoing health condition, like diabetes or high blood pressure, your doctor will probably want to see you more often.  What happens during a physical? -- In general, each visit will include:   Physical exam - The doctor or nurse will check your height, weight, heart rate, and blood pressure. They will also look at your eyes and ears. They will ask about how you are feeling and whether you have any symptoms that bother you.   Medicines - It's a good idea to bring a list of all the medicines you take to each doctor visit. Your doctor will talk to you about your medicines and answer any questions. Tell them if you are having any side effects that bother you. You " "should also tell them if you are having trouble paying for any of your medicines.   Habits and behaviors - This includes:   Your diet   Your exercise habits   Whether you smoke, drink alcohol, or use drugs   Whether you are sexually active   Whether you feel safe at home  Your doctor will talk to you about things you can do to improve your health and lower your risk of health problems. They will also offer help and support. For example, if you want to quit smoking, they can give you advice and might prescribe medicines. If you want to improve your diet or get more physical activity, they can help you with this, too.   Lab tests, if needed - The tests you get will depend on your age and situation. For example, your doctor might want to check your:   Cholesterol   Blood sugar   Iron level   Vaccines - The recommended vaccines will depend on your age, health, and what vaccines you already had. Vaccines are very important because they can prevent certain serious or deadly infections.   Discussion of screening - \"Screening\" means checking for diseases or other health problems before they cause symptoms. Your doctor can recommend screening based on your age, risk, and preferences. This might include tests to check for:   Cancer, such as breast, prostate, cervical, ovarian, colorectal, prostate, lung, or skin cancer   Sexually transmitted infections, such as chlamydia and gonorrhea   Mental health conditions like depression and anxiety  Your doctor will talk to you about the different types of screening tests. They can help you decide which screenings to have. They can also explain what the results might mean.   Answering questions - The physical is a good time to ask the doctor or nurse questions about your health. If needed, they can refer you to other doctors or specialists, too.  Adults older than 65 years often need other care, too. As you get older, your doctor will talk to you about:   How to prevent falling at " home   Hearing or vision tests   Memory testing   How to take your medicines safely   Making sure that you have the help and support you need at home  All topics are updated as new evidence becomes available and our peer review process is complete.  This topic retrieved from [employer] on: May 02, 2024.  Topic 885909 Version 1.0  Release: 32.4.3 - C32.122  © 2024 UpToDate, Inc. and/or its affiliates. All rights reserved.  Consumer Information Use and Disclaimer   Disclaimer: This generalized information is a limited summary of diagnosis, treatment, and/or medication information. It is not meant to be comprehensive and should be used as a tool to help the user understand and/or assess potential diagnostic and treatment options. It does NOT include all information about conditions, treatments, medications, side effects, or risks that may apply to a specific patient. It is not intended to be medical advice or a substitute for the medical advice, diagnosis, or treatment of a health care provider based on the health care provider's examination and assessment of a patient's specific and unique circumstances. Patients must speak with a health care provider for complete information about their health, medical questions, and treatment options, including any risks or benefits regarding use of medications. This information does not endorse any treatments or medications as safe, effective, or approved for treating a specific patient. UpToDate, Inc. and its affiliates disclaim any warranty or liability relating to this information or the use thereof.The use of this information is governed by the Terms of Use, available at https://www.woltersPeriphaGenuwer.com/en/know/clinical-effectiveness-terms. 2024© UpToDate, Inc. and its affiliates and/or licensors. All rights reserved.  Copyright   © 2024 UpToDate, Inc. and/or its affiliates. All rights reserved.

## 2025-06-27 NOTE — ASSESSMENT & PLAN NOTE
Blood pressure at goal continue losartan 50 mg and metoprolol ER 50 mg  Orders:    Comprehensive metabolic panel    CBC and differential

## 2025-08-22 LAB
ALBUMIN SERPL BCG-MCNC: 4 G/DL (ref 3.5–5)
ALP SERPL-CCNC: 64 U/L (ref 34–104)
ALT SERPL W P-5'-P-CCNC: 11 U/L (ref 7–52)
ANION GAP SERPL CALCULATED.3IONS-SCNC: 10 MMOL/L (ref 4–13)
AST SERPL W P-5'-P-CCNC: 16 U/L (ref 13–39)
BASOPHILS # BLD AUTO: 0.05 THOUSANDS/ÂΜL (ref 0–0.1)
BASOPHILS NFR BLD AUTO: 1 % (ref 0–1)
BILIRUB SERPL-MCNC: 0.79 MG/DL (ref 0.2–1)
BUN SERPL-MCNC: 17 MG/DL (ref 5–25)
CALCIUM SERPL-MCNC: 9.2 MG/DL (ref 8.4–10.2)
CHLORIDE SERPL-SCNC: 104 MMOL/L (ref 96–108)
CHOLEST SERPL-MCNC: 238 MG/DL (ref ?–200)
CO2 SERPL-SCNC: 26 MMOL/L (ref 21–32)
CREAT SERPL-MCNC: 0.73 MG/DL (ref 0.6–1.3)
EOSINOPHIL # BLD AUTO: 0.19 THOUSAND/ÂΜL (ref 0–0.61)
EOSINOPHIL NFR BLD AUTO: 3 % (ref 0–6)
ERYTHROCYTE [DISTWIDTH] IN BLOOD BY AUTOMATED COUNT: 13.7 % (ref 11.6–15.1)
EST. AVERAGE GLUCOSE BLD GHB EST-MCNC: 126 MG/DL
GFR SERPL CREATININE-BSD FRML MDRD: 83 ML/MIN/1.73SQ M
GLUCOSE P FAST SERPL-MCNC: 95 MG/DL (ref 65–99)
HBA1C MFR BLD: 6 %
HCT VFR BLD AUTO: 43.4 % (ref 34.8–46.1)
HDLC SERPL-MCNC: 49 MG/DL
HGB BLD-MCNC: 14 G/DL (ref 11.5–15.4)
IMM GRANULOCYTES # BLD AUTO: 0.02 THOUSAND/UL (ref 0–0.2)
IMM GRANULOCYTES NFR BLD AUTO: 0 % (ref 0–2)
LDLC SERPL CALC-MCNC: 166 MG/DL (ref 0–100)
LYMPHOCYTES # BLD AUTO: 2.49 THOUSANDS/ÂΜL (ref 0.6–4.47)
LYMPHOCYTES NFR BLD AUTO: 36 % (ref 14–44)
MCH RBC QN AUTO: 28.5 PG (ref 26.8–34.3)
MCHC RBC AUTO-ENTMCNC: 32.3 G/DL (ref 31.4–37.4)
MCV RBC AUTO: 88 FL (ref 82–98)
MONOCYTES # BLD AUTO: 0.4 THOUSAND/ÂΜL (ref 0.17–1.22)
MONOCYTES NFR BLD AUTO: 6 % (ref 4–12)
NEUTROPHILS # BLD AUTO: 3.82 THOUSANDS/ÂΜL (ref 1.85–7.62)
NEUTS SEG NFR BLD AUTO: 54 % (ref 43–75)
NONHDLC SERPL-MCNC: 189 MG/DL
NRBC BLD AUTO-RTO: 0 /100 WBCS
PLATELET # BLD AUTO: 260 THOUSANDS/UL (ref 149–390)
PMV BLD AUTO: 10.9 FL (ref 8.9–12.7)
POTASSIUM SERPL-SCNC: 4.2 MMOL/L (ref 3.5–5.3)
PROT SERPL-MCNC: 7 G/DL (ref 6.4–8.4)
RBC # BLD AUTO: 4.91 MILLION/UL (ref 3.81–5.12)
SODIUM SERPL-SCNC: 140 MMOL/L (ref 135–147)
TRIGL SERPL-MCNC: 116 MG/DL (ref ?–150)
WBC # BLD AUTO: 6.97 THOUSAND/UL (ref 4.31–10.16)

## (undated) DEVICE — PLUMEPEN PRO 10FT

## (undated) DEVICE — ABDOMINAL PAD: Brand: DERMACEA

## (undated) DEVICE — STIRRUP STRAP ADULT DISP

## (undated) DEVICE — GLOVE INDICATOR PI UNDERGLOVE SZ 7.5 BLUE

## (undated) DEVICE — CUFF TOURNIQUET 30 X 4 IN QUICK CONNECT DISP 1BLA

## (undated) DEVICE — 3 BONE CEMENT MIXER: Brand: MIXEVAC

## (undated) DEVICE — GLOVE INDICATOR PI UNDERGLOVE SZ 8.5 BLUE

## (undated) DEVICE — HOOD: Brand: FLYTE, SURGICOOL

## (undated) DEVICE — STOCKINETTE REGULAR

## (undated) DEVICE — PAD CAST 6 IN COTTON NON STERILE

## (undated) DEVICE — SILVER-COATED ANTIMICROBIAL BARRIER DRESSING: Brand: ACTICOAT   4" X 8"

## (undated) DEVICE — CAPIT KNEE ATTUNE FB W/DOM AOX

## (undated) DEVICE — 3M™ IOBAN™ 2 ANTIMICROBIAL INCISE DRAPE 6650EZ: Brand: IOBAN™ 2

## (undated) DEVICE — WET SKIN PREP TRAY: Brand: MEDLINE INDUSTRIES, INC.

## (undated) DEVICE — GAUZE SPONGES,16 PLY: Brand: CURITY

## (undated) DEVICE — ACE WRAP 6 IN STERILE

## (undated) DEVICE — SUT VICRYL PLUS 2-0 CTB-1 27 IN VCPB259H

## (undated) DEVICE — THE SIMPULSE SOLO SYSTEM WITH ULTREX RETRACTABLE SPLASH SHIELD TIP: Brand: SIMPULSE SOLO

## (undated) DEVICE — ACE WRAP 6 IN UNSTERILE

## (undated) DEVICE — REM POLYHESIVE ADULT PATIENT RETURN ELECTRODE: Brand: VALLEYLAB

## (undated) DEVICE — CAPIT KNEE ATTUNE FB CEMENT - DEPUY

## (undated) DEVICE — VELYS ROBOT-ASSISTED SOLUTION ARRAY DRILL PIN 125 MM X 4 MM: Brand: VELYS

## (undated) DEVICE — PADDING CAST 4 IN  COTTON STRL

## (undated) DEVICE — SAW BLADE OSCILLATING BRAZOL 167

## (undated) DEVICE — GLOVE SRG BIOGEL 6.5

## (undated) DEVICE — HOOD WITH PEEL AWAY FACE SHIELD: Brand: T7PLUS

## (undated) DEVICE — SUT VICRYL PLUS 1 CTB-1 36 IN VCPB947H

## (undated) DEVICE — BETHLEHEM UNIV TOTAL KNEE, KIT: Brand: CARDINAL HEALTH

## (undated) DEVICE — TRAY FOLEY 16FR URIMETER SURESTEP

## (undated) DEVICE — DUAL CUT SAGITTAL BLADE

## (undated) DEVICE — NO-SCRATCH ™ SMALL WHITNEY CURETTE ™ IS A SINGLE-USE, PLASTIC CURETTE FOR QUICKLY APPLYING, MANIPULATING AND REMOVING BONE CEMENT DURING HIP AND KNEE REPLACEMENT SURGERY. THE PLASTIC IS SOFTER THAN STEEL INSTRUMENTS, REDUCING THE RISK OF DAMAGING THE PROSTHESIS WITH METAL INSTRUMENTS.  THE CURETTE’S 6MM TIP REMOVES EXCESS CEMENT FROM REPLACEMENT HIPS AND KNEES. EASY-TO-MANEUVER, THE SMALL BLUE CURETTE LETS YOU REMOVE CEMENT FROM ALL EDGES OF THE PROSTHESIS.NO-SCRATCH WHITNEY SMALL CURETTE FEATURES:SAFER THAN STEEL- MADE OF PLASTIC - STURDY YET SOFTER THAN SURGICAL STEEL.HANDIER- EACH TOOL HAS A MOLDED-IN THUMB INDENTATION INSTANTLY ORIENTING THE TOOL.- EASIER TO MANEUVER IN HARD TO SEE PLACES.- COLOR-CODED FOR EASY IDENTIFICATION.FASTER- COMES INDIVIDUALLY PACKAGED IN STERILE, PEEL OPEN POUCH, READY TO GO.- APPLIES, MANIPULATES, OR REMOVES CEMENT WITH FINGERTIP PRECISION.ECONOMICAL- THE COST OF A SINGLE REVISION DWARFS THE COST OF A SINGLE-USE CURETTE. - DISPOSABLE – THERE’S NO NEED TO WASTE TIME REMOVING HARDENED CEMENT OR RE-STERILIZING TOOLS.- LESS EXPENSIVE TO BUY AND INVENTORY - ORDER ONLY THE TOOL YOU USE.- PACKAGED 25 INDIVIDUALLY WRAPPED TOOLS TO A CARTON FOR CONVENIENT SHELF STORAGE.: Brand: WHITNEY NO-SCRATCH CURETTE (SMALL)

## (undated) DEVICE — HANDPIECE SET WITH RETRACTABLE COAXIAL FAN SPRAY TIP AND SUCTION TUBE: Brand: INTERPULSE

## (undated) DEVICE — DRAPE EQUIPMENT RF WAND

## (undated) DEVICE — VELYS ROBOTIC-ASSISTED SOLUTION ARRAY SET - KNEE: Brand: VELYS

## (undated) DEVICE — GLOVE INDICATOR PI UNDERGLOVE SZ 7 BLUE

## (undated) DEVICE — JP 3-SPRING RES W/10FR PVC DRAIN/TR: Brand: CARDINAL HEALTH

## (undated) DEVICE — DRAPE SHEET X-LG

## (undated) DEVICE — VELYS SATEL DRAPE

## (undated) DEVICE — INTENDED FOR TISSUE SEPARATION, AND OTHER PROCEDURES THAT REQUIRE A SHARP SURGICAL BLADE TO PUNCTURE OR CUT.: Brand: BARD-PARKER SAFETY BLADES SIZE 10, STERILE

## (undated) DEVICE — COOL TEMP PAD

## (undated) DEVICE — CHLORAPREP HI-LITE 26ML ORANGE

## (undated) DEVICE — GLOVE SRG BIOGEL 8

## (undated) DEVICE — ASTOUND STANDARD SURGICAL GOWN, XL: Brand: CONVERTORS

## (undated) DEVICE — DRAPE SHEET THREE QUARTER

## (undated) DEVICE — PACK TOTAL KNEE PBDS

## (undated) DEVICE — HOOD: Brand: T7PLUS

## (undated) DEVICE — SPINNING CEMENT MIXING BOWL

## (undated) DEVICE — VELYS ROBOT DRAPE

## (undated) DEVICE — VELYS BLADE SAW OSC 85 X 19 X 2MM

## (undated) DEVICE — SAW BLADE RECIPROCATING 179

## (undated) DEVICE — SPONGE PVP SCRUB WING STERILE

## (undated) DEVICE — SPONGE GAUZE 4 X 9

## (undated) DEVICE — GLOVE SRG BIOGEL 7.5

## (undated) DEVICE — RECIPROCATING BLADE, DOUBLE SIDED (70.0 X 0.96 X 12.5MM)